# Patient Record
Sex: FEMALE | Race: WHITE | NOT HISPANIC OR LATINO | Employment: OTHER | URBAN - METROPOLITAN AREA
[De-identification: names, ages, dates, MRNs, and addresses within clinical notes are randomized per-mention and may not be internally consistent; named-entity substitution may affect disease eponyms.]

---

## 2017-05-16 ENCOUNTER — RADIANT APPOINTMENT (OUTPATIENT)
Dept: MAMMOGRAPHY | Facility: CLINIC | Age: 67
End: 2017-05-16
Attending: FAMILY MEDICINE
Payer: COMMERCIAL

## 2017-05-16 DIAGNOSIS — Z12.31 VISIT FOR SCREENING MAMMOGRAM: ICD-10-CM

## 2017-05-16 PROCEDURE — G0202 SCR MAMMO BI INCL CAD: HCPCS | Performed by: RADIOLOGY

## 2017-05-16 PROCEDURE — 77063 BREAST TOMOSYNTHESIS BI: CPT | Performed by: RADIOLOGY

## 2017-05-23 NOTE — PROGRESS NOTES
[facility] [place]  [address] [address], [address]  [place] [address]  [phone]  Dept: [phone]    PRE-OP EVALUATION:  Today's date: 2017    Patricia Haynes (: 1950) presents for pre-operative evaluation assessment as requested by Dr. Barlow.  She requires evaluation and anesthesia risk assessment prior to undergoing surgery/procedure for treatment of Cataracts .  Proposed procedure: Cataracts    Date of Surgery/ Procedure: 2017 and 2017  Time of Surgery/ Procedure: Unknown  Hospital/Surgical Facility: [facility]  Fax number for surgical facility:   Primary Physician: Kirsten Molina  Type of Anesthesia Anticipated: General    Patient has a Health Care Directive or Living Will:  YES Just had it done when she redid her Ludwig.     1. NO - Do you have a history of heart attack, stroke, stent, bypass or surgery on an artery in the head, neck, heart or legs?  2. NO - Do you ever have any pain or discomfort in your chest?  3. NO - Do you have a history of  Heart Failure?  4. NO - Are you troubled by shortness of breath when: walking on the level, up a slight hill or at night?  5. NO - Do you currently have a cold, bronchitis or other respiratory infection?  6. NO - Do you have a cough, shortness of breath or wheezing?  7. NO - Do you sometimes get pains in the calves of your legs when you walk?  8. NO - Do you or anyone in your family have previous history of blood clots?  9. NO - Do you or does anyone in your family have a serious bleeding problem such as prolonged bleeding following surgeries or cuts?  10. YES - HAVE YOU EVER HAD PROBLEMS WITH ANEMIA OR BEEN TOLD TO TAKE IRON PILLS? After Delivery 32 years ago  11. NO - Have you had any abnormal blood loss such as black, tarry or bloody stools, or abnormal vaginal bleeding?  12. YES - HAVE YOU EVER HAD A BLOOD TRANSFUSION? Blood Transfusion due to low iron after delivery 32 years ago.   13. NO - Have you or any of  your relatives ever had problems with anesthesia?  14. NO - Do you have sleep apnea, excessive snoring or daytime drowsiness?  15. NO - Do you have any prosthetic heart valves?  16. NO - Do you have prosthetic joints?  17. NO - Is there any chance that you may be pregnant?      HPI:                                                      Brief HPI related to upcoming procedure: patient has been noting issues with seeing signs when driving. Has been noted to have cataracts. She is scheduled for surgery.       See problem list for active medical problems.  Problems all longstanding and stable, except as noted/documented.  See ROS for pertinent symptoms related to these conditions.                                                                                                  .    MEDICAL HISTORY:                                                      Patient Active Problem List    Diagnosis Date Noted     Urinary urgency 09/13/2012     Priority: Medium     Atrophic vaginitis 09/13/2012     Priority: Medium     Urinary frequency 09/13/2012     Priority: Medium     Urge incontinence 09/13/2012     Priority: Medium     Advanced directives, counseling/discussion 08/07/2012     Priority: Medium     Patient states has Advance Directive and will bring in a copy to clinic.   Patient states that she has a health care directive. Recommended that she makes a copy for her medical record.        CKD (chronic kidney disease) stage 2, GFR 60-89 ml/min 08/07/2012     Priority: Medium     Postmenopausal- LMP 2001 06/23/2011     Priority: Medium     Fibroid, uterine      Priority: Medium     not symptomatic       Ganglion cyst      Priority: Medium     left palm       CARDIOVASCULAR SCREENING; LDL GOAL LESS THAN 160 10/31/2010     Priority: Medium      Past Medical History:   Diagnosis Date     Fibroid, uterine early 2000s    not symptomatic     Ganglion cyst     left palm     History of blood transfusion 6/4/85    Excessive bleeding  during childbirth     Postmenopausal 6/23/2011     Past Surgical History:   Procedure Laterality Date     C REPAIR CRUCIATE LIGAMENT,KNEE  1996    Left knee     COLONOSCOPY  03/11/10    moderate internal hemorrhoids- 10 yr f/u     HC CORRECT BUNION,DOUBLE OSTEOTOMY  12/08/1998    Left foot bunion removal      HYSTEROSCOPY W ENDOMETRIAL BX/POLYPECTOMY W/WO D&C  12/21/2004      LAPAROSCOPY, SURGICAL; CHOLECYSTECTOMY  02/14/2005     Current Outpatient Prescriptions   Medication Sig Dispense Refill     solifenacin (VESICARE) 5 MG tablet Take 0.5 tablets (2.5 mg) by mouth daily 45 tablet 2     Omega-3 Fatty Acids (FISH OIL PO) Take  by mouth.       ASPIRIN 81 MG OR TABS ONE DAILY FOR PREVENTION OF STROKE 1 3     OTC products: None, except as noted above    Allergies   Allergen Reactions     No Known Drug Allergies       Latex Allergy: NO    Social History   Substance Use Topics     Smoking status: Never Smoker     Smokeless tobacco: Never Used     Alcohol use Yes      Comment: 4-5 beers a day, never had withdrawals     History   Drug Use No       REVIEW OF SYSTEMS:                                                    C: NEGATIVE for fever, chills, change in weight  I: NEGATIVE for worrisome rashes, moles or lesions  EYES: as above  E/M: NEGATIVE for ear, mouth and throat problems  R: NEGATIVE for significant cough or SOB  B: NEGATIVE for masses, tenderness or discharge  CV: NEGATIVE for chest pain, palpitations or peripheral edema  GI: NEGATIVE for nausea, abdominal pain, heartburn, or change in bowel habits  : NEGATIVE for frequency, dysuria, or hematuria  M: NEGATIVE for significant arthralgias or myalgia  N: NEGATIVE for weakness, dizziness or paresthesias  E: NEGATIVE for temperature intolerance, skin/hair changes  H: NEGATIVE for bleeding problems  P: NEGATIVE for changes in mood or affect    EXAM:                                                    /78  Pulse 68  Temp 98.3  F (36.8  C) (Temporal)  Resp  "16  Ht 5' 2.6\" (1.59 m)  Wt 199 lb (90.3 kg)  LMP 10/31/2004  BMI 35.71 kg/m2    GENERAL APPEARANCE: healthy, alert and no distress     EYES: EOMI, PERRL     HENT: ear canals and TM's normal and nose and mouth without ulcers or lesions     NECK: no adenopathy, no asymmetry, masses, or scars and thyroid normal to palpation     RESP: lungs clear to auscultation - no rales, rhonchi or wheezes     CV: regular rates and rhythm, normal S1 S2, no S3 or S4 and no murmur, click or rub     ABDOMEN:  soft, nontender, no HSM or masses and bowel sounds normal     MS: extremities normal- no gross deformities noted, no evidence of inflammation in joints, FROM in all extremities.     SKIN: no suspicious lesions or rashes     NEURO: Normal strength and tone, sensory exam grossly normal, mentation intact and speech normal     PSYCH: mentation appears normal. and affect normal/bright    DIAGNOSTICS:                                                    No labs or EKG required for low risk surgery (cataract, skin procedure, breast biopsy, etc)    Recent Labs   Lab Test  10/05/16   1104  01/05/16   1101  09/01/15   1014   HGB   --    --   14.2   PLT   --    --   265   NA   --   141  141   POTASSIUM   --   4.1  4.4   CR   --   0.73  0.74   A1C  5.3   --    --         IMPRESSION:                                                    Reason for surgery/procedure: cataracts  Diagnosis/reason for consult: CKD    The proposed surgical procedure is considered LOW risk.    REVISED CARDIAC RISK INDEX  The patient has the following serious cardiovascular risks for perioperative complications such as (MI, PE, VFib and 3  AV Block):  No serious cardiac risks  INTERPRETATION: 0 risks: Class I (very low risk - 0.4% complication rate)    The patient has the following additional risks for perioperative complications:  No identified additional risks      ICD-10-CM    1. Preop general physical exam Z01.818    2. Cataract H26.9    3. CKD (chronic kidney " disease) stage 2, GFR 60-89 ml/min N18.2        RECOMMENDATIONS:                                                          --Patient is to take all scheduled medications on the day of surgery EXCEPT for modifications listed below.    Anticoagulant or Antiplatelet Medication Use  ASPIRIN: she will stop the aspirin 2 days prior to procedure.         APPROVAL GIVEN to proceed with proposed procedure, without further diagnostic evaluation       Signed Electronically by: AARON RUIZ MD, MD    Copy of this evaluation report is provided to requesting physician.    [facility] Preop Guidelines

## 2017-05-30 ENCOUNTER — OFFICE VISIT (OUTPATIENT)
Dept: FAMILY MEDICINE | Facility: CLINIC | Age: 67
End: 2017-05-30
Payer: COMMERCIAL

## 2017-05-30 VITALS
TEMPERATURE: 98.3 F | HEIGHT: 63 IN | SYSTOLIC BLOOD PRESSURE: 110 MMHG | WEIGHT: 199 LBS | BODY MASS INDEX: 35.26 KG/M2 | RESPIRATION RATE: 16 BRPM | DIASTOLIC BLOOD PRESSURE: 78 MMHG | HEART RATE: 68 BPM

## 2017-05-30 DIAGNOSIS — H26.9 CATARACT: ICD-10-CM

## 2017-05-30 DIAGNOSIS — N18.2 CKD (CHRONIC KIDNEY DISEASE) STAGE 2, GFR 60-89 ML/MIN: ICD-10-CM

## 2017-05-30 DIAGNOSIS — Z01.818 PREOP GENERAL PHYSICAL EXAM: Primary | ICD-10-CM

## 2017-05-30 PROCEDURE — 99214 OFFICE O/P EST MOD 30 MIN: CPT | Performed by: FAMILY MEDICINE

## 2017-05-30 ASSESSMENT — PAIN SCALES - GENERAL: PAINLEVEL: NO PAIN (0)

## 2017-05-30 NOTE — NURSING NOTE
"Chief Complaint   Patient presents with     Pre-Op Exam     Panel Management     BMP, Microalbumin, Lipid Panel       Initial /78  Pulse 68  Temp 98.3  F (36.8  C) (Temporal)  Resp 16  Ht 5' 2.6\" (1.59 m)  Wt 199 lb (90.3 kg)  LMP 10/31/2004  BMI 35.71 kg/m2 Estimated body mass index is 35.71 kg/(m^2) as calculated from the following:    Height as of this encounter: 5' 2.6\" (1.59 m).    Weight as of this encounter: 199 lb (90.3 kg).  Medication Reconciliation: complete     Julia Manning CMA  "

## 2017-05-30 NOTE — MR AVS SNAPSHOT
After Visit Summary   5/30/2017    Patricia Haynes    MRN: [id number]           Patient Information     Date Of Birth          1950        Visit Information        Provider Department      5/30/2017 10:00 AM Kirsten Molina MD [facility] [place]        Today's Diagnoses     Preop general physical exam    -  1      Care Instructions      Before Your Surgery      Call your surgeon if there is any change in your health. This includes signs of a cold or flu (such as a sore throat, runny nose, cough, rash or fever).    Do not smoke, drink alcohol or take over the counter medicine (unless your surgeon or primary care doctor tells you to) for the 24 hours before and after surgery.    If you take prescribed drugs: Follow your doctor s orders about which medicines to take and which to stop until after surgery.    Eating and drinking prior to surgery: follow the instructions from your surgeon    Take a shower or bath the night before surgery. Use the soap your surgeon gave you to gently clean your skin. If you do not have soap from your surgeon, use your regular soap. Do not shave or scrub the surgery site.  Wear clean pajamas and have clean sheets on your bed.           Follow-ups after your visit        Who to contact     If you have questions or need follow up information about today's clinic visit or your schedule please contact [facility] [place] directly at [phone].  Normal or non-critical lab and imaging results will be communicated to you by MyChart, letter or phone within 4 business days after the clinic has received the results. If you do not hear from us within 7 days, please contact the clinic through MyChart or phone. If you have a critical or abnormal lab result, we will notify you by phone as soon as possible.  Submit refill requests through [email or web address] or call your pharmacy and they will forward the refill request to us. Please allow 3 business days for your refill to be  "completed.          Additional Information About Your Visit        [employer] Information     [employer] gives you secure access to your electronic health record. If you see a primary care provider, you can also send messages to your care team and make appointments. If you have questions, please call your primary care clinic.  If you do not have a primary care provider, please call [phone] and they will assist you.        Care EveryWhere ID     This is your Care EveryWhere ID. This could be used by other organizations to access your [place] medical records  [id number]        Your Vitals Were     Pulse Temperature Respirations Height Last Period BMI (Body Mass Index)    68 98.3  F (36.8  C) (Temporal) 16 5' 2.6\" (1.59 m) 10/31/2004 35.71 kg/m2       Blood Pressure from Last 3 Encounters:   05/30/17 110/78   10/05/16 122/82   01/05/16 126/80    Weight from Last 3 Encounters:   05/30/17 199 lb (90.3 kg)   10/05/16 195 lb 12.8 oz (88.8 kg)   01/05/16 195 lb 12.8 oz (88.8 kg)              Today, you had the following     No orders found for display         Today's Medication Changes          These changes are accurate as of: 5/30/17 10:18 AM.  If you have any questions, ask your nurse or doctor.               Stop taking these medicines if you haven't already. Please contact your care team if you have questions.     estradiol 0.1 MG/GM cream   Commonly known as:  ESTRACE VAGINAL   Stopped by:  Kirsten Molina MD                    Primary Care Provider Office Phone # Fax #    Kirsten Molina [phone]       [facility] [address] [address] [facility] [address]        Thank you!     Thank you for choosing [facility]  for your care. Our goal is always to provide you with excellent care. Hearing back from our patients is one way we can continue to improve our services. Please take a few minutes to complete the written survey that you may receive in the mail after your visit with us. " Thank you!             Your Updated Medication List - Protect others around you: Learn how to safely use, store and throw away your medicines at www.disposemymeds.org.          This list is accurate as of: 5/30/17 10:18 AM.  Always use your most recent med list.                   Brand Name Dispense Instructions for use    aspirin 81 MG tablet     1    ONE DAILY FOR PREVENTION OF STROKE       FISH OIL PO      Take  by mouth.       solifenacin 5 MG tablet    VESICARE    45 tablet    Take 0.5 tablets (2.5 mg) by mouth daily

## 2017-07-21 ENCOUNTER — MYC REFILL (OUTPATIENT)
Dept: FAMILY MEDICINE | Facility: CLINIC | Age: 67
End: 2017-07-21

## 2017-07-21 DIAGNOSIS — R39.15 URINARY URGENCY: Primary | ICD-10-CM

## 2017-07-21 DIAGNOSIS — N39.41 URGE INCONTINENCE: ICD-10-CM

## 2017-07-21 DIAGNOSIS — R35.0 URINARY FREQUENCY: ICD-10-CM

## 2017-07-21 RX ORDER — SOLIFENACIN SUCCINATE 5 MG/1
2.5 TABLET, FILM COATED ORAL DAILY
Qty: 45 TABLET | Refills: 2 | Status: SHIPPED | OUTPATIENT
Start: 2017-07-21 | End: 2018-01-19 | Stop reason: ALTCHOICE

## 2017-07-21 NOTE — TELEPHONE ENCOUNTER
Message from Pianpianhart:  Original authorizing provider: MD Patricia Tucker would like a refill of the following medications:  solifenacin (VESICARE) 5 MG tablet [Cosme Beaulieu MD]    Preferred pharmacy: [id number] [address]    Comment:  I talked to Dr. Molina about going back to a whole pill a day, instead of 1/2.

## 2018-01-17 ENCOUNTER — TELEPHONE (OUTPATIENT)
Dept: FAMILY MEDICINE | Facility: CLINIC | Age: 68
End: 2018-01-17

## 2018-01-17 NOTE — TELEPHONE ENCOUNTER
"Patient is on Dr. Molina schedule this Friday at 3:20pm  for \"rapid irregular heart beat\". RN please review if needed to be triaged.   "

## 2018-01-17 NOTE — PROGRESS NOTES
SUBJECTIVE:                                                    Patricia Haynes is a 67 year old female who presents to clinic today for the following health issues:    HPI    Irregular heart beats   Onset:ongoing since August 2015.     Description: Patient states she has been having ongoing irregular heart beats. She did have a stress test done in 2015, Patient states no abnormal findings.        Intensity: mild    Progression of Symptoms:  intermittent    Accompanying Signs & Symptoms:      Previous history of similar problem:     None     Precipitating factors:   Worsened by: none  Therapies Tried and outcome: No therapies tried.     At random all of a sudden she feels her heart start to race, lasts 2-4 hours. When it starts she take 2-3 aspirins. Almost always in the evening. She can't identify any food, exercise or alcohol intake that causes it. It wakes her up occassionally. First time it occurred was in 2015, she saw Dr. Sung and had an echo stress test done. The last one occurred last week, and before that it was September 30th. She has no associated pain or light-headedness. She thinks they are starting to occur with increasing frequency. She only initially noted that the rhythm was abnormal when she started monitoring it with her phone's built in heart monitor.   She denies any known heart rhythm problems. She has no heart problems, her mother had high blood pressure and strokes.     Urinary Incontinence  She also needs to refill her Vesicare, the price recently changed from 30$ to 130$. It was working well for her, but the price is too high now. She reports that she had mild side effects of dry eye with this, but it wasn't bad.     Chronic Kidney Disease Follow-up      Current NSAID use?  Yes:   Aspirin 81 MG         Problem list and histories reviewed & adjusted, as indicated.  Additional history: as documented    BP Readings from Last 3 Encounters:   01/19/18 122/80   05/30/17 110/78   10/05/16  "122/82    Wt Readings from Last 3 Encounters:   01/19/18 90.4 kg (199 lb 6.4 oz)   05/30/17 90.3 kg (199 lb)   10/05/16 88.8 kg (195 lb 12.8 oz)           ROS:  C: NEGATIVE for fever, chills, change in weight  EYES: POSITIVE for dry eye  R: NEGATIVE for significant cough or SOB  CV: POSITIVE for irregular heart beat and palpitations  : incontinence  ROS otherwise negative  Denies weight changes, hot or cold flashes    This document serves as a record of the services and decisions personally performed and made by Kirsten Molina MD. It was created on her behalf by Beverly Wolf, a trained medical scribe. The creation of this document is based the provider's statements to the medical scribe.  Beverly Wolf, January 19, 2018 3:31 PM      OBJECTIVE:     /80  Pulse 66  Temp 98.2  F (36.8  C) (Temporal)  Resp 14  Ht 1.588 m (5' 2.5\")  Wt 90.4 kg (199 lb 6.4 oz)  LMP 10/31/2004  SpO2 96%  BMI 35.89 kg/m2  Body mass index is 35.89 kg/(m^2).  GENERAL: healthy, alert and no distress, obese  HENT: ear canals and TM's normal, nose and mouth without ulcers or lesions  NECK: no adenopathy, no asymmetry, masses, or scars and thyroid normal to palpation  RESP: lungs clear to auscultation - no rales, rhonchi or wheezes  CV: regular rates and rhythm, normal S1 S2, no S3 or S4, no murmur, click or rub and no peripheral edema  SKIN: no suspicious lesions or rashes  PSYCH: mentation appears normal, affect normal/bright    Diagnostic Test Results:  Results for orders placed or performed in visit on 01/19/18 (from the past 24 hour(s))   CBC with platelets and differential   Result Value Ref Range    WBC 6.5 4.0 - 11.0 10e9/L    RBC Count 4.63 3.8 - 5.2 10e12/L    Hemoglobin 14.4 11.7 - 15.7 g/dL    Hematocrit 41.8 35.0 - 47.0 %    MCV 90 78 - 100 fl    MCH 31.1 26.5 - 33.0 pg    MCHC 34.4 31.5 - 36.5 g/dL    RDW 13.2 10.0 - 15.0 %    Platelet Count 300 150 - 450 10e9/L    Diff Method PENDING      EKG - no significant change from "     ASSESSMENT/PLAN:         1. Palpitations  No abnormal rhythms or murmurs heard on auscultation. Repeat EKG showed no changes. Discussed ordering labs to check electrolyte and TSH levels and check for anemia.   Consider ziopatch for further evaluation. Her symptoms are irregular so may difficult to catch.   Patient agrees to plan.   - CBC with platelets and differential  - TSH with free T4 reflex  - Comprehensive metabolic panel  - EKG 12-lead complete w/read - Clinics    2. Urge incontinence  Discussed switching to a new medication for her incontinence that would be cheaper for her, but might increase her dry eye side effects. Recommended switching to ditropan which will be much lower cost for her. Advised her to follow up if it isn't working for her or the side effects are worsened significantly.   - oxybutynin (DITROPAN XL) 5 MG 24 hr tablet; Take 1 tablet (5 mg) by mouth daily  Dispense: 30 tablet; Refill: 1      MEDICATIONS:        - Discontinue Vesicare       - Start taking Oxybutynin       - Continue other medications without change    See Patient Instructions    The information in this document, created by the medical scribe for me, accurately reflects the services I personally performed and the decisions made by me. I have reviewed and approved this document for accuracy.     AARON RUIZ MD, MD  [facility]

## 2018-01-18 ENCOUNTER — MYC MEDICAL ADVICE (OUTPATIENT)
Dept: FAMILY MEDICINE | Facility: CLINIC | Age: 68
End: 2018-01-18

## 2018-01-18 NOTE — TELEPHONE ENCOUNTER
"Patricia Haynes is a 67 year old female who calls with irregular heart rate.    NURSING ASSESSMENT:  Description:  I spoke with pt who states she has been having these \"episodes\" for 3 years but they are happening more often and lasting longer. Used to last 45 min but now can last up to 4 hours Pt states her heart pounds. Usually happens in the late evening or while she is sleeping. Usually heart rate is 62-65 but 90s during this time. Last episode was Monday.  Onset/duration:  3 years  Precip. factors:  None, saw DA in the past for this and states she passed her stress test with \"flying colors\"  Associated symptoms:  No pain, no HA, irredgular  Improves/worsens symptoms:  States she takes 3 aspirin and relaxes  Pain scale (0-10)   0/10  Last exam/Treatment:  Pre-op for cateracts 5/30/2017  Allergies:   Allergies   Allergen Reactions     No Known Drug Allergies        RECOMMENDED DISPOSITION:  See in 24 hours  Will comply with recommendation: Yes  If further questions/concerns or if symptoms do not improve, worsen or new symptoms develop, call your PCP or [facility] as soon as possible.      Guideline used: heart rate problems  Telephone Triage Protocols for Nurses, Fifth Edition, Alayna Durán RN    "

## 2018-01-19 ENCOUNTER — OFFICE VISIT (OUTPATIENT)
Dept: FAMILY MEDICINE | Facility: CLINIC | Age: 68
End: 2018-01-19
Payer: COMMERCIAL

## 2018-01-19 VITALS
SYSTOLIC BLOOD PRESSURE: 122 MMHG | RESPIRATION RATE: 14 BRPM | WEIGHT: 199.4 LBS | BODY MASS INDEX: 35.33 KG/M2 | OXYGEN SATURATION: 96 % | TEMPERATURE: 98.2 F | DIASTOLIC BLOOD PRESSURE: 80 MMHG | HEIGHT: 63 IN | HEART RATE: 66 BPM

## 2018-01-19 DIAGNOSIS — R00.2 PALPITATIONS: Primary | ICD-10-CM

## 2018-01-19 DIAGNOSIS — N39.41 URGE INCONTINENCE: ICD-10-CM

## 2018-01-19 LAB
BASOPHILS # BLD AUTO: 0 10E9/L (ref 0–0.2)
BASOPHILS NFR BLD AUTO: 0.5 %
DIFFERENTIAL METHOD BLD: NORMAL
EOSINOPHIL # BLD AUTO: 0.1 10E9/L (ref 0–0.7)
EOSINOPHIL NFR BLD AUTO: 2.2 %
ERYTHROCYTE [DISTWIDTH] IN BLOOD BY AUTOMATED COUNT: 13.2 % (ref 10–15)
HCT VFR BLD AUTO: 41.8 % (ref 35–47)
HGB BLD-MCNC: 14.4 G/DL (ref 11.7–15.7)
LYMPHOCYTES # BLD AUTO: 2 10E9/L (ref 0.8–5.3)
LYMPHOCYTES NFR BLD AUTO: 30.5 %
MCH RBC QN AUTO: 31.1 PG (ref 26.5–33)
MCHC RBC AUTO-ENTMCNC: 34.4 G/DL (ref 31.5–36.5)
MCV RBC AUTO: 90 FL (ref 78–100)
MONOCYTES # BLD AUTO: 0.6 10E9/L (ref 0–1.3)
MONOCYTES NFR BLD AUTO: 8.8 %
NEUTROPHILS # BLD AUTO: 3.8 10E9/L (ref 1.6–8.3)
NEUTROPHILS NFR BLD AUTO: 58 %
PLATELET # BLD AUTO: 300 10E9/L (ref 150–450)
RBC # BLD AUTO: 4.63 10E12/L (ref 3.8–5.2)
WBC # BLD AUTO: 6.5 10E9/L (ref 4–11)

## 2018-01-19 PROCEDURE — 84439 ASSAY OF FREE THYROXINE: CPT | Performed by: FAMILY MEDICINE

## 2018-01-19 PROCEDURE — 80050 GENERAL HEALTH PANEL: CPT | Performed by: FAMILY MEDICINE

## 2018-01-19 PROCEDURE — 99214 OFFICE O/P EST MOD 30 MIN: CPT | Performed by: FAMILY MEDICINE

## 2018-01-19 PROCEDURE — 93000 ELECTROCARDIOGRAM COMPLETE: CPT | Performed by: FAMILY MEDICINE

## 2018-01-19 PROCEDURE — 36415 COLL VENOUS BLD VENIPUNCTURE: CPT | Performed by: FAMILY MEDICINE

## 2018-01-19 RX ORDER — OXYBUTYNIN CHLORIDE 5 MG/1
5 TABLET, EXTENDED RELEASE ORAL DAILY
Qty: 30 TABLET | Refills: 1 | Status: SHIPPED | OUTPATIENT
Start: 2018-01-19 | End: 2018-03-20

## 2018-01-19 ASSESSMENT — PAIN SCALES - GENERAL: PAINLEVEL: NO PAIN (0)

## 2018-01-19 NOTE — MR AVS SNAPSHOT
"              After Visit Summary   1/19/2018    Patricia Haynes    MRN: [id number]           Patient Information     Date Of Birth          1950        Visit Information        Provider Department      1/19/2018 3:20 PM Kirsten Molina MD [facility] [place]        Today's Diagnoses     Palpitations    -  1    Urge incontinence           Follow-ups after your visit        Who to contact     If you have questions or need follow up information about today's clinic visit or your schedule please contact [facility] [place] directly at [phone].  Normal or non-critical lab and imaging results will be communicated to you by [employer], letter or phone within 4 business days after the clinic has received the results. If you do not hear from us within 7 days, please contact the clinic through [employer] or phone. If you have a critical or abnormal lab result, we will notify you by phone as soon as possible.  Submit refill requests through [facility] or call your pharmacy and they will forward the refill request to us. Please allow 3 business days for your refill to be completed.          Additional Information About Your Visit        MyChart Information     [facility] gives you secure access to your electronic health record. If you see a primary care provider, you can also send messages to your care team and make appointments. If you have questions, please call your primary care clinic.  If you do not have a primary care provider, please call [phone] and they will assist you.        Care EveryWhere ID     This is your Care EveryWhere ID. This could be used by other organizations to access your [facility] medical records  [id number]        Your Vitals Were     Pulse Temperature Respirations Height Last Period Pulse Oximetry    66 98.2  F (36.8  C) (Temporal) 14 5' 2.5\" (1.588 m) 10/31/2004 96%    BMI (Body Mass Index)                   35.89 kg/m2            Blood Pressure from Last 3 Encounters:   01/19/18 " 122/80   05/30/17 110/78   10/05/16 122/82    Weight from Last 3 Encounters:   01/19/18 199 lb 6.4 oz (90.4 kg)   05/30/17 199 lb (90.3 kg)   10/05/16 195 lb 12.8 oz (88.8 kg)              We Performed the Following     CBC with platelets and differential     Comprehensive metabolic panel     EKG 12-lead complete w/read - Clinics     TSH with free T4 reflex          Today's Medication Changes          These changes are accurate as of: 1/19/18 11:59 PM.  If you have any questions, ask your nurse or doctor.               Start taking these medicines.        Dose/Directions    oxybutynin 5 MG 24 hr tablet   Commonly known as:  DITROPAN XL   Used for:  Urge incontinence   Started by:  Kirsten Molina MD        Dose:  5 mg   Take 1 tablet (5 mg) by mouth daily   Quantity:  30 tablet   Refills:  1         Stop taking these medicines if you haven't already. Please contact your care team if you have questions.     solifenacin 5 MG tablet   Commonly known as:  VESICARE   Stopped by:  Kirsten Molina MD                Where to get your medicines      These medications were sent to [id number] - [place], [place] - [address]  [address] [address]    Hours:  test Rx sent successfully 12/26/02  KR Phone:  [phone]     oxybutynin 5 MG 24 hr tablet                Primary Care Provider Office Phone # Fax #    Kirsten Molina [phone] [address] [address]        Equal Access to Services     [facility]: Hadii gabriella ku hadasho Soomaali, waaxda luqadaha, qaybta kaalmada adeegyada, jose idiin hayaan adeeg kharash la'aan . So [facility] [phone].    ATENCIÓN: Si habla español, tiene a alonso disposición servicios gratuitos de asistencia lingüística. Llame al [phone].    We comply with applicable federal civil rights laws and [place] laws. We do not discriminate on the basis of race, color, national origin, age, disability, sex, sexual orientation,  or gender identity.            Thank you!     Thank you for choosing [facility]  for your care. Our goal is always to provide you with excellent care. Hearing back from our patients is one way we can continue to improve our services. Please take a few minutes to complete the written survey that you may receive in the mail after your visit with us. Thank you!             Your Updated Medication List - Protect others around you: Learn how to safely use, store and throw away your medicines at www.disposemymeds.org.          This list is accurate as of: 1/19/18 11:59 PM.  Always use your most recent med list.                   Brand Name Dispense Instructions for use Diagnosis    aspirin 81 MG tablet     1    ONE DAILY FOR PREVENTION OF STROKE    Routine gynecological examination       FISH OIL PO      Take  by mouth.        oxybutynin 5 MG 24 hr tablet    DITROPAN XL    30 tablet    Take 1 tablet (5 mg) by mouth daily    Urge incontinence

## 2018-01-22 LAB
ALBUMIN SERPL-MCNC: 3.9 G/DL (ref 3.4–5)
ALP SERPL-CCNC: 54 U/L (ref 40–150)
ALT SERPL W P-5'-P-CCNC: 37 U/L (ref 0–50)
ANION GAP SERPL CALCULATED.3IONS-SCNC: 7 MMOL/L (ref 3–14)
AST SERPL W P-5'-P-CCNC: 16 U/L (ref 0–45)
BILIRUB SERPL-MCNC: 0.3 MG/DL (ref 0.2–1.3)
BUN SERPL-MCNC: 18 MG/DL (ref 7–30)
CALCIUM SERPL-MCNC: 8.9 MG/DL (ref 8.5–10.1)
CHLORIDE SERPL-SCNC: 103 MMOL/L (ref 94–109)
CO2 SERPL-SCNC: 30 MMOL/L (ref 20–32)
CREAT SERPL-MCNC: 0.84 MG/DL (ref 0.52–1.04)
GFR SERPL CREATININE-BSD FRML MDRD: 68 ML/MIN/1.7M2
GLUCOSE SERPL-MCNC: 123 MG/DL (ref 70–99)
POTASSIUM SERPL-SCNC: 3.7 MMOL/L (ref 3.4–5.3)
PROT SERPL-MCNC: 7.1 G/DL (ref 6.8–8.8)
SODIUM SERPL-SCNC: 140 MMOL/L (ref 133–144)
T4 FREE SERPL-MCNC: 0.93 NG/DL (ref 0.76–1.46)
TSH SERPL DL<=0.005 MIU/L-ACNC: 4.98 MU/L (ref 0.4–4)

## 2018-01-23 ENCOUNTER — MYC MEDICAL ADVICE (OUTPATIENT)
Dept: FAMILY MEDICINE | Facility: CLINIC | Age: 68
End: 2018-01-23

## 2018-01-23 ENCOUNTER — TELEPHONE (OUTPATIENT)
Dept: FAMILY MEDICINE | Facility: CLINIC | Age: 68
End: 2018-01-23

## 2018-01-23 DIAGNOSIS — R00.2 PALPITATIONS: Primary | ICD-10-CM

## 2018-01-23 DIAGNOSIS — E03.4 HYPOTHYROIDISM DUE TO ACQUIRED ATROPHY OF THYROID: ICD-10-CM

## 2018-01-23 RX ORDER — LEVOTHYROXINE SODIUM 25 UG/1
25 TABLET ORAL DAILY
Qty: 90 TABLET | Refills: 1 | Status: SHIPPED | OUTPATIENT
Start: 2018-01-23 | End: 2018-07-23

## 2018-01-23 NOTE — TELEPHONE ENCOUNTER
Orders placed for echo. Patient to schedule. See [employer] message sent.     Prescription sent for levothyroxine. Future lab orders also placed.     Kirsten Molina MD

## 2018-01-24 ENCOUNTER — TELEPHONE (OUTPATIENT)
Dept: FAMILY MEDICINE | Facility: CLINIC | Age: 68
End: 2018-01-24

## 2018-01-24 ENCOUNTER — RADIANT APPOINTMENT (OUTPATIENT)
Dept: CARDIOLOGY | Facility: CLINIC | Age: 68
End: 2018-01-24
Attending: FAMILY MEDICINE
Payer: COMMERCIAL

## 2018-01-24 DIAGNOSIS — R00.2 PALPITATIONS: Primary | ICD-10-CM

## 2018-01-24 DIAGNOSIS — I51.7 MILD CONCENTRIC LEFT VENTRICULAR HYPERTROPHY (LVH): ICD-10-CM

## 2018-01-24 DIAGNOSIS — R00.2 PALPITATIONS: ICD-10-CM

## 2018-01-24 PROCEDURE — 93306 TTE W/DOPPLER COMPLETE: CPT

## 2018-01-30 ENCOUNTER — ALLIED HEALTH/NURSE VISIT (OUTPATIENT)
Dept: CARDIOLOGY | Facility: CLINIC | Age: 68
End: 2018-01-30
Attending: FAMILY MEDICINE
Payer: COMMERCIAL

## 2018-01-30 DIAGNOSIS — R00.2 PALPITATIONS: ICD-10-CM

## 2018-01-30 PROCEDURE — 0298T ZZC EXT ECG > 48HR TO 21 DAY REVIEW AND INTERPRETATN: CPT

## 2018-01-30 PROCEDURE — 0296T ZIO PATCH HOLTER: CPT

## 2018-02-20 ENCOUNTER — TRANSFERRED RECORDS (OUTPATIENT)
Dept: HEALTH INFORMATION MANAGEMENT | Facility: CLINIC | Age: 68
End: 2018-02-20

## 2018-02-23 ENCOUNTER — TELEPHONE (OUTPATIENT)
Dept: FAMILY MEDICINE | Facility: CLINIC | Age: 68
End: 2018-02-23

## 2018-02-23 NOTE — TELEPHONE ENCOUNTER
Called and left patient message regarding results. The results look good.  There are sometimes that the heart rate is elevated but not any concerning rhythms.     Recommend she come in and discuss results if she has questions.  May be able to utilize medication if she is having bothersome symptoms.

## 2018-03-20 ENCOUNTER — MYC REFILL (OUTPATIENT)
Dept: FAMILY MEDICINE | Facility: CLINIC | Age: 68
End: 2018-03-20

## 2018-03-20 DIAGNOSIS — N39.41 URGE INCONTINENCE: ICD-10-CM

## 2018-03-20 RX ORDER — OXYBUTYNIN CHLORIDE 5 MG/1
5 TABLET, EXTENDED RELEASE ORAL DAILY
Qty: 90 TABLET | Refills: 2 | Status: SHIPPED | OUTPATIENT
Start: 2018-03-20 | End: 2018-07-23 | Stop reason: ALTCHOICE

## 2018-03-20 NOTE — TELEPHONE ENCOUNTER
Ditropan:  Prescription approved per [facility] [place] [place] Refill Protocol.  Nancy German, RN, BSN

## 2018-03-20 NOTE — TELEPHONE ENCOUNTER
Message from [employer]:  Original authorizing provider: AARON RUIZ MD, MD Patricia Haynes would like a refill of the following medications:  oxybutynin (DITROPAN XL) 5 MG 24 hr tablet [AARON RUIZ MD, MD]    Preferred pharmacy: [id number] [place], [place] - [address]    Comment:  Please renew for 90 days, if possible. It says no refills.

## 2018-03-30 ENCOUNTER — TELEPHONE (OUTPATIENT)
Dept: FAMILY MEDICINE | Facility: CLINIC | Age: 68
End: 2018-03-30

## 2018-03-30 NOTE — LETTER
[facility]  [address], [address]  [place] [address]  Phone: [phone]  Fax: [phone]  March 30, 2018      Patricia Haynes  [address] [place] [address]      Dear Patricia,    We care about your health and have reviewed your health plan including your medical conditions, medications, and lab results.  Based on this review, it is recommended that you follow up regarding the following health topic(s):  -Wellness (Physical) Visit     We recommend you take the following action(s):  -schedule a WELLNESS (Physical) APPOINTMENT.  We will perform the following labs: A1c, Lipids (fasting cholesterol - nothing to eat except water and/or meds for 8-10 hours), CMP(complete metabolic panel), ALT/AST and TSH (thyroid test).     Please call us at the [facility] - [phone] (or use [employer]) to address the above recommendations.     Thank you for trusting [facility] and we appreciate the opportunity to serve you.  We look forward to supporting your healthcare needs in the future.    Healthy Regards,    Your Health Care Team  [facility]

## 2018-03-30 NOTE — TELEPHONE ENCOUNTER
Panel Management Review      Patient has the following on her problem list: None      Composite cancer screening  Chart review shows that this patient is due/due soon for the following None  Summary:    Patient is due/failing the following:   LDL and PHYSICAL    Action needed:   Patient needs office visit for Physical.    Type of outreach:    Sent letter.    Questions for provider review:    None                                                                                                                                    Angelina Oviedo CMA       Chart routed to Care Team .

## 2018-05-14 NOTE — TELEPHONE ENCOUNTER
Panel Management Review      Patient has the following on her problem list: None      Composite cancer screening  Chart review shows that this patient is due/due soon for the following None  Summary:    Patient is due/failing the following:   LDL and PHYSICAL    Action needed:   Patient needs office visit for physical.    Type of outreach:    Phone, left message for patient to call back.     Questions for provider review:    None                                                                                                                                    Angelina Oviedo CMA       Chart routed to Care Team .

## 2018-06-27 ENCOUNTER — TELEPHONE (OUTPATIENT)
Dept: FAMILY MEDICINE | Facility: CLINIC | Age: 68
End: 2018-06-27

## 2018-06-27 NOTE — TELEPHONE ENCOUNTER
Panel Management Review      Patient has the following on her problem list: None      Composite cancer screening  Chart review shows that this patient is due/due soon for the following None  Summary:    Patient is due/failing the following:   Albumin, lipid, TSH, T 4     Action needed:   Patient needs fasting lab only appointment    Type of outreach:    Phone, left message for patient to call back.  and Sent [employer] message.    Questions for provider review:    None                                                                                                                                    Jeramie Lux MA       Chart routed to Care Team .

## 2018-06-28 DIAGNOSIS — E03.4 HYPOTHYROIDISM DUE TO ACQUIRED ATROPHY OF THYROID: ICD-10-CM

## 2018-06-28 LAB
T4 FREE SERPL-MCNC: 1.03 NG/DL (ref 0.76–1.46)
TSH SERPL DL<=0.005 MIU/L-ACNC: 3.18 MU/L (ref 0.4–4)

## 2018-06-28 PROCEDURE — 84439 ASSAY OF FREE THYROXINE: CPT | Performed by: FAMILY MEDICINE

## 2018-06-28 PROCEDURE — 36415 COLL VENOUS BLD VENIPUNCTURE: CPT | Performed by: FAMILY MEDICINE

## 2018-06-28 PROCEDURE — 84443 ASSAY THYROID STIM HORMONE: CPT | Performed by: FAMILY MEDICINE

## 2018-07-22 ENCOUNTER — MYC MEDICAL ADVICE (OUTPATIENT)
Dept: FAMILY MEDICINE | Facility: CLINIC | Age: 68
End: 2018-07-22

## 2018-07-22 DIAGNOSIS — R39.15 URINARY URGENCY: Primary | ICD-10-CM

## 2018-07-22 DIAGNOSIS — N39.41 URGE INCONTINENCE: ICD-10-CM

## 2018-07-22 DIAGNOSIS — R35.0 URINARY FREQUENCY: ICD-10-CM

## 2018-07-23 DIAGNOSIS — E03.4 HYPOTHYROIDISM DUE TO ACQUIRED ATROPHY OF THYROID: ICD-10-CM

## 2018-07-23 RX ORDER — LEVOTHYROXINE SODIUM 25 UG/1
TABLET ORAL
Qty: 90 TABLET | Refills: 1 | Status: SHIPPED | OUTPATIENT
Start: 2018-07-23 | End: 2018-09-26

## 2018-07-23 RX ORDER — OXYBUTYNIN CHLORIDE 5 MG/1
5 TABLET ORAL DAILY
Qty: 90 TABLET | Refills: 0 | Status: SHIPPED | OUTPATIENT
Start: 2018-07-23 | End: 2018-09-26

## 2018-07-23 NOTE — TELEPHONE ENCOUNTER
Pt stopping in to see if someone else can refill her prescription as Dr. Molina is out. She would like it sent to  [facility] in [place]. She has 4 pills left. Please advise.

## 2018-07-24 ENCOUNTER — MYC REFILL (OUTPATIENT)
Dept: FAMILY MEDICINE | Facility: CLINIC | Age: 68
End: 2018-07-24

## 2018-07-24 DIAGNOSIS — E03.4 HYPOTHYROIDISM DUE TO ACQUIRED ATROPHY OF THYROID: ICD-10-CM

## 2018-07-24 RX ORDER — LEVOTHYROXINE SODIUM 25 UG/1
25 TABLET ORAL DAILY
Qty: 90 TABLET | Refills: 1 | Status: CANCELLED | OUTPATIENT
Start: 2018-07-24

## 2018-07-24 NOTE — TELEPHONE ENCOUNTER
Message from MyChart:  Original authorizing provider: MD Patricia Tucker would like a refill of the following medications:  levothyroxine (SYNTHROID/LEVOTHROID) 25 MCG tablet [Cosme Beaulieu MD]    Preferred pharmacy: [id number] [address]    Comment:  I have 3 left, no refills & can't see Dr. Molina until August.

## 2018-09-20 NOTE — PROGRESS NOTES
SUBJECTIVE:   Patricia Haynes is a 68 year old female who presents to clinic today for the following health issues:  Client would like a refill of her Levothyroxine and Oxybutynin.     HPI  Concern - Bug Bite- 08/27/2018  Onset: 8/27/2018    Description:   Left upper part of gluteal. Client states that it is a big swollen square. When the bite first appeared it got hot and red, but nothing never appeared right on the site. Painful to the the touch.     Intensity: moderate 4-5/10 at worse. Only painful with touch.     Progression of Symptoms:  Redness/hot has improved. Pain is still present and constant pain to the touch.     Previous history of similar problem:   NA    Precipitating factors:   Worsened by: touch    Alleviating factors:  Improved by: Ice, Extra strength cortaid    Therapies Tried and outcome: Listed above- the ice worked the best.     The patient complains of having a bug bite that occurred on August 27 th. She states that she was on a bicycle ride and got bit suddenly. She complains that the site of the bite was really hot and red when she first got bitten. To help with the healing of the bite, the patient states that she has put ice and ointment on it.   Today, the patient states that the bite is painful to touch and the redness has gone away. The patient denies having a fever, aches, pains, nausea or vomiting.     Oxybutynin  The patient states that her medication is working well and she has no complaints  Needs refills.     Problem list and histories reviewed & adjusted, as indicated.  Additional history: as documented  Patient Active Problem List   Diagnosis     CARDIOVASCULAR SCREENING; LDL GOAL LESS THAN 160     Postmenopausal- LMP 2001     Fibroid, uterine     Ganglion cyst     Advanced directives, counseling/discussion     CKD (chronic kidney disease) stage 2, GFR 60-89 ml/min     Urinary urgency     Atrophic vaginitis     Urinary frequency     Urge incontinence     Hypothyroidism due to  acquired atrophy of thyroid     Mild concentric left ventricular hypertrophy (LVH)     Past Surgical History:   Procedure Laterality Date     C REPAIR CRUCIATE LIGAMENT,KNEE  1996    Left knee     COLONOSCOPY  03/11/10    moderate internal hemorrhoids- 10 yr f/u      CORRECT BUNION,DOUBLE OSTEOTOMY  12/08/1998    Left foot bunion removal      HYSTEROSCOPY W ENDOMETRIAL BX/POLYPECTOMY W/WO D&C  12/21/2004      LAPAROSCOPY, SURGICAL; CHOLECYSTECTOMY  02/14/2005       Social History   Substance Use Topics     Smoking status: Never Smoker     Smokeless tobacco: Never Used     Alcohol use Yes      Comment: 3 beers a day      Family History   Problem Relation Age of Onset     Cancer Mother      cervical     Genitourinary Problems Mother      kidney disease stage 3     Arthritis Mother      Circulatory Mother      blood thinner; water pill     Lipids Mother      Respiratory Mother      tuberculosis; COPD     Hypertension Mother      Osteoporosis Mother      Other Cancer Mother      cervical     Unknown/Adopted Father      Cardiovascular Maternal Grandfather      heart attack     HEART DISEASE Maternal Grandfather      heart attack     Unknown/Adopted Paternal Grandmother      Unknown/Adopted Paternal Grandfather      Thyroid Disease Daughter      Diabetes Daughter      type 1     Diabetes Maternal Grandmother      Cerebrovascular Disease Maternal Grandmother      Thyroid Disease Daughter      C.A.D. No family hx of      Alzheimer Disease No family hx of      Blood Disease No family hx of      Eye Disorder No family hx of      GASTROINTESTINAL DISEASE No family hx of      Musculoskeletal Disorder No family hx of      Neurologic Disorder No family hx of      Breast Cancer No family hx of      Cancer - colorectal No family hx of      Prostate Cancer No family hx of      Asthma No family hx of      Ovarian Cancer No family hx of      Depression/Anxiety No family hx of      Anesthesia Reaction No family hx of      Thyroid  Disease No family hx of      Chemical Addiction No family hx of      Known Genetic Syndrome No family hx of          Current Outpatient Prescriptions   Medication Sig Dispense Refill     ASPIRIN 81 MG OR TABS ONE DAILY FOR PREVENTION OF STROKE 1 3     levothyroxine (SYNTHROID/LEVOTHROID) 25 MCG tablet Take 1 tablet (25 mcg) by mouth daily 90 tablet 3     Omega-3 Fatty Acids (FISH OIL PO) Take  by mouth.       oxybutynin (DITROPAN) 5 MG tablet Take 1 tablet (5 mg) by mouth daily 90 tablet 3     Allergies   Allergen Reactions     No Known Drug Allergies      Recent Labs   Lab Test  06/28/18   0928  01/19/18   1600  10/05/16   1104  01/05/16   1101  09/01/15   1014   12/11/14   0928  08/07/12   1000   A1C   --    --   5.3   --    --    --    --    --    LDL   --    --    --   94   --    --   103  111   HDL   --    --    --   69   --    --   74  61   TRIG   --    --    --   155*   --    --   140  100   ALT   --   37   --    --   32   --    --    --    CR   --   0.84   --   0.73  0.74   --   0.75  0.68   GFRESTIMATED   --   68   --   81  80   --   78  88   GFRESTBLACK   --   82   --   >90   GFR Calc    >90   GFR Calc     --   >90   GFR Calc    >90   POTASSIUM   --   3.7   --   4.1  4.4   --   4.2  4.6   TSH  3.18  4.98*   --   3.63  2.46   < >   --    --     < > = values in this interval not displayed.      BP Readings from Last 3 Encounters:   09/26/18 124/74   01/19/18 122/80   05/30/17 110/78    Wt Readings from Last 3 Encounters:   09/26/18 202 lb 4.8 oz (91.8 kg)   01/19/18 199 lb 6.4 oz (90.4 kg)   05/30/17 199 lb (90.3 kg)        Answers for HPI/ROS submitted by the patient on 9/23/2018   PHQ-2 Score: 0    ROS:  CONSTITUTIONAL: NEGATIVE for fever, chills, change in weight  INTEGUMENTARY/SKIN: NEGATIVE for worrisome rashes, moles or lesions and POSITIVE for redness and pain on bug bite   ENT/MOUTH: NEGATIVE for ear, mouth and throat problems  RESP: NEGATIVE for  "significant cough or SOB  CV: NEGATIVE for chest pain, palpitations or peripheral edema    OBJECTIVE:     /74 (BP Location: Left arm, Patient Position: Sitting, Cuff Size: Adult Large)  Pulse 67  Temp 97  F (36.1  C) (Temporal)  Resp 14  Ht 5' 2.5\" (1.588 m)  Wt 202 lb 4.8 oz (91.8 kg)  LMP 10/31/2004  SpO2 96%  BMI 36.41 kg/m2  Body mass index is 36.41 kg/(m^2).  GENERAL: healthy, alert and no distress  NECK: no adenopathy, no asymmetry, masses, or scars and thyroid normal to palpation  RESP: lungs clear to auscultation - no rales, rhonchi or wheezes  CV: regular rate and rhythm, normal S1 S2, no S3 or S4, no murmur, click or rub, no peripheral edema and peripheral pulses strong  MS: no gross musculoskeletal defects noted, no edema  SKIN: no suspicious rashes, small erythematous papule is noted on upper left buttock with some mild tenderness, no surrounding erythema, induration, redness, swelling or tenderness over the greater trochanter that reproduced her pain    Diagnostic Test Results:  No results found for this or any previous visit (from the past 24 hour(s)).  ASSESSMENT/PLAN:   1. Bug bite, initial encounter  Discussed plan with patient.  Small erythematous papule noted on upper left buttock with mild tenderness.  Should continue to improve.   Call for worsening.   Believe that the discomfort patient is noting now likely trochanteric bursitis. See below      2. Trochanteric bursitis of left hip  Discussed plan with patient.  Ice tid.   Discussed trial of prednisone but she declines.   Discussed referral to sports medicine. Consider injection.   All questions invited, asked and answered to the patient's apparent satisfaction.  Patient agrees to plan.      3. Hypothyroidism due to acquired atrophy of thyroid  Doing well. Well controlled. Tolerating medication.  No change in plan.   Refills have been ordered.   - levothyroxine (SYNTHROID/LEVOTHROID) 25 MCG tablet; Take 1 tablet (25 mcg) by mouth " daily  Dispense: 90 tablet; Refill: 3    4. Urge incontinence  Doing well. Well controlled. Tolerating medication.  No change in plan.    - oxybutynin (DITROPAN) 5 MG tablet; Take 1 tablet (5 mg) by mouth daily  Dispense: 90 tablet; Refill: 3    Follow up- Come back in 4 months for a follow up.     The information in this document, created by the medical scribe for me, accurately reflects the services I personally performed and the decisions made by me. I have reviewed and approved this document for accuracy prior to leaving the patient care area.    AARON RUIZ MD, MD  [facility]

## 2018-09-26 ENCOUNTER — OFFICE VISIT (OUTPATIENT)
Dept: FAMILY MEDICINE | Facility: CLINIC | Age: 68
End: 2018-09-26
Payer: COMMERCIAL

## 2018-09-26 VITALS
WEIGHT: 202.3 LBS | HEART RATE: 67 BPM | DIASTOLIC BLOOD PRESSURE: 74 MMHG | BODY MASS INDEX: 35.84 KG/M2 | SYSTOLIC BLOOD PRESSURE: 124 MMHG | TEMPERATURE: 97 F | RESPIRATION RATE: 14 BRPM | HEIGHT: 63 IN | OXYGEN SATURATION: 96 %

## 2018-09-26 DIAGNOSIS — N39.41 URGE INCONTINENCE: ICD-10-CM

## 2018-09-26 DIAGNOSIS — Z23 NEED FOR PROPHYLACTIC VACCINATION AND INOCULATION AGAINST INFLUENZA: ICD-10-CM

## 2018-09-26 DIAGNOSIS — E03.4 HYPOTHYROIDISM DUE TO ACQUIRED ATROPHY OF THYROID: ICD-10-CM
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
## 2018-09-26 DIAGNOSIS — M70.62 TROCHANTERIC BURSITIS OF LEFT HIP: ICD-10-CM

## 2018-09-26 PROCEDURE — 99214 OFFICE O/P EST MOD 30 MIN: CPT | Mod: 25 | Performed by: FAMILY MEDICINE

## 2018-09-26 PROCEDURE — 90662 IIV NO PRSV INCREASED AG IM: CPT | Performed by: FAMILY MEDICINE

## 2018-09-26 PROCEDURE — G0008 ADMIN INFLUENZA VIRUS VAC: HCPCS | Performed by: FAMILY MEDICINE

## 2018-09-26 RX ORDER — LEVOTHYROXINE SODIUM 25 UG/1
25 TABLET ORAL DAILY
Qty: 90 TABLET | Refills: 3 | Status: SHIPPED | OUTPATIENT
Start: 2018-09-26 | End: 2019-10-05

## 2018-09-26 RX ORDER — OXYBUTYNIN CHLORIDE 5 MG/1
5 TABLET ORAL DAILY
Qty: 90 TABLET | Refills: 3 | Status: SHIPPED | OUTPATIENT
Start: 2018-09-26 | End: 2019-10-05

## 2018-09-26 ASSESSMENT — PAIN SCALES - GENERAL: PAINLEVEL: NO PAIN (0)

## 2018-09-26 NOTE — PROGRESS NOTES
Injectable Influenza Immunization Documentation    1.  Is the person to be vaccinated sick today?   No    2. Does the person to be vaccinated have an allergy to a component   of the vaccine?   No  Egg Allergy Algorithm Link    3. Has the person to be vaccinated ever had a serious reaction   to influenza vaccine in the past?   No    4. Has the person to be vaccinated ever had Guillain-Barré syndrome?   No  Due to injection administration, patient instructed to remain in clinic for 15 minutes  afterwards, and to report any adverse reaction to me immediately.      Form completed by Patient/Jordan Thao MA

## 2018-09-26 NOTE — PATIENT INSTRUCTIONS
Your refills have been ordered.    You can apply ice three times a day to infected area to help the healing. If symptoms worsen, contact me.

## 2018-09-26 NOTE — MR AVS SNAPSHOT
After Visit Summary   9/26/2018    Patricia Haynes    MRN: [id number]           Patient Information     Date Of Birth          1950        Visit Information        Provider Department      9/26/2018 2:40 PM Kirsten Molina MD [facility]        Today's Diagnoses     Bug bite, initial encounter    -  1    Trochanteric bursitis of left hip        Hypothyroidism due to acquired atrophy of thyroid        Urge incontinence          Care Instructions    Your refills have been ordered.    You can apply ice three times a day to infected area to help the healing. If symptoms worsen, contact me.           Follow-ups after your visit        Follow-up notes from your care team     Return in about 4 months (around 1/26/2019) for Physical Exam.      Who to contact     If you have questions or need follow up information about today's clinic visit or your schedule please contact [facility] directly at [phone].  Normal or non-critical lab and imaging results will be communicated to you by MyChart, letter or phone within 4 business days after the clinic has received the results. If you do not hear from us within 7 days, please contact the clinic through [employer] or phone. If you have a critical or abnormal lab result, we will notify you by phone as soon as possible.  Submit refill requests through [employer] or call your pharmacy and they will forward the refill request to us. Please allow 3 business days for your refill to be completed.          Additional Information About Your Visit        MyChart Information     [employer] gives you secure access to your electronic health record. If you see a primary care provider, you can also send messages to your care team and make appointments. If you have questions, please call your primary care clinic.  If you do not have a primary care provider, please call [phone] and they will assist you.        Care EveryWhere ID     This is your Care  "EveryWhere ID. This could be used by other organizations to access your [facility] medical records  [id number]        Your Vitals Were     Pulse Temperature Respirations Height Last Period Pulse Oximetry    67 97  F (36.1  C) (Temporal) 14 5' 2.5\" (1.588 m) 10/31/2004 96%    BMI (Body Mass Index)                   36.41 kg/m2            Blood Pressure from Last 3 Encounters:   09/26/18 124/74   01/19/18 122/80   05/30/17 110/78    Weight from Last 3 Encounters:   09/26/18 202 lb 4.8 oz (91.8 kg)   01/19/18 199 lb 6.4 oz (90.4 kg)   05/30/17 199 lb (90.3 kg)              Today, you had the following     No orders found for display         Today's Medication Changes          These changes are accurate as of 9/26/18  3:13 PM.  If you have any questions, ask your nurse or doctor.               These medicines have changed or have updated prescriptions.        Dose/Directions    levothyroxine 25 MCG tablet   Commonly known as:  SYNTHROID/LEVOTHROID   This may have changed:  See the new instructions.   Used for:  Hypothyroidism due to acquired atrophy of thyroid   Changed by:  Kirsten Molina MD        Dose:  25 mcg   Take 1 tablet (25 mcg) by mouth daily   Quantity:  90 tablet   Refills:  3            Where to get your medicines      These medications were sent to [id number] - [place], [place] - [address]  [address], [facility] [address]    Hours:  test Rx sent successfully 12/26/02  KR Phone:  [phone]     levothyroxine 25 MCG tablet    oxybutynin 5 MG tablet                Primary Care Provider Office Phone # Fax #    Kirsten Molina [phone] [address] [address]        Equal Access to Services     HAL JOHNSON AH: Marli wright Soeyad, waaxda luqadaha, qaybta kaalmada eugene, jose ruvalcaba. So [facility] [phone].    ATENCIÓN: Si habla español, tiene a alonso disposición servicios gratuitos de asistencia " lingüística. Del al [phone].    We comply with applicable federal civil rights laws and [place] laws. We do not discriminate on the basis of race, color, national origin, age, disability, sex, sexual orientation, or gender identity.            Thank you!     Thank you for choosing [facility]  for your care. Our goal is always to provide you with excellent care. Hearing back from our patients is one way we can continue to improve our services. Please take a few minutes to complete the written survey that you may receive in the mail after your visit with us. Thank you!             Your Updated Medication List - Protect others around you: Learn how to safely use, store and throw away your medicines at www.disposemymeds.org.          This list is accurate as of 9/26/18  3:13 PM.  Always use your most recent med list.                   Brand Name Dispense Instructions for use Diagnosis    aspirin 81 MG tablet     1    ONE DAILY FOR PREVENTION OF STROKE    Routine gynecological examination       FISH OIL PO      Take  by mouth.        levothyroxine 25 MCG tablet    SYNTHROID/LEVOTHROID    90 tablet    Take 1 tablet (25 mcg) by mouth daily    Hypothyroidism due to acquired atrophy of thyroid       oxybutynin 5 MG tablet    DITROPAN    90 tablet    Take 1 tablet (5 mg) by mouth daily    Urge incontinence

## 2019-03-15 NOTE — PROGRESS NOTES
"SUBJECTIVE:   Patricia Haynes is a 68 year old female who presents for Preventive Visit.    Are you in the first 12 months of your Medicare coverage?  No    Annual Wellness Visit     In general, how would you rate your overall health?  Excellent    Frequency of exercise:  4-5 days/week    Duration of exercise:  Greater than 60 minutes    Do you usually eat at least 4 servings of fruit and vegetables a day, include whole grains    & fiber and avoid regularly eating high fat or \"junk\" foods?  Yes    Taking medications regularly:  Yes    Medication side effects:  Other    Ability to successfully perform activities of daily living:  No assistance needed    Home Safety:  No safety concerns identified    Hearing Impairment:  No hearing concerns    In the past 6 months, have you been bothered by leaking of urine?  No    In general, how would you rate your overall mental or emotional health?  Excellent    PHQ-2 Total Score: 0    Additional concerns today:  No    Do you feel safe in your environment? Yes    Do you have a Health Care Directive? Yes: Patient states has Advance Directive and will bring in a copy to clinic.      Fall risk  Fallen 2 or more times in the past year?: No  Any fall with injury in the past year?: No    Cognitive Screening   1) Repeat 3 items (Leader, Season, Table)    2) Clock draw: NORMAL  3) 3 item recall: Recalls 3 objects  Results: 3 items recalled: COGNITIVE IMPAIRMENT LESS LIKELY    Mini-CogTM Copyright VIK Patel. Licensed by the author for use in [facility]; reprinted with permission (everette@.[email or web address]). All rights reserved.      Do you have sleep apnea, excessive snoring or daytime drowsiness?: no    Reviewed and updated as needed this visit by clinical staff  Tobacco  Allergies  Meds  Med Hx  Surg Hx  Fam Hx  Soc Hx        Reviewed and updated as needed this visit by Provider        Social History     Tobacco Use     Smoking status: Never Smoker     Smokeless tobacco: Never Used "   Substance Use Topics     Alcohol use: Yes     Comment: 3 beers a day        No flowsheet data found.No flowsheet data found.    Weight   The patient states that she has a hard time losing weight. She states that she cut out alcohol for 6 weeks. She notes that she lost some weight, but gained 2 pounds back.   She states that her  lost 12 pounds from the diet, so she decided to try it.     Shingles   The patient states that she had the old Shingles Vaccine in 2012. She notes that she is interested in having the new Shingles vaccine. She is wondering if she can have it completed here or if she should go to her local Pharmacy.     Current providers sharing in care for this patient include:   Patient Care Team:  Kirsten Molina MD as PCP - General (Family Practice)  Kirsten Molina MD as Assigned PCP    The following health maintenance items are reviewed in Epic and correct as of today:  Health Maintenance   Topic Date Due     HEPATITIS C SCREENING  08/27/1969     ZOSTER IMMUNIZATION (2 of 3) 08/14/2012     MEDICARE ANNUAL WELLNESS VISIT  01/05/2017     LIPID MONITORING Q1 YEAR  01/05/2017     MICROALBUMIN Q1 YEAR  01/05/2017     ADVANCE DIRECTIVE PLANNING Q5 YRS  08/07/2017     BMP Q1 YR  01/19/2019     FALL RISK ASSESSMENT  01/19/2019     MAMMO SCREEN Q2 YR (SYSTEM ASSIGNED)  05/16/2019     PHQ-2 Q1 YR  09/26/2019     DTAP/TDAP/TD IMMUNIZATION (3 - Td) 02/01/2020     COLON CANCER SCREEN (SYSTEM ASSIGNED)  03/11/2020     DEXA SCAN SCREENING (SYSTEM ASSIGNED)  Completed     INFLUENZA VACCINE  Completed     IPV IMMUNIZATION  Aged Out     MENINGITIS IMMUNIZATION  Aged Out     BP Readings from Last 3 Encounters:   04/02/19 122/82   09/26/18 124/74   01/19/18 122/80    Wt Readings from Last 3 Encounters:   04/02/19 88.9 kg (196 lb)   09/26/18 91.8 kg (202 lb 4.8 oz)   01/19/18 90.4 kg (199 lb 6.4 oz)                  Patient Active Problem List   Diagnosis     CARDIOVASCULAR SCREENING; LDL GOAL LESS THAN 160      Postmenopausal- LMP 2001     Fibroid, uterine     Ganglion cyst     Advanced directives, counseling/discussion     CKD (chronic kidney disease) stage 2, GFR 60-89 ml/min     Urinary urgency     Atrophic vaginitis     Urinary frequency     Urge incontinence     Hypothyroidism due to acquired atrophy of thyroid     Mild concentric left ventricular hypertrophy (LVH)     Past Surgical History:   Procedure Laterality Date     C REPAIR CRUCIATE LIGAMENT,KNEE  1996    Left knee     COLONOSCOPY  03/11/10    moderate internal hemorrhoids- 10 yr f/u     HC CORRECT BUNION,DOUBLE OSTEOTOMY  12/08/1998    Left foot bunion removal      HYSTEROSCOPY W ENDOMETRIAL BX/POLYPECTOMY W/WO D&C  12/21/2004      LAPAROSCOPY, SURGICAL; CHOLECYSTECTOMY  02/14/2005       Social History     Tobacco Use     Smoking status: Never Smoker     Smokeless tobacco: Never Used   Substance Use Topics     Alcohol use: Yes     Comment: 3 beers a day      Family History   Problem Relation Age of Onset     Cancer Mother         cervical     Genitourinary Problems Mother         kidney disease stage 3     Arthritis Mother      Circulatory Mother         blood thinner; water pill     Lipids Mother      Respiratory Mother         tuberculosis; COPD     Hypertension Mother      Osteoporosis Mother      Other Cancer Mother         cervical     Unknown/Adopted Father      Cardiovascular Maternal Grandfather         heart attack     Heart Disease Maternal Grandfather         heart attack     Unknown/Adopted Paternal Grandmother      Unknown/Adopted Paternal Grandfather      Thyroid Disease Daughter      Diabetes Daughter         type 1     Diabetes Maternal Grandmother      Cerebrovascular Disease Maternal Grandmother      Thyroid Disease Daughter      C.A.D. No family hx of      Alzheimer Disease No family hx of      Blood Disease No family hx of      Eye Disorder No family hx of      Gastrointestinal Disease No family hx of      Musculoskeletal Disorder  No family hx of      Neurologic Disorder No family hx of      Breast Cancer No family hx of      Cancer - colorectal No family hx of      Prostate Cancer No family hx of      Asthma No family hx of      Ovarian Cancer No family hx of      Depression/Anxiety No family hx of      Anesthesia Reaction No family hx of      Thyroid Disease No family hx of      Chemical Addiction No family hx of      Known Genetic Syndrome No family hx of          Current Outpatient Medications   Medication Sig Dispense Refill     ASPIRIN 81 MG OR TABS ONE DAILY FOR PREVENTION OF STROKE 1 3     levothyroxine (SYNTHROID/LEVOTHROID) 25 MCG tablet Take 1 tablet (25 mcg) by mouth daily 90 tablet 3     Omega-3 Fatty Acids (FISH OIL PO) Take  by mouth.       oxybutynin (DITROPAN) 5 MG tablet Take 1 tablet (5 mg) by mouth daily 90 tablet 3     Allergies   Allergen Reactions     No Known Drug Allergies      Recent Labs   Lab Test 06/28/18  0928 01/19/18  1600 10/05/16  1104 01/05/16  1101 09/01/15  1014  12/11/14  0928 08/07/12  1000   A1C  --   --  5.3  --   --   --   --   --    LDL  --   --   --  94  --   --  103 111   HDL  --   --   --  69  --   --  74 61   TRIG  --   --   --  155*  --   --  140 100   ALT  --  37  --   --  32  --   --   --    CR  --  0.84  --  0.73 0.74  --  0.75 0.68   GFRESTIMATED  --  68  --  81 80  --  78 88   GFRESTBLACK  --  82  --  >90   GFR Calc   >90   GFR Calc    --  >90   GFR Calc   >90   POTASSIUM  --  3.7  --  4.1 4.4  --  4.2 4.6   TSH 3.18 4.98*  --  3.63 2.46   < >  --   --     < > = values in this interval not displayed.        Review of Systems  CONSTITUTIONAL: NEGATIVE for fever, chills; POSITIVE for weight change   INTEGUMENTARY/SKIN: NEGATIVE for worrisome rashes, moles or lesions  EYES: NEGATIVE for vision changes or irritation  ENT/MOUTH: NEGATIVE for ear, mouth and throat problems  RESP: NEGATIVE for significant cough or SOB  BREAST: NEGATIVE for masses,  "tenderness or discharge  CV: NEGATIVE for chest pain, palpitations or peripheral edema  GI: NEGATIVE for nausea, abdominal pain, heartburn, or change in bowel habits  : NEGATIVE for frequency, dysuria, or hematuria  MUSCULOSKELETAL: NEGATIVE for significant arthralgias or myalgia  NEURO: NEGATIVE for weakness, dizziness or paresthesias  ENDOCRINE: NEGATIVE for temperature intolerance, skin/hair changes  HEME: NEGATIVE for bleeding problems  PSYCHIATRIC: NEGATIVE for changes in mood or affect    This document serves as a record of the services and decisions personally performed and made by Kirsten Molina MD. It was created on her behalf by Jaclyn Aguirre, a trained medical scribe. The creation of this document is based the provider's statements to the medical scribe.    Jaclyn Aguirre April 2, 2019 10:38 AM  OBJECTIVE:   /82   Pulse 68   Temp 96.6  F (35.9  C) (Temporal)   Resp 16   Ht 1.58 m (5' 2.21\")   Wt 88.9 kg (196 lb)   LMP 10/31/2004   SpO2 97%   BMI 35.61 kg/m   Estimated body mass index is 35.61 kg/m  as calculated from the following:    Height as of this encounter: 1.58 m (5' 2.21\").    Weight as of this encounter: 88.9 kg (196 lb).  Physical Exam  GENERAL APPEARANCE: healthy, alert and no distress  EYES: Eyes grossly normal to inspection, PERRL and conjunctivae and sclerae normal  HENT: ear canals and TM's normal, nose and mouth without ulcers or lesions, oropharynx clear and oral mucous membranes moist  NECK: no adenopathy, no asymmetry, masses, or scars and thyroid normal to palpation  RESP: lungs clear to auscultation - no rales, rhonchi or wheezes  BREAST: normal without masses, tenderness or nipple discharge and no palpable axillary masses or adenopathy  CV: regular rate and rhythm, normal S1 S2, no S3 or S4, no murmur, click or rub, no peripheral edema and peripheral pulses strong  ABDOMEN: soft, nontender, no hepatosplenomegaly, no masses and bowel sounds normal  MS: no " "musculoskeletal defects are noted and gait is age appropriate without ataxia  SKIN: no suspicious lesions or rashes  NEURO: Normal strength and tone, sensory exam grossly normal, mentation intact and speech normal  PSYCH: mentation appears normal and affect normal/bright    Diagnostic Test Results:  No results found for this or any previous visit (from the past 24 hour(s)).    ASSESSMENT / PLAN:   1. Routine history and physical examination of adult  Physical exam was administered today.  See counseling messages below.     2. Hypothyroidism due to acquired atrophy of thyroid  Doing well. Well controlled. Tolerating medication.  No change in plan.     3. CKD (chronic kidney disease) stage 2, GFR 60-89 ml/min  Labs have been ordered.  Awaiting results.   - Albumin Random Urine Quantitative with Creat Ratio  - Comprehensive metabolic panel (BMP + Alb, Alk Phos, ALT, AST, Total. Bili, TP)    4. Need for hepatitis C screening test  Advised patient of CDC recommendation for Hepatitis C screening.  Patient agreed.  Labs have been ordered.  Will notify with results.   - Hepatitis C Screen Reflex to HCV RNA Quant and Genotype    5. Lipid screening  Labs have been ordered.  Awaiting results.  - Lipid panel reflex to direct LDL Fasting    6. Impaired fasting glucose  Labs have been ordered.   Will notify with results.   - Comprehensive metabolic panel (BMP + Alb, Alk Phos, ALT, AST, Total. Bili, TP)  - Hemoglobin A1c    End of Life Planning:  Patient currently has an advanced directive: Yes.  Practitioner is supportive of decision.    COUNSELING:  Special attention given to:       Regular exercise       Healthy diet/nutrition       Vision screening       Fall risk prevention       Hepatitis C screening    BP Readings from Last 1 Encounters:   04/02/19 122/82     Estimated body mass index is 35.61 kg/m  as calculated from the following:    Height as of this encounter: 1.58 m (5' 2.21\").    Weight as of this encounter: 88.9 kg " (196 lb).      Weight management plan: Discussed healthy diet and exercise guidelines     reports that  has never smoked. she has never used smokeless tobacco.      Appropriate preventive services were discussed with this patient, including applicable screening as appropriate for cardiovascular disease, diabetes, osteopenia/osteoporosis, and glaucoma.  As appropriate for age/gender, discussed screening for colorectal cancer, prostate cancer, breast cancer, and cervical cancer. Checklist reviewing preventive services available has been given to the patient.    Reviewed patients plan of care and provided an AVS. The Basic Care Plan (routine screening as documented in Health Maintenance) for Patricia meets the Care Plan requirement. This Care Plan has been established and reviewed with the Patient.    Counseling Resources:  ATP IV Guidelines  Pooled Cohorts Equation Calculator  Breast Cancer Risk Calculator  FRAX Risk Assessment  ICSI Preventive Guidelines  Dietary Guidelines for Americans, 2010  USDA's MyPlate  ASA Prophylaxis  Lung CA Screening    The information in this document, created by the medical scribe for me, accurately reflects the services I personally performed and the decisions made by me. I have reviewed and approved this document for accuracy prior to leaving the patient care area.    AARON RUIZ MD, MD  [facility]

## 2019-03-15 NOTE — PATIENT INSTRUCTIONS
Preventive Health Recommendations    See your health care provider every year to    Review health changes.     Discuss preventive care.      Review your medicines if your doctor has prescribed any.      You no longer need a yearly Pap test unless you've had an abnormal Pap test in the past 10 years. If you have vaginal symptoms, such as bleeding or discharge, be sure to talk with your provider about a Pap test.      Every 1 to 2 years, have a mammogram.  If you are over 69, talk with your health care provider about whether or not you want to continue having screening mammograms.      Every 10 years, have a colonoscopy. Or, have a yearly FIT test (stool test). These exams will check for colon cancer.       Have a cholesterol test every 5 years, or more often if your doctor advises it.       Have a diabetes test (fasting glucose) every three years. If you are at risk for diabetes, you should have this test more often.       At age 65, have a bone density scan (DEXA) to check for osteoporosis (brittle bone disease).    Shots:    Get a flu shot each year.    Get a tetanus shot every 10 years.    Talk to your doctor about your pneumonia vaccines. There are now two you should receive - Pneumovax (PPSV 23) and Prevnar (PCV 13).    Talk to your pharmacist about the shingles vaccine.    Talk to your doctor about the hepatitis B vaccine.    Nutrition:     Eat at least 5 servings of fruits and vegetables each day.      Eat whole-grain bread, whole-wheat pasta and brown rice instead of white grains and rice.      Get adequate Calcium and Vitamin D.     Lifestyle    Exercise at least 150 minutes a week (30 minutes a day, 5 days a week). This will help you control your weight and prevent disease.      Limit alcohol to one drink per day.      No smoking.       Wear sunscreen to prevent skin cancer.       See your dentist twice a year for an exam and cleaning.      See your eye doctor every 1 to 2 years to screen for conditions  such as glaucoma, macular degeneration and cataracts.    Personalized Prevention Plan  You are due for the preventive services outlined below.  Your care team is available to assist you in scheduling these services.  If you have already completed any of these items, please share that information with your care team to update in your medical record.  Health Maintenance Due   Topic Date Due     Hepatitis C Screening  08/27/1968     Zoster (Shingles) Vaccine (2 of 3) 08/14/2012     Annual Wellness Visit  01/05/2017     Cholesterol Lab - yearly  01/05/2017     Microalbumin Lab - yearly  01/05/2017     Discuss Advance Directive Planning  08/07/2017     Pap Smear - every 3 years  01/05/2019     Basic Metabolic Lab - yearly  01/19/2019     FALL RISK ASSESSMENT  01/19/2019

## 2019-03-22 ENCOUNTER — HEALTH MAINTENANCE LETTER (OUTPATIENT)
Age: 69
End: 2019-03-22

## 2019-04-02 ENCOUNTER — OFFICE VISIT (OUTPATIENT)
Dept: FAMILY MEDICINE | Facility: CLINIC | Age: 69
End: 2019-04-02
Payer: MEDICARE

## 2019-04-02 ENCOUNTER — ANCILLARY PROCEDURE (OUTPATIENT)
Dept: MAMMOGRAPHY | Facility: CLINIC | Age: 69
End: 2019-04-02
Attending: FAMILY MEDICINE
Payer: MEDICARE

## 2019-04-02 VITALS
HEART RATE: 68 BPM | OXYGEN SATURATION: 97 % | BODY MASS INDEX: 36.07 KG/M2 | HEIGHT: 62 IN | WEIGHT: 196 LBS | SYSTOLIC BLOOD PRESSURE: 122 MMHG | TEMPERATURE: 96.6 F | DIASTOLIC BLOOD PRESSURE: 82 MMHG | RESPIRATION RATE: 16 BRPM

## 2019-04-02 DIAGNOSIS — Z13.220 LIPID SCREENING: ICD-10-CM

## 2019-04-02 DIAGNOSIS — E03.4 HYPOTHYROIDISM DUE TO ACQUIRED ATROPHY OF THYROID: ICD-10-CM

## 2019-04-02 DIAGNOSIS — N18.2 CKD (CHRONIC KIDNEY DISEASE) STAGE 2, GFR 60-89 ML/MIN: ICD-10-CM

## 2019-04-02 DIAGNOSIS — R73.01 IMPAIRED FASTING GLUCOSE: ICD-10-CM

## 2019-04-02 DIAGNOSIS — Z11.59 NEED FOR HEPATITIS C SCREENING TEST: ICD-10-CM

## 2019-04-02 DIAGNOSIS — Z00.00 ROUTINE HISTORY AND PHYSICAL EXAMINATION OF ADULT: Primary | ICD-10-CM

## 2019-04-02 LAB
ALBUMIN SERPL-MCNC: 3.9 G/DL (ref 3.4–5)
ALP SERPL-CCNC: 54 U/L (ref 40–150)
ALT SERPL W P-5'-P-CCNC: 36 U/L (ref 0–50)
ANION GAP SERPL CALCULATED.3IONS-SCNC: 6 MMOL/L (ref 3–14)
AST SERPL W P-5'-P-CCNC: 18 U/L (ref 0–45)
BILIRUB SERPL-MCNC: 0.5 MG/DL (ref 0.2–1.3)
BUN SERPL-MCNC: 12 MG/DL (ref 7–30)
CALCIUM SERPL-MCNC: 9.5 MG/DL (ref 8.5–10.1)
CHLORIDE SERPL-SCNC: 109 MMOL/L (ref 94–109)
CHOLEST SERPL-MCNC: 216 MG/DL
CO2 SERPL-SCNC: 26 MMOL/L (ref 20–32)
CREAT SERPL-MCNC: 0.55 MG/DL (ref 0.52–1.04)
CREAT UR-MCNC: 50 MG/DL
GFR SERPL CREATININE-BSD FRML MDRD: >90 ML/MIN/{1.73_M2}
GLUCOSE SERPL-MCNC: 108 MG/DL (ref 70–99)
HBA1C MFR BLD: 5.5 % (ref 0–5.6)
HCV AB SERPL QL IA: NONREACTIVE
HDLC SERPL-MCNC: 59 MG/DL
LDLC SERPL CALC-MCNC: 129 MG/DL
MICROALBUMIN UR-MCNC: <5 MG/L
MICROALBUMIN/CREAT UR: NORMAL MG/G CR (ref 0–25)
NONHDLC SERPL-MCNC: 157 MG/DL
POTASSIUM SERPL-SCNC: 4.1 MMOL/L (ref 3.4–5.3)
PROT SERPL-MCNC: 7.1 G/DL (ref 6.8–8.8)
SODIUM SERPL-SCNC: 141 MMOL/L (ref 133–144)
TRIGL SERPL-MCNC: 139 MG/DL

## 2019-04-02 PROCEDURE — G0439 PPPS, SUBSEQ VISIT: HCPCS | Performed by: FAMILY MEDICINE

## 2019-04-02 PROCEDURE — 82043 UR ALBUMIN QUANTITATIVE: CPT | Performed by: FAMILY MEDICINE

## 2019-04-02 PROCEDURE — 80061 LIPID PANEL: CPT | Performed by: FAMILY MEDICINE

## 2019-04-02 PROCEDURE — 77067 SCR MAMMO BI INCL CAD: CPT | Mod: TC

## 2019-04-02 PROCEDURE — 36415 COLL VENOUS BLD VENIPUNCTURE: CPT | Performed by: FAMILY MEDICINE

## 2019-04-02 PROCEDURE — 80053 COMPREHEN METABOLIC PANEL: CPT | Performed by: FAMILY MEDICINE

## 2019-04-02 PROCEDURE — 83036 HEMOGLOBIN GLYCOSYLATED A1C: CPT | Performed by: FAMILY MEDICINE

## 2019-04-02 PROCEDURE — 86803 HEPATITIS C AB TEST: CPT | Performed by: FAMILY MEDICINE

## 2019-04-02 ASSESSMENT — MIFFLIN-ST. JEOR: SCORE: 1380.55

## 2019-04-02 ASSESSMENT — PAIN SCALES - GENERAL: PAINLEVEL: NO PAIN (0)

## 2019-04-02 ASSESSMENT — ACTIVITIES OF DAILY LIVING (ADL): CURRENT_FUNCTION: NO ASSISTANCE NEEDED

## 2019-09-28 ENCOUNTER — HEALTH MAINTENANCE LETTER (OUTPATIENT)
Age: 69
End: 2019-09-28

## 2020-01-18 DIAGNOSIS — E03.4 HYPOTHYROIDISM DUE TO ACQUIRED ATROPHY OF THYROID: ICD-10-CM

## 2020-01-20 RX ORDER — LEVOTHYROXINE SODIUM 25 UG/1
TABLET ORAL
Qty: 90 TABLET | Refills: 0 | Status: SHIPPED | OUTPATIENT
Start: 2020-01-20 | End: 2020-04-30

## 2020-01-20 NOTE — TELEPHONE ENCOUNTER
Due for recheck of thyroid labs. Please assist in scheduling lab visit. Due for office visit in April.

## 2020-01-20 NOTE — TELEPHONE ENCOUNTER
Pending Prescriptions:                       Disp   Refills    levothyroxine (SYNTHROID/LEVOTHROID) 25 MC*90 tab*0        Sig: TAKE ONE TABLET BY MOUTH ONCE DAILY      Routing refill request to provider for review/approval because:  Labs not current:  TSH    Luzmaria Durán, MSN, RN

## 2020-01-22 DIAGNOSIS — E03.4 HYPOTHYROIDISM DUE TO ACQUIRED ATROPHY OF THYROID: ICD-10-CM

## 2020-01-22 LAB — TSH SERPL DL<=0.005 MIU/L-ACNC: 3 MU/L (ref 0.4–4)

## 2020-01-22 PROCEDURE — 36415 COLL VENOUS BLD VENIPUNCTURE: CPT | Performed by: FAMILY MEDICINE

## 2020-01-22 PROCEDURE — 84443 ASSAY THYROID STIM HORMONE: CPT | Performed by: FAMILY MEDICINE

## 2020-01-26 DIAGNOSIS — E03.4 HYPOTHYROIDISM DUE TO ACQUIRED ATROPHY OF THYROID: ICD-10-CM

## 2020-01-28 RX ORDER — LEVOTHYROXINE SODIUM 25 UG/1
TABLET ORAL
Qty: 90 TABLET | Refills: 0 | OUTPATIENT
Start: 2020-01-28

## 2020-01-28 NOTE — TELEPHONE ENCOUNTER
Pending Prescriptions:                       Disp   Refills    levothyroxine (SYNTHROID/LEVOTHROID) 25 M*90 tab*0            Sig: TAKE ONE TABLET BY MOUTH ONCE DAILY    Shaista Durán, MSN, RN

## 2020-02-03 ENCOUNTER — MYC REFILL (OUTPATIENT)
Dept: FAMILY MEDICINE | Facility: CLINIC | Age: 70
End: 2020-02-03

## 2020-02-03 DIAGNOSIS — E03.4 HYPOTHYROIDISM DUE TO ACQUIRED ATROPHY OF THYROID: ICD-10-CM

## 2020-02-05 RX ORDER — LEVOTHYROXINE SODIUM 25 UG/1
25 TABLET ORAL DAILY
Qty: 90 TABLET | Refills: 0 | OUTPATIENT
Start: 2020-02-05

## 2020-02-05 NOTE — TELEPHONE ENCOUNTER
90 day supply was sent on 1/20/2020, should not be out at this time.    Oneil Pereyra, RN, BSN

## 2020-04-04 DIAGNOSIS — N39.41 URGE INCONTINENCE: ICD-10-CM

## 2020-04-06 RX ORDER — OXYBUTYNIN CHLORIDE 5 MG/1
TABLET ORAL
Qty: 90 TABLET | Refills: 0 | Status: SHIPPED | OUTPATIENT
Start: 2020-04-06 | End: 2020-05-04

## 2020-04-06 NOTE — TELEPHONE ENCOUNTER
Pending Prescriptions:                       Disp   Refills    oxybutynin (DITROPAN) 5 MG tablet [Pharma*90 tab*0            Sig: TAKE ONE TABLET BY MOUTH ONCE DAILY    Medication is being filled for 1 time refill only due to:  Patient needs to be seen because it has been more than one year since last visit.     Luzmaria Durán, MSN, RN

## 2020-04-29 DIAGNOSIS — E03.4 HYPOTHYROIDISM DUE TO ACQUIRED ATROPHY OF THYROID: ICD-10-CM

## 2020-04-29 NOTE — TELEPHONE ENCOUNTER
Pending Prescriptions:                       Disp   Refills    levothyroxine (SYNTHROID/LEVOTHROID) 25 M*90 tab*0            Sig: TAKE ONE TABLET BY MOUTH ONCE DAILY    LOV 4/2019, please help schedule a visit    Luzmaria Durán, MSN, RN

## 2020-04-30 RX ORDER — LEVOTHYROXINE SODIUM 25 UG/1
TABLET ORAL
Qty: 90 TABLET | Refills: 0 | Status: SHIPPED | OUTPATIENT
Start: 2020-04-30 | End: 2020-05-04

## 2020-04-30 NOTE — PROGRESS NOTES
"Patricia Haynes is a 69 year old female who is being evaluated via a billable telephone visit.      The patient has been notified of following:     \"This telephone visit will be conducted via a call between you and your physician/provider. We have found that certain health care needs can be provided without the need for a physical exam.  This service lets us provide the care you need with a short phone conversation.  If a prescription is necessary we can send it directly to your pharmacy.  If lab work is needed we can place an order for that and you can then stop by our lab to have the test done at a later time.    Telephone visits are billed at different rates depending on your insurance coverage. During this emergency period, for some insurers they may be billed the same as an in-person visit.  Please reach out to your insurance provider with any questions.    If during the course of the call the physician/provider feels a telephone visit is not appropriate, you will not be charged for this service.\"    Patient has given verbal consent for Telephone visit?  Yes    How would you like to obtain your AVS? MyChart    Subjective     Patricia Haynes is a 69 year old female who presents to clinic today for the following health issues:    HPI       Hypothyroidism Follow-up      Since last visit, patient describes the following symptoms: Weight stable, no hair loss, no skin changes, no constipation, no loose stools    Patient stated she has been experiencing  a \" Racey\"  heart. Noted with [facility].   Had arrhythmia eval and stress test- normal 3-4 years ago. Noting at 5 pm and evenings.   Uses cell vishnu. For heart Rate increases to 92- normal 57-62 at rest.   Denies anxiety.   Feels it is irregular.   SVT- A-tach. Seen on Ziopatch    Lasts only a few minutes, not sustained. Possible alcohol consumption at these times.         Doing Zoom exercise- 1 hour twice/week. Walking other days.   Nutrition- excellent.     Her pulse " "is normally 56. She stated it has been running 90 -92. She believes it is a side effect from her Oxybutynin.      Overactive Bladder:    Has dry mouth on Oxybutynin- dentist gave her Sensodyne and water pic.  Bladder is \"excellent\"      Patient Active Problem List   Diagnosis     CARDIOVASCULAR SCREENING; LDL GOAL LESS THAN 160     Postmenopausal- LMP 2001     Fibroid, uterine     Ganglion cyst     Advanced directives, counseling/discussion     CKD (chronic kidney disease) stage 2, GFR 60-89 ml/min     Urinary urgency     Atrophic vaginitis     Urinary frequency     Urge incontinence     Hypothyroidism due to acquired atrophy of thyroid     Mild concentric left ventricular hypertrophy (LVH)     Past Surgical History:   Procedure Laterality Date     C REPAIR CRUCIATE LIGAMENT,KNEE  1996    Left knee     COLONOSCOPY  03/11/10    moderate internal hemorrhoids- 10 yr f/u     HC CORRECT BUNION,DOUBLE OSTEOTOMY  12/08/1998    Left foot bunion removal      HYSTEROSCOPY W ENDOMETRIAL BX/POLYPECTOMY W/WO D&C  12/21/2004     HC LAPAROSCOPY, SURGICAL; CHOLECYSTECTOMY  02/14/2005       Social History     Tobacco Use     Smoking status: Never Smoker     Smokeless tobacco: Never Used   Substance Use Topics     Alcohol use: Yes     Comment: 3 beers a day      Family History   Problem Relation Age of Onset     Cancer Mother         cervical     Genitourinary Problems Mother         kidney disease stage 3     Arthritis Mother      Circulatory Mother         blood thinner; water pill     Lipids Mother      Respiratory Mother         tuberculosis; COPD     Hypertension Mother      Osteoporosis Mother      Other Cancer Mother         cervical     Unknown/Adopted Father      Cardiovascular Maternal Grandfather         heart attack     Heart Disease Maternal Grandfather         heart attack     Unknown/Adopted Paternal Grandmother      Unknown/Adopted Paternal Grandfather      Thyroid Disease Daughter      Diabetes Daughter         " type 1     Diabetes Maternal Grandmother      Cerebrovascular Disease Maternal Grandmother      Thyroid Disease Daughter      C.A.D. No family hx of      Alzheimer Disease No family hx of      Blood Disease No family hx of      Eye Disorder No family hx of      Gastrointestinal Disease No family hx of      Musculoskeletal Disorder No family hx of      Neurologic Disorder No family hx of      Breast Cancer No family hx of      Cancer - colorectal No family hx of      Prostate Cancer No family hx of      Asthma No family hx of      Ovarian Cancer No family hx of      Depression/Anxiety No family hx of      Anesthesia Reaction No family hx of      Thyroid Disease No family hx of      Chemical Addiction No family hx of      Known Genetic Syndrome No family hx of            Reviewed and updated as needed this visit by Provider  Tobacco  Allergies  Meds  Problems  Med Hx  Surg Hx  Fam Hx         Review of Systems   ROS COMP: Constitutional, HEENT, cardiovascular, pulmonary, gi and gu systems are negative, except as otherwise noted.       Objective   Reported vitals:  LMP 10/31/2004    healthy, alert and no distress  PSYCH: Alert and oriented times 3; coherent speech, normal   rate and volume, able to articulate logical thoughts, able   to abstract reason, no tangential thoughts, no hallucinations   or delusions  Her affect is normal and pleasant  RESP: No cough, no audible wheezing, able to talk in full sentences  Remainder of exam unable to be completed due to telephone visits    Diagnostic Test Results:  Labs reviewed in Epic        Assessment/Plan:  1. Hypothyroidism due to acquired atrophy of thyroid  Stable, meds refilled  - levothyroxine (SYNTHROID/LEVOTHROID) 25 MCG tablet; Take 1 tablet (25 mcg) by mouth daily  Dispense: 90 tablet; Refill: 0    2. Urge incontinence  Stable meds refilled.   - oxybutynin (DITROPAN) 5 MG tablet; Take 1 tablet (5 mg) by mouth daily  Dispense: 90 tablet; Refill:  "1    Palpitations: benign at this time, had previosu work-up. Warning signs and ED eval. If lasting over 5-10 minutes. WIll work-up if lasting over 5 mintues.     Return in about 6 months (around 11/4/2020).    Patient is stable for home plan.  Continue plan.    \"Stay at Home\" cares.   Refills done.     Plan changes: none  Labs/Imaging:     Post-Covid (yes/no): yes   Phone call duration:  8.5 minutes    RAFAEL Ortiz CNP        "

## 2020-05-04 ENCOUNTER — VIRTUAL VISIT (OUTPATIENT)
Dept: FAMILY MEDICINE | Facility: OTHER | Age: 70
End: 2020-05-04
Payer: MEDICARE

## 2020-05-04 DIAGNOSIS — E03.4 HYPOTHYROIDISM DUE TO ACQUIRED ATROPHY OF THYROID: ICD-10-CM

## 2020-05-04 DIAGNOSIS — N39.41 URGE INCONTINENCE: ICD-10-CM

## 2020-05-04 DIAGNOSIS — R00.2 PALPITATIONS: Primary | ICD-10-CM

## 2020-05-04 PROCEDURE — 99441 ZZC PHYSICIAN TELEPHONE EVALUATION 5-10 MIN: CPT | Performed by: NURSE PRACTITIONER

## 2020-05-04 RX ORDER — LEVOTHYROXINE SODIUM 25 UG/1
25 TABLET ORAL DAILY
Qty: 90 TABLET | Refills: 0 | Status: SHIPPED | OUTPATIENT
Start: 2020-05-04 | End: 2020-10-29

## 2020-05-04 RX ORDER — OXYBUTYNIN CHLORIDE 5 MG/1
5 TABLET ORAL DAILY
Qty: 90 TABLET | Refills: 1 | Status: SHIPPED | OUTPATIENT
Start: 2020-05-04 | End: 2021-05-11

## 2020-10-29 DIAGNOSIS — E03.4 HYPOTHYROIDISM DUE TO ACQUIRED ATROPHY OF THYROID: ICD-10-CM

## 2020-10-29 RX ORDER — LEVOTHYROXINE SODIUM 25 UG/1
TABLET ORAL
Qty: 90 TABLET | Refills: 0 | Status: SHIPPED | OUTPATIENT
Start: 2020-10-29 | End: 2021-01-27

## 2020-10-29 NOTE — TELEPHONE ENCOUNTER
Prescription approved per [facility] Refill Protocol.  Jordan Thao RN  October 29, 2020

## 2020-11-01 DIAGNOSIS — E03.4 HYPOTHYROIDISM DUE TO ACQUIRED ATROPHY OF THYROID: ICD-10-CM

## 2020-11-02 ENCOUNTER — NURSE TRIAGE (OUTPATIENT)
Dept: FAMILY MEDICINE | Facility: CLINIC | Age: 70
End: 2020-11-02

## 2020-11-02 RX ORDER — LEVOTHYROXINE SODIUM 25 UG/1
TABLET ORAL
Qty: 90 TABLET | Refills: 0 | OUTPATIENT
Start: 2020-11-02

## 2020-11-02 NOTE — PROGRESS NOTES
Subjective     Patricia Haynes is a 70 year old female who presents to clinic today for the following health issues:    HPI         {SUPERLIST (Optional):471321}  {additonal problems for provider to add (Optional):164917}    Review of Systems   {ROS COMP (Optional):383749}      Objective    LMP 10/31/2004   There is no height or weight on file to calculate BMI.  Physical Exam   {Exam List (Optional):246618}    {Diagnostic Test Results (Optional):255574}        {PROVIDER CHARTING PREFERENCE:361310}

## 2020-11-02 NOTE — TELEPHONE ENCOUNTER
"LM for the patient to return call to the clinic.   Please transfer to any triage RN.     BACKGROUND:   Patient is scheduled with  on 11/3/20 for a physical. One of the topics to discuss is \"irregular heartbeat.\" Please find out more information.  Jordan Thao RN  October 21, 2020    "

## 2020-11-03 ENCOUNTER — OFFICE VISIT (OUTPATIENT)
Dept: FAMILY MEDICINE | Facility: CLINIC | Age: 70
End: 2020-11-03
Payer: MEDICARE

## 2020-11-03 VITALS
TEMPERATURE: 97.8 F | BODY MASS INDEX: 37.93 KG/M2 | HEART RATE: 75 BPM | WEIGHT: 206.1 LBS | OXYGEN SATURATION: 96 % | SYSTOLIC BLOOD PRESSURE: 104 MMHG | RESPIRATION RATE: 18 BRPM | HEIGHT: 62 IN | DIASTOLIC BLOOD PRESSURE: 66 MMHG

## 2020-11-03 DIAGNOSIS — E03.4 HYPOTHYROIDISM DUE TO ACQUIRED ATROPHY OF THYROID: ICD-10-CM

## 2020-11-03 DIAGNOSIS — Z12.11 SCREEN FOR COLON CANCER: ICD-10-CM

## 2020-11-03 DIAGNOSIS — Z13.220 LIPID SCREENING: ICD-10-CM

## 2020-11-03 DIAGNOSIS — Z00.00 ENCOUNTER FOR MEDICARE ANNUAL WELLNESS EXAM: Primary | ICD-10-CM

## 2020-11-03 DIAGNOSIS — Z23 ENCOUNTER FOR IMMUNIZATION: ICD-10-CM

## 2020-11-03 DIAGNOSIS — R00.2 PALPITATIONS: ICD-10-CM

## 2020-11-03 DIAGNOSIS — R73.01 IMPAIRED FASTING GLUCOSE: ICD-10-CM

## 2020-11-03 DIAGNOSIS — N18.2 CKD (CHRONIC KIDNEY DISEASE) STAGE 2, GFR 60-89 ML/MIN: ICD-10-CM

## 2020-11-03 LAB
ALBUMIN SERPL-MCNC: 3.7 G/DL (ref 3.4–5)
ALP SERPL-CCNC: 44 U/L (ref 40–150)
ALT SERPL W P-5'-P-CCNC: 28 U/L (ref 0–50)
ANION GAP SERPL CALCULATED.3IONS-SCNC: 7 MMOL/L (ref 3–14)
AST SERPL W P-5'-P-CCNC: 16 U/L (ref 0–45)
BASOPHILS # BLD AUTO: 0 10E9/L (ref 0–0.2)
BASOPHILS NFR BLD AUTO: 0.4 %
BILIRUB SERPL-MCNC: 0.5 MG/DL (ref 0.2–1.3)
BUN SERPL-MCNC: 17 MG/DL (ref 7–30)
CALCIUM SERPL-MCNC: 8.6 MG/DL (ref 8.5–10.1)
CHLORIDE SERPL-SCNC: 109 MMOL/L (ref 94–109)
CHOLEST SERPL-MCNC: 185 MG/DL
CO2 SERPL-SCNC: 26 MMOL/L (ref 20–32)
CREAT SERPL-MCNC: 0.66 MG/DL (ref 0.52–1.04)
CREAT UR-MCNC: 157 MG/DL
DIFFERENTIAL METHOD BLD: NORMAL
EOSINOPHIL # BLD AUTO: 0.1 10E9/L (ref 0–0.7)
EOSINOPHIL NFR BLD AUTO: 1.8 %
ERYTHROCYTE [DISTWIDTH] IN BLOOD BY AUTOMATED COUNT: 13.6 % (ref 10–15)
GFR SERPL CREATININE-BSD FRML MDRD: 90 ML/MIN/{1.73_M2}
GLUCOSE SERPL-MCNC: 101 MG/DL (ref 70–99)
HCT VFR BLD AUTO: 43.1 % (ref 35–47)
HDLC SERPL-MCNC: 70 MG/DL
HGB BLD-MCNC: 14.4 G/DL (ref 11.7–15.7)
LDLC SERPL CALC-MCNC: 91 MG/DL
LYMPHOCYTES # BLD AUTO: 1.5 10E9/L (ref 0.8–5.3)
LYMPHOCYTES NFR BLD AUTO: 26.1 %
MCH RBC QN AUTO: 30.4 PG (ref 26.5–33)
MCHC RBC AUTO-ENTMCNC: 33.4 G/DL (ref 31.5–36.5)
MCV RBC AUTO: 91 FL (ref 78–100)
MICROALBUMIN UR-MCNC: 11 MG/L
MICROALBUMIN/CREAT UR: 7.26 MG/G CR (ref 0–25)
MONOCYTES # BLD AUTO: 0.6 10E9/L (ref 0–1.3)
MONOCYTES NFR BLD AUTO: 11.3 %
NEUTROPHILS # BLD AUTO: 3.4 10E9/L (ref 1.6–8.3)
NEUTROPHILS NFR BLD AUTO: 60.4 %
NONHDLC SERPL-MCNC: 115 MG/DL
PLATELET # BLD AUTO: 272 10E9/L (ref 150–450)
POTASSIUM SERPL-SCNC: 4.2 MMOL/L (ref 3.4–5.3)
PROT SERPL-MCNC: 7 G/DL (ref 6.8–8.8)
RBC # BLD AUTO: 4.73 10E12/L (ref 3.8–5.2)
SODIUM SERPL-SCNC: 142 MMOL/L (ref 133–144)
TRIGL SERPL-MCNC: 119 MG/DL
TSH SERPL DL<=0.005 MIU/L-ACNC: 2.66 MU/L (ref 0.4–4)
WBC # BLD AUTO: 5.6 10E9/L (ref 4–11)

## 2020-11-03 PROCEDURE — 90714 TD VACC NO PRESV 7 YRS+ IM: CPT | Performed by: FAMILY MEDICINE

## 2020-11-03 PROCEDURE — 84443 ASSAY THYROID STIM HORMONE: CPT | Performed by: FAMILY MEDICINE

## 2020-11-03 PROCEDURE — G0009 ADMIN PNEUMOCOCCAL VACCINE: HCPCS | Performed by: FAMILY MEDICINE

## 2020-11-03 PROCEDURE — G0439 PPPS, SUBSEQ VISIT: HCPCS | Performed by: FAMILY MEDICINE

## 2020-11-03 PROCEDURE — 36415 COLL VENOUS BLD VENIPUNCTURE: CPT | Performed by: FAMILY MEDICINE

## 2020-11-03 PROCEDURE — 85025 COMPLETE CBC W/AUTO DIFF WBC: CPT | Performed by: FAMILY MEDICINE

## 2020-11-03 PROCEDURE — 82043 UR ALBUMIN QUANTITATIVE: CPT | Performed by: FAMILY MEDICINE

## 2020-11-03 PROCEDURE — 80061 LIPID PANEL: CPT | Performed by: FAMILY MEDICINE

## 2020-11-03 PROCEDURE — 99214 OFFICE O/P EST MOD 30 MIN: CPT | Mod: 25 | Performed by: FAMILY MEDICINE

## 2020-11-03 PROCEDURE — 93000 ELECTROCARDIOGRAM COMPLETE: CPT | Performed by: FAMILY MEDICINE

## 2020-11-03 PROCEDURE — 90732 PPSV23 VACC 2 YRS+ SUBQ/IM: CPT | Performed by: FAMILY MEDICINE

## 2020-11-03 PROCEDURE — 80053 COMPREHEN METABOLIC PANEL: CPT | Performed by: FAMILY MEDICINE

## 2020-11-03 PROCEDURE — 90472 IMMUNIZATION ADMIN EACH ADD: CPT | Performed by: FAMILY MEDICINE

## 2020-11-03 ASSESSMENT — ENCOUNTER SYMPTOMS
JOINT SWELLING: 0
HEARTBURN: 0
EYE PAIN: 0
FREQUENCY: 0
BREAST MASS: 0
CHILLS: 0
FEVER: 0
HEADACHES: 0
PARESTHESIAS: 0
DYSURIA: 0
HEMATOCHEZIA: 0
DIZZINESS: 0
HEMATURIA: 0
PALPITATIONS: 1
ARTHRALGIAS: 0
COUGH: 0
ABDOMINAL PAIN: 0
SHORTNESS OF BREATH: 0
WEAKNESS: 0
NAUSEA: 0
NERVOUS/ANXIOUS: 0
DIARRHEA: 0
SORE THROAT: 0
CONSTIPATION: 0
MYALGIAS: 0

## 2020-11-03 ASSESSMENT — MIFFLIN-ST. JEOR: SCORE: 1413.11

## 2020-11-03 ASSESSMENT — ACTIVITIES OF DAILY LIVING (ADL): CURRENT_FUNCTION: NO ASSISTANCE NEEDED

## 2020-11-03 NOTE — TELEPHONE ENCOUNTER
Called to discuss with patient.     Patient stated her symptoms have been ongoing for awhile. Recently irregular heartbeat episodes have been more frequent and long lasting. Patient's baseline heartbeat is 50-65 BPM. The patient's heartbeat can get up to 150 BPM. At times the patient wakes up in the middle of the night with a pounding racing heart.   Patient denies chest pain, dizziness, sob, feeling faint, or light headed.     PLAN:   Patient needs further evaluation of her symptoms. Patient has an appointment with  on 11/3/20 at 11am today. Keep appointment.     Jordan Thao RN  November 3, 2020

## 2020-11-03 NOTE — PROGRESS NOTES
"SUBJECTIVE:   Patricia Haynes is a 70 year old female who presents for Preventive Visit.      Patient has been advised of split billing requirements and indicates understanding: Yes   Are you in the first 12 months of your Medicare coverage?  No    Healthy Habits:     In general, how would you rate your overall health?  Good    Frequency of exercise:  2-3 days/week    Duration of exercise:  30-45 minutes    Do you usually eat at least 4 servings of fruit and vegetables a day, include whole grains    & fiber and avoid regularly eating high fat or \"junk\" foods?  Yes    Taking medications regularly:  Yes    Medication side effects:  None    Ability to successfully perform activities of daily living:  No assistance needed    Home Safety:  Lack of grab bars in the bathroom    Hearing Impairment:  No hearing concerns    In the past 6 months, have you been bothered by leaking of urine? Yes    In general, how would you rate your overall mental or emotional health?  Excellent      PHQ-2 Total Score: 0    Additional concerns today:  Yes    Do you feel safe in your environment? Yes    Have you ever done Advance Care Planning? (For example, a Health Directive, POLST, or a discussion with a medical provider or your loved ones about your wishes): Yes, patient states has an Advance Care Planning document and will bring a copy to the clinic.      Fall risk  Fallen 2 or more times in the past year?: (P) No  Any fall with injury in the past year?: (P) No    Cognitive Screening   1) Repeat 3 items (Leader, Season, Table)    2) Clock draw: NORMAL  3) 3 item recall: Recalls 3 objects  Results: 3 items recalled: COGNITIVE IMPAIRMENT LESS LIKELY    Mini-CogTM Copyright VIK Patel. Licensed by the author for use in [facility]; reprinted with permission (everette@.[facility]). All rights reserved.      Do you have sleep apnea, excessive snoring or daytime drowsiness?: no    Reviewed and updated as needed this visit by clinical " staff  Tobacco  Allergies  Meds  Problems  Med Hx  Surg Hx  Fam Hx  Soc Hx          Reviewed and updated as needed this visit by Provider  Tobacco  Allergies  Meds  Problems  Med Hx  Surg Hx  Fam Hx         Social History     Tobacco Use     Smoking status: Never Smoker     Smokeless tobacco: Never Used   Substance Use Topics     Alcohol use: Yes     Comment: 3 beers a day          Alcohol Use 11/3/2020   Prescreen: >3 drinks/day or >7 drinks/week? No   Prescreen: >3 drinks/day or >7 drinks/week? -       PALPITATIONS  2-3 times per week. Some episodes of woke her up with her heart racing and sweating. Watch can't read the rhythm. She had this 5 years ago. 2 years ago had a Ziopatch. No concerning findings.   No chest pain. No shortness of breath.   The 2 episodes that have woken her in the middle the night are the most worrisome to her.  She has tried to Valsalva to take herself out of it and unable to do so.  She is worried that she has gotten alerts from her watch inviting her to atrial fibrillation groups.  She has no history of atrial fibrillation in the past.  Has noted the highest pulse at 155. Generally in the 60s.   She can go several days without having any episodes.  She has no episodes of dizziness or lightheadedness.  In the past she has had a stress test which did not reveal any abnormalities.  This was 5 years ago.      URINARY INCONTINENCE  Medications make her mouth dry.  She wonders if this could be causing some palpitations.      Chronic Kidney Disease Follow-up      Do you take any over the counter pain medicine?: No    Hypothyroidism Follow-up      Since last visit, patient describes the following symptoms: Weight stable, no hair loss, no skin changes, no constipation, no loose stools      Current providers sharing in care for this patient include:   Patient Care Team:  Kirsten Molina MD as PCP - General (Family Practice)  Mary Jean APRN CNP as Assigned PCP    The following  health maintenance items are reviewed in Epic and correct as of today:  Health Maintenance   Topic Date Due     Pneumococcal Vaccine: 65+ Years (2 of 2 - PPSV23) 10/05/2017     DTAP/TDAP/TD IMMUNIZATION (3 - Td) 02/01/2020     COLORECTAL CANCER SCREENING  03/11/2020     BMP  04/02/2020     LIPID  04/02/2020     MICROALBUMIN  04/02/2020     FALL RISK ASSESSMENT  04/02/2020     MAMMO SCREENING  04/02/2021     MEDICARE ANNUAL WELLNESS VISIT  11/03/2021     ADVANCE CARE PLANNING  11/03/2025     DEXA  Completed     HEPATITIS C SCREENING  Completed     PHQ-2  Completed     INFLUENZA VACCINE  Completed     ZOSTER IMMUNIZATION  Completed     Pneumococcal Vaccine: Pediatrics (0 to 5 Years) and At-Risk Patients (6 to 64 Years)  Aged Out     IPV IMMUNIZATION  Aged Out     MENINGITIS IMMUNIZATION  Aged Out     HEPATITIS B IMMUNIZATION  Aged Out     BP Readings from Last 3 Encounters:   11/03/20 104/66   04/02/19 122/82   09/26/18 124/74    Wt Readings from Last 3 Encounters:   11/03/20 93.5 kg (206 lb 1.6 oz)   04/02/19 88.9 kg (196 lb)   09/26/18 91.8 kg (202 lb 4.8 oz)                      Review of Systems   Constitutional: Negative for chills and fever.   HENT: Negative for congestion, ear pain, hearing loss and sore throat.    Eyes: Negative for pain and visual disturbance.   Respiratory: Negative for cough and shortness of breath.    Cardiovascular: Positive for palpitations. Negative for chest pain and peripheral edema.   Gastrointestinal: Negative for abdominal pain, constipation, diarrhea, heartburn, hematochezia and nausea.   Breasts:  Negative for tenderness, breast mass and discharge.   Genitourinary: Positive for urgency. Negative for dysuria, frequency, genital sores, hematuria, pelvic pain, vaginal bleeding and vaginal discharge.   Musculoskeletal: Negative for arthralgias, joint swelling and myalgias.   Skin: Negative for rash.   Neurological: Negative for dizziness, weakness, headaches and paresthesias.  "  Psychiatric/Behavioral: Negative for mood changes. The patient is not nervous/anxious.          OBJECTIVE:   /66   Pulse 75   Temp 97.8  F (36.6  C) (Temporal)   Resp 18   Ht 1.575 m (5' 2\")   Wt 93.5 kg (206 lb 1.6 oz)   LMP 10/31/2004 (Exact Date)   SpO2 96%   Breastfeeding No   BMI 37.70 kg/m   Estimated body mass index is 37.7 kg/m  as calculated from the following:    Height as of this encounter: 1.575 m (5' 2\").    Weight as of this encounter: 93.5 kg (206 lb 1.6 oz).  Physical Exam  GENERAL: healthy, alert and no distress  EYES: Eyes grossly normal to inspection, PERRL and conjunctivae and sclerae normal  HENT: ear canals and TM's normal, nose and mouth without ulcers or lesions  NECK: no adenopathy, no asymmetry, masses, or scars and thyroid normal to palpation  RESP: lungs clear to auscultation - no rales, rhonchi or wheezes  CV: regular rate and rhythm, normal S1 S2, no S3 or S4, no murmur, click or rub, no peripheral edema and peripheral pulses strong  ABDOMEN: soft, nontender, no hepatosplenomegaly, no masses and bowel sounds normal  MS: no gross musculoskeletal defects noted, no edema  SKIN: no suspicious lesions or rashes  NEURO: Normal strength and tone, mentation intact and speech normal  PSYCH: mentation appears normal, affect normal/bright    Diagnostic Test Results:  Labs reviewed in Epic    EKG is normal sinus rhythm and unchanged from prior EKG    ASSESSMENT / PLAN:   1. Encounter for Medicare annual wellness exam  See counseling messages    2. Palpitations  Kesha has had palpitations in the past but these feel different to her.  She is concerned about the palpitations especially the ones that are waking her in the middle of the night.  These palpitations are not associated with chest pain or shortness of breath.  No lightheadedness or dizziness.  Recommend checking thyroid control.  We will also check CBC and CMP today.  EKG today is in normal limits.  Awaiting lab results.  " "Will consider Zio patch.  Agrees to plan  - TSH with free T4 reflex  - EKG 12-lead complete w/read - Clinics  - Comprehensive metabolic panel  - Zio Patch Holter Adult Pediatric Greater than 48 hrs; Future  - CBC with platelets and differential    3. Hypothyroidism due to acquired atrophy of thyroid  Check TSH today and will notify results.  Adjust therapy as needed.    4. CKD (chronic kidney disease) stage 2, GFR 60-89 ml/min  Due for check of urine test.  Will notify results.  We will check CMP as well today  - Albumin Random Urine Quantitative with Creat Ratio    5. Lipid screening  Notify results  - Lipid panel reflex to direct LDL Fasting    6. Impaired fasting glucose  We will notify results  - Comprehensive metabolic panel    7. Screen for colon cancer  Due for colonoscopy order placed.    8. Encounter for immunization  TD and pneumococcal 23 to be done today.  -      ADMIN VACCINE, FIRST    Patient has been advised of split billing requirements and indicates understanding: Yes  COUNSELING:  Reviewed preventive health counseling, as reflected in patient instructions       Regular exercise       Healthy diet/nutrition    Estimated body mass index is 37.7 kg/m  as calculated from the following:    Height as of this encounter: 1.575 m (5' 2\").    Weight as of this encounter: 93.5 kg (206 lb 1.6 oz).    Weight management plan: Discussed healthy diet and exercise guidelines    She reports that she has never smoked. She has never used smokeless tobacco.      Appropriate preventive services were discussed with this patient, including applicable screening as appropriate for cardiovascular disease, diabetes, osteopenia/osteoporosis, and glaucoma.  As appropriate for age/gender, discussed screening for colorectal cancer, prostate cancer, breast cancer, and cervical cancer. Checklist reviewing preventive services available has been given to the patient.    Reviewed patients plan of care and provided an AVS. The Basic " Care Plan (routine screening as documented in Health Maintenance) for Patricia meets the Care Plan requirement. This Care Plan has been established and reviewed with the Patient.    Counseling Resources:  ATP IV Guidelines  Pooled Cohorts Equation Calculator  Breast Cancer Risk Calculator  Breast Cancer: Medication to Reduce Risk  FRAX Risk Assessment  ICSI Preventive Guidelines  Dietary Guidelines for Americans, 2010  [employer]'s MyPlate  ASA Prophylaxis  Lung CA Screening    Kirsten Molina MD  [facility]    Identified Health Risks:    Information on urinary incontinence and treatment options given to patient.

## 2020-11-03 NOTE — PATIENT INSTRUCTIONS
Recommend vitamin D 400-1000 international unit(s) per day during the winter months.     Patient Education   Personalized Prevention Plan  You are due for the preventive services outlined below.  Your care team is available to assist you in scheduling these services.  If you have already completed any of these items, please share that information with your care team to update in your medical record.  Health Maintenance Due   Topic Date Due     Discuss Advance Care Planning  08/07/2017     Pneumococcal Vaccine (2 of 2 - PPSV23) 10/05/2017     Diptheria Tetanus Pertussis (DTAP/TDAP/TD) Vaccine (3 - Td) 02/01/2020     Colorectal Cancer Screening  03/11/2020     Basic Metabolic Panel  04/02/2020     Cholesterol Lab  04/02/2020     Kidney Microalbumin Urine Test  04/02/2020     FALL RISK ASSESSMENT  04/02/2020        Patient Education   Personalized Prevention Plan  You are due for the preventive services outlined below.  Your care team is available to assist you in scheduling these services.  If you have already completed any of these items, please share that information with your care team to update in your medical record.  Health Maintenance Due   Topic Date Due     Pneumococcal Vaccine (2 of 2 - PPSV23) 10/05/2017     Diptheria Tetanus Pertussis (DTAP/TDAP/TD) Vaccine (3 - Td) 02/01/2020     Colorectal Cancer Screening  03/11/2020     Basic Metabolic Panel  04/02/2020     Cholesterol Lab  04/02/2020     Kidney Microalbumin Urine Test  04/02/2020     FALL RISK ASSESSMENT  04/02/2020       Urinary Incontinence, Female (Adult)   Urinary incontinence means loss of bladder control. This problem affects many women, especially as they get older. If you have incontinence, you may be embarrassed to ask for help. But know that this problem can be treated.   Types of Incontinence  There are different types of incontinence. Two of the main types are described here. You can have more than one type.     Stress incontinence. With  this type, urine leaks when pressure (stress) is put on the bladder. This may happen when you cough, sneeze, or laugh. Stress incontinence most often occurs because the pelvic floor muscles that support the bladder and urethra are weak. This can happen after pregnancy and vaginal childbirth or a hysterectomy. It can also be due to excess body weight or hormone changes.    Urge incontinence (also called overactive bladder). With this type, a sudden urge to urinate is felt often. This may happen even though there may not be much urine in the bladder. The need to urinate often during the night is common. Urge incontinence most often occurs because of bladder spasms. This may be due to bladder irritation or infection. Damage to bladder nerves or pelvic muscles, constipation, and certain medicines can also lead to urge incontinence.  Treatment depends on the cause. Further evaluation is needed to find the type you have. This will likely include an exam and certain tests. Based on the results, you and your healthcare provider can then plan treatment. Until a diagnosis is made, the home care tips below can help ease symptoms.   Home care    Do pelvic floor muscle exercises, if they are prescribed. The pelvic floor muscles help support the bladder and urethra. Many women find that their symptoms improve when doing special exercises that strengthen these muscles. To do the exercises, contract the muscles you would use to stop your stream of urine. But do this when you re not urinating. Hold for 10 seconds, then relax. Repeat 10 to 20 times in a row, at least 3 times a day. Your healthcare provider may give you other instructions for how to do the exercises and how often.    Keep a bladder diary. This helps track how often and how much you urinate over a set period of time. Bring this diary with you to your next visit with the provider. The information can help your provider learn more about your bladder problem.    Lose  weight, if advised to by your provider. Extra weight puts pressure on the bladder. Your provider can help you create a weight-loss plan that s right for you. This may include exercising more and making certain diet changes.    Don't have foods and drinks that may irritate the bladder. These can include alcohol and caffeinated drinks.    Quit smoking. Smoking and other tobacco use can lead to a long-term (chronic) cough that strains the pelvic floor muscles. Smoking may also damage the bladder and urethra. Talk with your provider about treatments or methods you can use to quit smoking.    If drinking large amounts of fluid makes you have symptoms, you may be advised to limit your fluid intake. You may also be advised to drink most of your fluids during the day and to limit fluids at night.    If you re worried about urine leakage or accidents, you may wear absorbent pads to catch urine. Change the pads often. This helps reduce discomfort. It may also reduce the risk of skin or bladder infections.    Follow-up care  Follow up with your healthcare provider, or as directed. It may take some to find the right treatment for your problem. But healthy lifestyle changes can be made right away. These include such things as exercising on a regular basis, eating a healthy diet, losing weight (if needed), and quitting smoking. Your treatment plan may include special therapies or medicines. Certain procedures or surgery may also be options. Talk about any questions you have with your provider.   When to seek medical advice  Call the healthcare provider right away if any of these occur:    Fever of 100.4 F (38 C) or higher, or as directed by your provider    Bladder pain or fullness    Belly swelling    Nausea or vomiting    Back pain    Weakness, dizziness, or fainting  [employer] last reviewed this educational content on 1/1/2020 2000-2020 [employer]. [address] [place] [address], [place] [address]. All rights  reserved. This information is not intended as a substitute for professional medical care. Always follow your healthcare professional's instructions.

## 2020-11-04 ENCOUNTER — ANCILLARY PROCEDURE (OUTPATIENT)
Dept: CARDIOLOGY | Facility: CLINIC | Age: 70
End: 2020-11-04
Attending: FAMILY MEDICINE
Payer: MEDICARE

## 2020-11-04 DIAGNOSIS — R00.2 PALPITATIONS: ICD-10-CM

## 2020-11-04 PROCEDURE — 0298T LEADLESS EKG MONITOR 3 TO 14 DAYS: CPT | Performed by: INTERNAL MEDICINE

## 2020-11-04 PROCEDURE — 0296T LEADLESS EKG MONITOR 3 TO 14 DAYS: CPT | Performed by: INTERNAL MEDICINE

## 2020-11-04 NOTE — PATIENT INSTRUCTIONS
Patient has been prescribed a ZioPatch holter for 14 days.  Patient was instructed regarding the indication, function, care and prompt return of the ZioPatch holter monitor. The monitor, with S/N [id number],  was placed on the patient with instructions regarding care of the skin, electrodes, and monitor, as well as documentation in the patient diary. Patient demonstrated understanding of this information and agreed to call [employer] with further questions or concerns.

## 2020-12-03 ENCOUNTER — TELEPHONE (OUTPATIENT)
Dept: FAMILY MEDICINE | Facility: CLINIC | Age: 70
End: 2020-12-03

## 2020-12-03 DIAGNOSIS — I48.0 PAROXYSMAL ATRIAL FIBRILLATION (H): Primary | ICD-10-CM

## 2020-12-03 RX ORDER — METOPROLOL SUCCINATE 25 MG/1
12.5 TABLET, EXTENDED RELEASE ORAL DAILY
Qty: 45 TABLET | Refills: 1 | Status: SHIPPED | OUTPATIENT
Start: 2020-12-03 | End: 2021-02-01

## 2020-12-03 NOTE — TELEPHONE ENCOUNTER
TC - Please assist in scheduling Echo at [facility]. Patient states any time or day is fine.     Spoke with Kesha about her Zio patch results. Discussed that Atrial fibrillation was present with rapid rate 23% of the time. She had no prior issues with this. [id number] score of 2 (female, age).     Recommend Eliquis 5 mg twice daily. Also recommend metoprolol XL 12.5 mg daily for rate control.     Referred to cardiology for evaluation/consultation.     She also need Echo scheduled.     All questions invited, asked and answered to the patient's apparent satisfaction.  Patient agrees to plan.

## 2020-12-09 ENCOUNTER — ANCILLARY PROCEDURE (OUTPATIENT)
Dept: CARDIOLOGY | Facility: CLINIC | Age: 70
End: 2020-12-09
Attending: FAMILY MEDICINE
Payer: MEDICARE

## 2020-12-09 DIAGNOSIS — I48.0 PAROXYSMAL ATRIAL FIBRILLATION (H): ICD-10-CM

## 2020-12-09 PROCEDURE — 93306 TTE W/DOPPLER COMPLETE: CPT | Performed by: INTERNAL MEDICINE

## 2020-12-23 ENCOUNTER — ANCILLARY PROCEDURE (OUTPATIENT)
Dept: MAMMOGRAPHY | Facility: CLINIC | Age: 70
End: 2020-12-23
Attending: FAMILY MEDICINE
Payer: MEDICARE

## 2020-12-23 DIAGNOSIS — Z12.31 VISIT FOR SCREENING MAMMOGRAM: ICD-10-CM

## 2020-12-23 PROCEDURE — 77067 SCR MAMMO BI INCL CAD: CPT

## 2020-12-23 PROCEDURE — 77063 BREAST TOMOSYNTHESIS BI: CPT

## 2021-01-26 ENCOUNTER — TELEPHONE (OUTPATIENT)
Dept: CARDIOLOGY | Facility: CLINIC | Age: 71
End: 2021-01-26

## 2021-01-26 NOTE — TELEPHONE ENCOUNTER
Ariel for pt asking her to return my call. Please ask the following      *Have you ever seen a cardiologist?   *If so, who did you see?   *Where did you see them?   *When did you see them?    *Do you have any cardiac records outside of the [facility] system?

## 2021-01-26 NOTE — TELEPHONE ENCOUNTER
[facility] Call Center    Phone Message    May a detailed message be left on voicemail: yes     Reason for Call: Patient called back to discuss the questions below.   *Have you ever seen a cardiologist? yes              *If so, who did you see? A provider with [facility], she doesn't know the name              *Where did you see them? [facility]              *When did you see them? 5 years ago     *Do you have any cardiac records outside of the [facility] system?   no    Please advise. Thank you.    Action Taken: Message routed to:  Adult Clinics: Cardiology p [phone]    Travel Screening: Not Applicable

## 2021-01-27 DIAGNOSIS — E03.4 HYPOTHYROIDISM DUE TO ACQUIRED ATROPHY OF THYROID: ICD-10-CM

## 2021-01-27 RX ORDER — LEVOTHYROXINE SODIUM 25 UG/1
TABLET ORAL
Qty: 90 TABLET | Refills: 1 | Status: SHIPPED | OUTPATIENT
Start: 2021-01-27 | End: 2021-07-23

## 2021-01-27 NOTE — TELEPHONE ENCOUNTER
Prescription approved per [facility] Refill Protocol.    Alka Vega RN

## 2021-01-30 NOTE — PROGRESS NOTES
"[place] [facility]    Referring Provider:  No ref. provider found  Primary Care Provider:   Kirsten Molina  Indication for Consultation:  afib    HPI: Patricia Haynes is a 70 year old female being seen today for evaluation of afib.   The patient's risk factor profile is: (+) HTN, (-) DM, (+) hypercholesterolemia, (-) tobacco use, (-) fam Hx premature CAD.    Patient comes in following recent diagnosis of atrial fibrillation. Patient states that several years ago she visited the ED because of palpitations, but had no evidence of atrial fibrillation. However, over the last few months she has been noticing \"a racing heart\" which can occur with activity, at rest or can even wake her up from sleep. It is not associated with chest pain, shortness of breath, presyncope or syncope. Due to these symptoms she had a zio patch placed on 12/2020 that showed afib burden of 23% and was then started on metoprolol and apixaban.     Since her diagnosis of afib, she continues to have frequent episodes of palpitations. She takes her HR during those times with her smart phone and rates have been in the 170s. Continues to have no associated symptoms with these. Denies fever, chills, nights sweats, sore throat, rhinorrhea, cough, vision changes, chest pain, shortness of breath, abdominal pain, nausea, vomiting, diarrhea, constipation, melena, hematochezia, dysuria, hematuria, PND, orthopnea, presyncope, syncope, focal tingling or weakness.     Patient does not smoke, but drinks 2-4 beers per day. No illicit substance use. Does have family history of HTN (mother), but no CAD, MI, arrhythmias or sudden cardiac death.        PAST MEDICAL HISTORY:  Past Medical History:   Diagnosis Date     Fibroid, uterine early 2000s    not symptomatic     Ganglion cyst     left palm     History of blood transfusion 6/4/85    Excessive bleeding during childbirth     Hypothyroidism due to acquired atrophy of thyroid 1/23/2018     Mild " concentric left ventricular hypertrophy (LVH) 1/24/2018     Postmenopausal 6/23/2011       CURRENT MEDICATIONS:  Current Outpatient Medications   Medication Sig     apixaban ANTICOAGULANT (ELIQUIS) 5 MG tablet Take 1 tablet (5 mg) by mouth 2 times daily     ASPIRIN 81 MG OR TABS ONE DAILY FOR PREVENTION OF STROKE     levothyroxine (SYNTHROID/LEVOTHROID) 25 MCG tablet TAKE ONE TABLET BY MOUTH ONCE DAILY     metoprolol succinate ER (TOPROL-XL) 25 MG 24 hr tablet Take 0.5 tablets (12.5 mg) by mouth daily     Omega-3 Fatty Acids (FISH OIL PO) Take  by mouth.     oxybutynin (DITROPAN) 5 MG tablet Take 1 tablet (5 mg) by mouth daily     No current facility-administered medications for this visit.        PAST SURGICAL HISTORY:  Past Surgical History:   Procedure Laterality Date     C REPAIR CRUCIATE LIGAMENT,KNEE  1996    Left knee     COLONOSCOPY  03/11/10    moderate internal hemorrhoids- 10 yr f/u     HC CORRECT BUNION,DOUBLE OSTEOTOMY  12/08/1998    Left foot bunion removal     HC HYSTEROSCOPY W ENDOMETRIAL BX/POLYPECTOMY W/WO D&C  12/21/2004     HC LAPAROSCOPY, SURGICAL; CHOLECYSTECTOMY  02/14/2005       ALLERGIES  No known drug allergies    FAMILY HX:  Family History   Problem Relation Age of Onset     Cancer Mother         cervical     Genitourinary Problems Mother         kidney disease stage 3     Arthritis Mother      Circulatory Mother         blood thinner; water pill     Lipids Mother      Respiratory Mother         tuberculosis; COPD     Hypertension Mother      Osteoporosis Mother      Other Cancer Mother         cervical     Unknown/Adopted Father      Cardiovascular Maternal Grandfather         heart attack     Heart Disease Maternal Grandfather         heart attack     Unknown/Adopted Paternal Grandmother      Unknown/Adopted Paternal Grandfather      Thyroid Disease Daughter      Diabetes Daughter         type 1     Diabetes Maternal Grandmother      Cerebrovascular Disease Maternal Grandmother       Thyroid Disease Daughter      C.A.D. No family hx of      Alzheimer Disease No family hx of      Blood Disease No family hx of      Eye Disorder No family hx of      Gastrointestinal Disease No family hx of      Musculoskeletal Disorder No family hx of      Neurologic Disorder No family hx of      Breast Cancer No family hx of      Cancer - colorectal No family hx of      Prostate Cancer No family hx of      Asthma No family hx of      Ovarian Cancer No family hx of      Depression/Anxiety No family hx of      Anesthesia Reaction No family hx of      Thyroid Disease No family hx of      Chemical Addiction No family hx of      Known Genetic Syndrome No family hx of        SOCIAL HX:  Social History     Socioeconomic History     Marital status:      Spouse name: Not on file     Number of children: Not on file     Years of education: Not on file     Highest education level: Not on file   Occupational History     Not on file   Social Needs     Financial resource strain: Not on file     Food insecurity     Worry: Not on file     Inability: Not on file     Transportation needs     Medical: Not on file     Non-medical: Not on file   Tobacco Use     Smoking status: Never Smoker     Smokeless tobacco: Never Used   Substance and Sexual Activity     Alcohol use: Yes     Comment: 3 beers a day      Drug use: No     Sexual activity: Yes     Partners: Male     Birth control/protection: Post-menopausal, None, Male Surgical     Comment: spouse vasectomy approx 1987   Lifestyle     Physical activity     Days per week: Not on file     Minutes per session: Not on file     Stress: Not on file   Relationships     Social connections     Talks on phone: Not on file     Gets together: Not on file     Attends Congregational service: Not on file     Active member of club or organization: Not on file     Attends meetings of clubs or organizations: Not on file     Relationship status: Not on file     Intimate partner violence     Fear of  current or ex partner: Not on file     Emotionally abused: Not on file     Physically abused: Not on file     Forced sexual activity: Not on file   Other Topics Concern     Parent/sibling w/ CABG, MI or angioplasty before 65F 55M? No   Social History Narrative     Not on file       ROS:  Constitutional: No fever, chills, or sweats. No weight gain/loss.   HEENT: No visual disturbance, ear ache, epistaxis, sore throat.   Allergies/Immunologic: Negative.   Respiratory: No cough, hemoptysis.   Cardiovascular: As per HPI.   GI: No nausea, vomiting, hematemesis, melena, or hematochezia.   : No urinary frequency, dysuria, or hematuria.   Integument: No rash.   Psychiatric: No anxiety / depression.   Neuro: No speech disturbance, focal sensory or motor deficit.   Endocrinology: No polyuria / polyphagia.   Musculoskeletal: No myalgia.    VITAL SIGNS:  LMP 10/31/2004 (Exact Date)   There is no height or weight on file to calculate BMI.  Wt Readings from Last 2 Encounters:   11/03/20 93.5 kg (206 lb 1.6 oz)   04/02/19 88.9 kg (196 lb)       PHYSICAL EXAM  Patricia Haynes is a 70 year old female.in no acute distress.  HEENT: Eyes Nonicteric.  Neck: JVP 2cm H20.  Carotids +2 bilaterally without bruits.  Lungs: CTA.  Cor: RRR. Normal S1 and S2.  No murmur, rub, or gallop.  PMI in Lf 5th ICS.  Abd: Soft, nontender, nondistended.  NABS.  No pulsatile mass.  Extremities: No C/C/E.  Pulses +3/3 symmetric in upper and lower extremities.  Neuro: Grossly intact.  Psych: A&O x 3.  Skin: No rash.    LABS  Recent Labs   Lab Test 11/03/20  1202 01/19/18  1600   WBC 5.6 6.5   HGB 14.4 14.4   HCT 43.1 41.8    300     Recent Labs   Lab Test 11/03/20  1202 04/02/19  1054    141   POTASSIUM 4.2 4.1   CHLORIDE 109 109   CO2 26 26   * 108*   BUN 17 12   CR 0.66 0.55   FRANCISCO J 8.6 9.5     Recent Labs   Lab Test 11/03/20  1202 04/02/19  1054 12/11/14  0928 12/11/14  0928   CHOL 185 216*   < > 205*   HDL 70 59   < > 74   LDL 91  129*   < > 103   TRIG 119 139   < > 140   CHOLHDLRATIO  --   --   --  2.8   NHDL 115 157*   < >  --     < > = values in this interval not displayed.        EKG:  None    ZIOPATCH: (11/4/20)      ECHO: 12/3/20  Global and regional left ventricular function is normal with an EF of 60-65%.  Right ventricular function, chamber size, wall motion, and thickness are  normal.  Ascending aorta 3.8 cm.  Mild dilatation of the aorta is present.  The inferior vena cava is normal.  No pericardial effusion is present.    CARDIAC MRI: None    CORONARY CTA: None    EXERCISE ECHO  9/4/2015  Normal exercise echo     No angina was elicited. Exercise capacity was normal as were heart rate and  blood pressure response to exercise.     There were no wall motion abnormalities at rest or stress. LVEF darlene from 60  to 70% and LV size decreased    CARDIAC CATH: None    ASSESSMENT AND PLAN:   71 y/o female with PMHx significant for paroxysmal afib (CHADVASC 2) and hypothyroidism comes in to undergo evaluation of her afib.     #Afib (CHADVASC 2)  Elevated CHADVASC due to age and gender. Continues to have multiple episodes of afib which spontaneously terminate, but can last for a few hours. Besides palpitations she has not symptoms associated with these and tolerates them well. Although, these can wake her up when in rapid rates. Indeed, these can go up to 170s. Her echocardiogram showed normal LVEF, but does have diastolic dysfunction grade I with mild LA enlargement. TSH normal.  - Increase metoprolol to 25mg PO every day  - Continue with apixaban   - No need to pursue rhythm control at this time as patient tolerating afib well    #HFpEF (NYHA I, Stage B)   - No indication for diuretics  - Lifestyle modification    #HLD  The 10-year ASCVD risk score ([place] MANNING JrBernadette, et al., 2013) is: 8.3%    Values used to calculate the score:      Age: 70 years      Sex: Female      Is Non- : No      Diabetic: No      Tobacco smoker:  No      Systolic Blood Pressure: 132 mmHg      Is BP treated: No      HDL Cholesterol: 70 mg/dL      Total Cholesterol: 185 mg/dL  - Lifestyle modification for now  - Will add statin if no improvement    Patient evaluated and discussed with Dr. Schroeder.     Sea Briones, [facility]  Cardiology Fellow    FOLLOW UP:  6 months      ATTENDING NOTE:  Patient has been seen and evaluated by me on 2/1/2021. I have reviewed the cardiology consultation.  Pleave refer to it for additonal details.  I have reviewed today's vital signs, medications, labs, and imaging results.  I have reviewed and edited, as necessary, the history, review of systems, physical examination, and assessment and plan.  I have discussed my assessment and plan with the cardiology fellow.  Patricia Haynes is a 70 year old female with risk factor profile (-) HTN, (-) DM, (-) hypercholesterolemia, (-) tobacco use, (-) fam Hx premature CAD, referred to cardiology for evaluation of AF.  She presented to her PCP (11/3/2020) with palpitations awakening her in the middle of the night. The episodes can last from minutes to hours, occurring on a daily basis.  Other than a sense of the arrhytmia occurring, she has no cardiopulmonary symptoms  She had a normal ECG (11/2/20) with RsR' in V1.  She had a Ziopatch (Nov 2020) showing PAF, rate , 23% burden.  There were SVT episodes, max rate 245, max length 13 beats.  ECHO (12/9/20) showed normal LV function and no significant valvular heart disease.  She was COVID-19 positive (Nov 13, 2020).  She has Hx hypothryoidism with TSH 2.66 (Nov 2020).   She is on Toprol 12.5 mg every day and Apixaban 5 mg bid.  She monitors her pulse with docBeat phone and notes she still has HR in 170s.  Increase Toprol XL to 25 mg every day. Exam documented above.  STOP-BANG score = 5.  Recommend sleep study as this may be reversible cause of PAF.  If symptoms start to disrupt patient's quality of life, I would get a Lexiscan to rule out CAD  and assuming it is negative, consider an antiarrhythmic such as Flecainide or Sotalol.  Defer on lipid lowering medication at this time.    Homer Schroeder MD     Cardiovascular Division    CC  Patient Care Team:  Kirsten Molina MD as PCP - General (Family Practice)  Kirsten Molina MD as Assigned PCP

## 2021-02-01 ENCOUNTER — OFFICE VISIT (OUTPATIENT)
Dept: CARDIOLOGY | Facility: CLINIC | Age: 71
End: 2021-02-01
Payer: MEDICARE

## 2021-02-01 ENCOUNTER — MEDICAL CORRESPONDENCE (OUTPATIENT)
Dept: HEALTH INFORMATION MANAGEMENT | Facility: CLINIC | Age: 71
End: 2021-02-01

## 2021-02-01 VITALS
HEART RATE: 64 BPM | OXYGEN SATURATION: 98 % | DIASTOLIC BLOOD PRESSURE: 81 MMHG | WEIGHT: 209.5 LBS | BODY MASS INDEX: 38.32 KG/M2 | SYSTOLIC BLOOD PRESSURE: 132 MMHG

## 2021-02-01 DIAGNOSIS — G47.33 OSA (OBSTRUCTIVE SLEEP APNEA): Primary | ICD-10-CM

## 2021-02-01 DIAGNOSIS — I48.0 PAROXYSMAL ATRIAL FIBRILLATION (H): ICD-10-CM

## 2021-02-01 PROCEDURE — 99204 OFFICE O/P NEW MOD 45 MIN: CPT | Mod: GC | Performed by: INTERNAL MEDICINE

## 2021-02-01 RX ORDER — METOPROLOL SUCCINATE 25 MG/1
25 TABLET, EXTENDED RELEASE ORAL DAILY
Qty: 45 TABLET | Refills: 1 | Status: SHIPPED | OUTPATIENT
Start: 2021-02-01 | End: 2021-04-27

## 2021-02-01 ASSESSMENT — PAIN SCALES - GENERAL: PAINLEVEL: NO PAIN (0)

## 2021-02-01 NOTE — PATIENT INSTRUCTIONS
It was a pleasure to see you in the cardiology clinic today.    If you have any questions, you can reach my nurse, Hetal Horn, at [phone].     Note the new medications: None    Stop the following medications: None    Change the Toprol XL 25 mg from one half tab a day TO one full tab a day.     The results from today include: None    Tests ordered today:   -- Sleep Referral    I would like you to follow up with Dr. Schroeder in one year.    Sincerely,      Homer Schroeder MD     [facility]

## 2021-02-01 NOTE — NURSING NOTE
Patricia Haynes's goals for this visit include:   Chief Complaint   Patient presents with     Consult     afib       She requests these members of her care team be copied on today's visit information: no    PCP: Kirsten Molina    Referring Provider:  No referring provider defined for this encounter.    /81 (BP Location: Left arm, Patient Position: Sitting, Cuff Size: Adult Large)   Pulse 64   Wt 95 kg (209 lb 8 oz)   LMP 10/31/2004 (Exact Date)   SpO2 98%   BMI 38.32 kg/m      Do you need any medication refills at today's visit? No    Nancy Arboleda CMA......February 1, 2021     10:00 AM

## 2021-02-15 DIAGNOSIS — I48.0 PAROXYSMAL ATRIAL FIBRILLATION (H): ICD-10-CM

## 2021-02-16 RX ORDER — APIXABAN 5 MG/1
TABLET, FILM COATED ORAL
Qty: 60 TABLET | Refills: 2 | Status: SHIPPED | OUTPATIENT
Start: 2021-02-16 | End: 2021-05-11

## 2021-02-16 NOTE — TELEPHONE ENCOUNTER
Prescription approved per [facility] Refill Protocol.  Jordan Thao RN, BSN  [place]  February 16, 2021

## 2021-04-01 ENCOUNTER — TELEPHONE (OUTPATIENT)
Dept: FAMILY MEDICINE | Facility: CLINIC | Age: 71
End: 2021-04-01

## 2021-04-01 DIAGNOSIS — Z12.11 SPECIAL SCREENING FOR MALIGNANT NEOPLASMS, COLON: Primary | ICD-10-CM

## 2021-04-01 NOTE — TELEPHONE ENCOUNTER
Patient Quality Outreach Summary      Summary:    Patient is due/failing the following:   Colonoscopy    Type of outreach:    Phone, spoke to patient/parent. call was sent to [place] [facility] to schedule     Questions for provider review:    None                                                                                                                    Sybil Gaines CMA       Chart routed to Care Team.

## 2021-04-26 DIAGNOSIS — I48.0 PAROXYSMAL ATRIAL FIBRILLATION (H): ICD-10-CM

## 2021-04-27 RX ORDER — METOPROLOL SUCCINATE 25 MG/1
25 TABLET, EXTENDED RELEASE ORAL DAILY
Qty: 90 TABLET | Refills: 2 | Status: SHIPPED | OUTPATIENT
Start: 2021-04-27 | End: 2021-06-29 | Stop reason: DRUGHIGH

## 2021-06-08 DIAGNOSIS — Z11.59 ENCOUNTER FOR SCREENING FOR OTHER VIRAL DISEASES: ICD-10-CM

## 2021-06-14 NOTE — PROGRESS NOTES
"     Assessment & Plan     Palpitations  Paroxysmal atrial fibrillation (H)  Patient with episodes of palpitations and increased heart rate that started a couple weeks ago now.  She will have heart rates that go up into the 140s.  She can feel this.  She has no chest pain or shortness of breath or dizziness with these episodes.  She is not any changes with diet or any other changes with activity other than some increased bike riding.  She is not having these episodes with exercise.  EKG showed sinus rhythm at this time.  She remains on her Eliquis 5 mg twice daily.  She remains on her metoprolol XL 25 mg once per day.  Recommend evaluation of CBC, TSH and CMP.  If any abnormalities found will address those to see if this improves the heart rate.  If normal consider increasing metoprolol XL to 50 mg once per day.  Discussed with patient that if she has any chest pain, shortness of breath or other concerns she should be evaluated in the emergency department immediately.  Patient agrees to plan.  - EKG 12-lead complete w/read - Clinics  - TSH with free T4 reflex  - CBC with platelets and differential  - Comprehensive metabolic panel    Hypothyroidism due to acquired atrophy of thyroid  See above.  Recommend recheck of TSH to evaluate control in light of her current palpitation.  - TSH with free T4 reflex             BMI:   Estimated body mass index is 38.15 kg/m  as calculated from the following:    Height as of this encounter: 1.575 m (5' 2\").    Weight as of this encounter: 94.6 kg (208 lb 9.6 oz).   Weight management plan: Discussed healthy diet and exercise guidelines        Return in about 6 months (around 12/15/2021) for annual wellness.    Kirsten Molina MD  [facility] YONI Rebolledo is a 70 year old who presents for the following health issues     History of Present Illness       Vascular Disease:  She presents for follow up of vascular disease.  She never takes nitroglycerin. " "She is not taking daily aspirin.    She eats 4 or more servings of fruits and vegetables daily.She consumes 0 sweetened beverage(s) daily.She exercises with enough effort to increase her heart rate 30 to 60 minutes per day.  She exercises with enough effort to increase her heart rate 4 days per week.   She is taking medications regularly.      On June 4th, 147 after eating and sitting around the fire. States that it didn't last a long time but it was a enough to scare her. These spikes have been happening more often that they usually have been in the past,    Over the last month- states that she has had more of an irregular heartbeat than a regular one. According to her fitbit she is still having normal average resting HB but it shows some big spikes.     She does a lot of biking (is in a bike club) and she is also does a lot of water aerobics. She wants to know that she is okay to continue this.      Did see the cardiologist in 2/2021 before these spikes started happening more often. She states that she has not been having any SOB and chest pain with these spikes. But states that they are scary and make her panic.     Has had this wake her up during the night as well.     She has a fit bit. She sees that her pulse is occasionally up over 100.     Patient has a diagnosis of hypothyroidism.  She has noticed no change in energy level.  She is not noticed any change in weight.  She reports she is taking her levothyroxine daily as prescribed.      Review of Systems   CONSTITUTIONAL: NEGATIVE for fever, chills, change in weight  ENT/MOUTH: NEGATIVE for ear, mouth and throat problems  RESP: NEGATIVE for significant cough or SOB  CV: as above.      Objective    /72   Pulse 64   Temp 98.1  F (36.7  C) (Temporal)   Resp 14   Ht 1.575 m (5' 2\")   Wt 94.6 kg (208 lb 9.6 oz)   LMP 10/31/2004 (Exact Date)   SpO2 97%   BMI 38.15 kg/m    Body mass index is 38.15 kg/m .  Physical Exam   GENERAL: healthy, alert and no " distress  NECK: no adenopathy, no asymmetry, masses, or scars and thyroid normal to palpation  RESP: lungs clear to auscultation - no rales, rhonchi or wheezes  CV: regular rate and rhythm, normal S1 S2, no S3 or S4, no murmur, click or rub, no peripheral edema and peripheral pulses strong  MS: no gross musculoskeletal defects noted, no edema    EKG - Reviewed and interpreted by me appears normal, NSR, normal axis, normal intervals, no acute ST/T changes c/w ischemia, no LVH by voltage criteria, unchanged from previous tracings    Lab work is pending

## 2021-06-15 ENCOUNTER — OFFICE VISIT (OUTPATIENT)
Dept: FAMILY MEDICINE | Facility: CLINIC | Age: 71
End: 2021-06-15
Payer: MEDICARE

## 2021-06-15 VITALS
WEIGHT: 208.6 LBS | OXYGEN SATURATION: 97 % | DIASTOLIC BLOOD PRESSURE: 72 MMHG | TEMPERATURE: 98.1 F | SYSTOLIC BLOOD PRESSURE: 120 MMHG | BODY MASS INDEX: 38.39 KG/M2 | RESPIRATION RATE: 14 BRPM | HEART RATE: 64 BPM | HEIGHT: 62 IN

## 2021-06-15 DIAGNOSIS — R00.2 PALPITATIONS: Primary | ICD-10-CM

## 2021-06-15 DIAGNOSIS — I48.0 PAROXYSMAL ATRIAL FIBRILLATION (H): ICD-10-CM

## 2021-06-15 DIAGNOSIS — E03.4 HYPOTHYROIDISM DUE TO ACQUIRED ATROPHY OF THYROID: ICD-10-CM

## 2021-06-15 LAB
ALBUMIN SERPL-MCNC: 3.8 G/DL (ref 3.4–5)
ALP SERPL-CCNC: 43 U/L (ref 40–150)
ALT SERPL W P-5'-P-CCNC: 29 U/L (ref 0–50)
ANION GAP SERPL CALCULATED.3IONS-SCNC: 3 MMOL/L (ref 3–14)
AST SERPL W P-5'-P-CCNC: 16 U/L (ref 0–45)
BASOPHILS # BLD AUTO: 0 10E9/L (ref 0–0.2)
BASOPHILS NFR BLD AUTO: 0.5 %
BILIRUB SERPL-MCNC: 0.6 MG/DL (ref 0.2–1.3)
BUN SERPL-MCNC: 14 MG/DL (ref 7–30)
CALCIUM SERPL-MCNC: 9 MG/DL (ref 8.5–10.1)
CHLORIDE SERPL-SCNC: 104 MMOL/L (ref 94–109)
CO2 SERPL-SCNC: 29 MMOL/L (ref 20–32)
CREAT SERPL-MCNC: 0.7 MG/DL (ref 0.52–1.04)
DIFFERENTIAL METHOD BLD: NORMAL
EOSINOPHIL # BLD AUTO: 0.1 10E9/L (ref 0–0.7)
EOSINOPHIL NFR BLD AUTO: 1.6 %
ERYTHROCYTE [DISTWIDTH] IN BLOOD BY AUTOMATED COUNT: 13.5 % (ref 10–15)
GFR SERPL CREATININE-BSD FRML MDRD: 88 ML/MIN/{1.73_M2}
GLUCOSE SERPL-MCNC: 116 MG/DL (ref 70–99)
HCT VFR BLD AUTO: 42.9 % (ref 35–47)
HGB BLD-MCNC: 14 G/DL (ref 11.7–15.7)
LYMPHOCYTES # BLD AUTO: 1.6 10E9/L (ref 0.8–5.3)
LYMPHOCYTES NFR BLD AUTO: 27.6 %
MCH RBC QN AUTO: 30.2 PG (ref 26.5–33)
MCHC RBC AUTO-ENTMCNC: 32.6 G/DL (ref 31.5–36.5)
MCV RBC AUTO: 93 FL (ref 78–100)
MONOCYTES # BLD AUTO: 0.5 10E9/L (ref 0–1.3)
MONOCYTES NFR BLD AUTO: 8.9 %
NEUTROPHILS # BLD AUTO: 3.4 10E9/L (ref 1.6–8.3)
NEUTROPHILS NFR BLD AUTO: 61.4 %
PLATELET # BLD AUTO: 271 10E9/L (ref 150–450)
POTASSIUM SERPL-SCNC: 4.2 MMOL/L (ref 3.4–5.3)
PROT SERPL-MCNC: 7.3 G/DL (ref 6.8–8.8)
RBC # BLD AUTO: 4.64 10E12/L (ref 3.8–5.2)
SODIUM SERPL-SCNC: 136 MMOL/L (ref 133–144)
TSH SERPL DL<=0.005 MIU/L-ACNC: 2.83 MU/L (ref 0.4–4)
WBC # BLD AUTO: 5.6 10E9/L (ref 4–11)

## 2021-06-15 PROCEDURE — 99214 OFFICE O/P EST MOD 30 MIN: CPT | Performed by: FAMILY MEDICINE

## 2021-06-15 PROCEDURE — 85025 COMPLETE CBC W/AUTO DIFF WBC: CPT | Performed by: FAMILY MEDICINE

## 2021-06-15 PROCEDURE — 93000 ELECTROCARDIOGRAM COMPLETE: CPT | Performed by: FAMILY MEDICINE

## 2021-06-15 PROCEDURE — 80053 COMPREHEN METABOLIC PANEL: CPT | Performed by: FAMILY MEDICINE

## 2021-06-15 PROCEDURE — 36415 COLL VENOUS BLD VENIPUNCTURE: CPT | Performed by: FAMILY MEDICINE

## 2021-06-15 PROCEDURE — 84443 ASSAY THYROID STIM HORMONE: CPT | Performed by: FAMILY MEDICINE

## 2021-06-15 ASSESSMENT — MIFFLIN-ST. JEOR: SCORE: 1424.45

## 2021-06-15 ASSESSMENT — PAIN SCALES - GENERAL: PAINLEVEL: NO PAIN (0)

## 2021-07-03 DIAGNOSIS — I48.0 PAROXYSMAL ATRIAL FIBRILLATION (H): ICD-10-CM

## 2021-07-04 ENCOUNTER — MYC REFILL (OUTPATIENT)
Dept: FAMILY MEDICINE | Facility: CLINIC | Age: 71
End: 2021-07-04

## 2021-07-04 DIAGNOSIS — I48.0 PAROXYSMAL ATRIAL FIBRILLATION (H): ICD-10-CM

## 2021-07-05 RX ORDER — APIXABAN 5 MG/1
TABLET, FILM COATED ORAL
Qty: 60 TABLET | Refills: 1 | Status: SHIPPED | OUTPATIENT
Start: 2021-07-05 | End: 2021-09-03

## 2021-07-09 ENCOUNTER — MYC MEDICAL ADVICE (OUTPATIENT)
Dept: FAMILY MEDICINE | Facility: CLINIC | Age: 71
End: 2021-07-09

## 2021-07-09 RX ORDER — BISACODYL 5 MG/1
15 TABLET, DELAYED RELEASE ORAL SEE ADMIN INSTRUCTIONS
Qty: 3 TABLET | Refills: 0 | Status: SHIPPED | OUTPATIENT
Start: 2021-07-09 | End: 2021-10-08

## 2021-07-09 ASSESSMENT — MIFFLIN-ST. JEOR: SCORE: 1403.59

## 2021-07-09 NOTE — TELEPHONE ENCOUNTER
Pt. To hold Eliquis 2 days prior to procedure- I.e. Thursday and Friday. -( Per medical resources)  No bridging needed.   Call pt. And Leave for PCP review as FYI.   RAFAEL Ortiz CNP

## 2021-07-13 ENCOUNTER — LAB (OUTPATIENT)
Dept: LAB | Facility: CLINIC | Age: 71
End: 2021-07-13
Payer: MEDICARE

## 2021-07-13 DIAGNOSIS — Z11.59 ENCOUNTER FOR SCREENING FOR OTHER VIRAL DISEASES: ICD-10-CM

## 2021-07-13 PROCEDURE — U0003 INFECTIOUS AGENT DETECTION BY NUCLEIC ACID (DNA OR RNA); SEVERE ACUTE RESPIRATORY SYNDROME CORONAVIRUS 2 (SARS-COV-2) (CORONAVIRUS DISEASE [COVID-19]), AMPLIFIED PROBE TECHNIQUE, MAKING USE OF HIGH THROUGHPUT TECHNOLOGIES AS DESCRIBED BY CMS-2020-01-R: HCPCS

## 2021-07-13 PROCEDURE — U0005 INFEC AGEN DETEC AMPLI PROBE: HCPCS

## 2021-07-14 LAB — SARS-COV-2 RNA RESP QL NAA+PROBE: NEGATIVE

## 2021-07-16 ENCOUNTER — HOSPITAL ENCOUNTER (OUTPATIENT)
Facility: AMBULATORY SURGERY CENTER | Age: 71
Discharge: HOME | End: 2021-07-16
Attending: FAMILY MEDICINE | Admitting: FAMILY MEDICINE
Payer: MEDICARE

## 2021-07-16 VITALS
WEIGHT: 204 LBS | OXYGEN SATURATION: 96 % | TEMPERATURE: 97 F | BODY MASS INDEX: 37.54 KG/M2 | HEART RATE: 57 BPM | SYSTOLIC BLOOD PRESSURE: 95 MMHG | RESPIRATION RATE: 18 BRPM | HEIGHT: 62 IN | DIASTOLIC BLOOD PRESSURE: 75 MMHG

## 2021-07-16 DIAGNOSIS — Z12.11 SCREEN FOR COLON CANCER: Primary | ICD-10-CM

## 2021-07-16 DIAGNOSIS — I48.0 PAROXYSMAL ATRIAL FIBRILLATION (H): ICD-10-CM

## 2021-07-16 LAB — COLONOSCOPY: NORMAL

## 2021-07-16 PROCEDURE — G8918 PT W/O PREOP ORDER IV AB PRO: HCPCS

## 2021-07-16 PROCEDURE — G8907 PT DOC NO EVENTS ON DISCHARG: HCPCS

## 2021-07-16 PROCEDURE — 99152 MOD SED SAME PHYS/QHP 5/>YRS: CPT | Mod: 59 | Performed by: FAMILY MEDICINE

## 2021-07-16 PROCEDURE — 45380 COLONOSCOPY AND BIOPSY: CPT | Mod: PT | Performed by: FAMILY MEDICINE

## 2021-07-16 PROCEDURE — 45380 COLONOSCOPY AND BIOPSY: CPT | Mod: PT

## 2021-07-16 RX ORDER — FENTANYL CITRATE 50 UG/ML
INJECTION, SOLUTION INTRAMUSCULAR; INTRAVENOUS PRN
Status: DISCONTINUED | OUTPATIENT
Start: 2021-07-16 | End: 2021-07-16 | Stop reason: HOSPADM

## 2021-07-16 RX ORDER — ONDANSETRON 2 MG/ML
4 INJECTION INTRAMUSCULAR; INTRAVENOUS
Status: DISCONTINUED | OUTPATIENT
Start: 2021-07-16 | End: 2021-07-17 | Stop reason: HOSPADM

## 2021-07-16 RX ORDER — LIDOCAINE 40 MG/G
CREAM TOPICAL
Status: DISCONTINUED | OUTPATIENT
Start: 2021-07-16 | End: 2021-07-17 | Stop reason: HOSPADM

## 2021-07-20 LAB
PATH REPORT.COMMENTS IMP SPEC: NORMAL
PATH REPORT.COMMENTS IMP SPEC: NORMAL
PATH REPORT.FINAL DX SPEC: NORMAL
PATH REPORT.GROSS SPEC: NORMAL
PATH REPORT.MICROSCOPIC SPEC OTHER STN: NORMAL
PATH REPORT.RELEVANT HX SPEC: NORMAL
PHOTO IMAGE: NORMAL

## 2021-07-20 PROCEDURE — 88305 TISSUE EXAM BY PATHOLOGIST: CPT | Performed by: PATHOLOGY

## 2021-07-22 DIAGNOSIS — E03.4 HYPOTHYROIDISM DUE TO ACQUIRED ATROPHY OF THYROID: ICD-10-CM

## 2021-07-23 RX ORDER — LEVOTHYROXINE SODIUM 25 UG/1
TABLET ORAL
Qty: 90 TABLET | Refills: 1 | Status: SHIPPED | OUTPATIENT
Start: 2021-07-23 | End: 2022-01-20

## 2021-09-03 ENCOUNTER — MYC MEDICAL ADVICE (OUTPATIENT)
Dept: FAMILY MEDICINE | Facility: CLINIC | Age: 71
End: 2021-09-03

## 2021-09-03 DIAGNOSIS — I48.0 PAROXYSMAL ATRIAL FIBRILLATION (H): ICD-10-CM

## 2021-09-03 RX ORDER — APIXABAN 5 MG/1
TABLET, FILM COATED ORAL
Qty: 60 TABLET | Refills: 1 | OUTPATIENT
Start: 2021-09-03

## 2021-09-03 NOTE — TELEPHONE ENCOUNTER
Pending Prescriptions:                       Disp   Refills    ELIQUIS ANTICOAGULANT 5 MG tablet [Pharmac*60 tab*1        Sig: TAKE ONE TABLET BY MOUTH TWICE A DAY    Routing refill request to provider for review/approval because:  Patient requesting to have 90 day supply

## 2021-10-07 ENCOUNTER — MYC MEDICAL ADVICE (OUTPATIENT)
Dept: FAMILY MEDICINE | Facility: CLINIC | Age: 71
End: 2021-10-07

## 2021-10-07 NOTE — TELEPHONE ENCOUNTER
RN Triage    Patient Contact    Attempt # 1    Was call answered?  No.  Left message on voicemail with information to call me back.    [employer] message sent also.  Destiny Bowens RN on 10/7/2021 at 4:27 PM

## 2021-10-08 NOTE — TELEPHONE ENCOUNTER
please review and advise.   Jordan Thao, BSN, RN, PHN  [place]  October 8, 2021

## 2021-10-23 ENCOUNTER — HEALTH MAINTENANCE LETTER (OUTPATIENT)
Age: 71
End: 2021-10-23

## 2021-11-01 DIAGNOSIS — N39.41 URGE INCONTINENCE: ICD-10-CM

## 2021-11-03 RX ORDER — OXYBUTYNIN CHLORIDE 5 MG/1
TABLET ORAL
Qty: 90 TABLET | Refills: 0 | Status: SHIPPED | OUTPATIENT
Start: 2021-11-03 | End: 2022-01-28

## 2021-12-18 ENCOUNTER — HEALTH MAINTENANCE LETTER (OUTPATIENT)
Age: 71
End: 2021-12-18

## 2021-12-18 ASSESSMENT — ENCOUNTER SYMPTOMS
WEAKNESS: 0
PALPITATIONS: 1
EYE PAIN: 0
HEARTBURN: 0
BREAST MASS: 0
HEMATOCHEZIA: 0
ARTHRALGIAS: 0
FEVER: 0
DYSURIA: 0
MYALGIAS: 0
CONSTIPATION: 0
DIARRHEA: 0
DIZZINESS: 1
NERVOUS/ANXIOUS: 0
FREQUENCY: 0
JOINT SWELLING: 0
NAUSEA: 0
PARESTHESIAS: 0
SHORTNESS OF BREATH: 1
HEADACHES: 0
CHILLS: 0
COUGH: 1
HEMATURIA: 0
SORE THROAT: 0
ABDOMINAL PAIN: 0

## 2021-12-18 ASSESSMENT — ACTIVITIES OF DAILY LIVING (ADL): CURRENT_FUNCTION: NO ASSISTANCE NEEDED

## 2021-12-20 NOTE — PROGRESS NOTES
"SUBJECTIVE:   Patricia Haynes is a 71 year old female who presents for Preventive Visit.      Patient has been advised of split billing requirements and indicates understanding: Yes   Are you in the first 12 months of your Medicare coverage?  No    Healthy Habits:     In general, how would you rate your overall health?  Good    Frequency of exercise:  2-3 days/week    Duration of exercise:  30-45 minutes    Do you usually eat at least 4 servings of fruit and vegetables a day, include whole grains    & fiber and avoid regularly eating high fat or \"junk\" foods?  Yes    Taking medications regularly:  Yes    Medication side effects:  Other    Ability to successfully perform activities of daily living:  No assistance needed    Home Safety:  Lack of grab bars in the bathroom    Hearing Impairment:  No hearing concerns    In the past 6 months, have you been bothered by leaking of urine? Yes    In general, how would you rate your overall mental or emotional health?  Excellent      PHQ-2 Total Score: 0    Additional concerns today:  Yes    Do you feel safe in your environment? Yes    Have you ever done Advance Care Planning? (For example, a Health Directive, POLST, or a discussion with a medical provider or your loved ones about your wishes): No, advance care planning information given to patient to review.  Patient plans to discuss their wishes with loved ones or provider.         Fall risk  Fallen 2 or more times in the past year?: No  Any fall with injury in the past year?: No    Cognitive Screening   1) Repeat 3 items (Leader, Season, Table)    2) Clock draw: NORMAL  3) 3 item recall: Recalls 3 objects  Results: 3 items recalled: COGNITIVE IMPAIRMENT LESS LIKELY    Mini-CogTM Copyright VIK Patel. Licensed by the author for use in [facility]; reprinted with permission (everette@.[facility]). All rights reserved.      Do you have sleep apnea, excessive snoring or daytime drowsiness?: no    Reviewed and updated as " needed this visit by clinical staff  Tobacco  Allergies  Meds  Problems  Med Hx  Surg Hx  Fam Hx  Soc Hx         Reviewed and updated as needed this visit by Provider  Tobacco  Allergies  Meds  Problems  Med Hx  Surg Hx  Fam Hx        Social History     Tobacco Use     Smoking status: Never Smoker     Smokeless tobacco: Never Used   Substance Use Topics     Alcohol use: Yes     Comment: 3 beers a day          Alcohol Use 12/18/2021   Prescreen: >3 drinks/day or >7 drinks/week? Yes   Prescreen: >3 drinks/day or >7 drinks/week? -   AUDIT SCORE  6       ATRIAL FIBRILLATION since September has noted afib more when checking blood pressure. No chest pain, shortness of breath.  She has noticed her pulse will go up into the 130s.  Again she does not feel short of breath with that or have any pain.  She does not even feel it is just happens that she will notice that on her blood pressure cuff as it reads her pulse.  She reports is not picking up on her watch.    Hypothyroidism Follow-up      Since last visit, patient describes the following symptoms: Weight stable, no hair loss, no skin changes, no constipation, no loose stools      Current providers sharing in care for this patient include:   Patient Care Team:  Kirsten Molina MD as PCP - General (Family Practice)  Kirsten Molina MD as Assigned PCP  Homer Schroeder MD as Assigned Heart and Vascular Provider    The following health maintenance items are reviewed in Epic and correct as of today:  Health Maintenance Due   Topic Date Due     LIPID  11/03/2021     MICROALBUMIN  11/03/2021     FALL RISK ASSESSMENT  11/03/2021     BP Readings from Last 3 Encounters:   12/21/21 108/66   07/16/21 95/75   06/15/21 120/72    Wt Readings from Last 3 Encounters:   12/21/21 92.6 kg (204 lb 1.6 oz)   07/09/21 92.5 kg (204 lb)   06/15/21 94.6 kg (208 lb 9.6 oz)                  Review of Systems   Constitutional: Negative for chills and fever.   HENT: Negative for  "congestion, ear pain, hearing loss and sore throat.    Eyes: Negative for pain and visual disturbance.   Respiratory: Positive for cough and shortness of breath.    Cardiovascular: Positive for palpitations. Negative for chest pain and peripheral edema.   Gastrointestinal: Negative for abdominal pain, constipation, diarrhea, heartburn, hematochezia and nausea.   Breasts:  Negative for tenderness, breast mass and discharge.   Genitourinary: Positive for urgency. Negative for dysuria, frequency, genital sores, hematuria, pelvic pain, vaginal bleeding and vaginal discharge.   Musculoskeletal: Negative for arthralgias, joint swelling and myalgias.   Skin: Negative for rash.   Neurological: Positive for dizziness. Negative for weakness, headaches and paresthesias.   Psychiatric/Behavioral: Negative for mood changes. The patient is not nervous/anxious.          OBJECTIVE:   /66   Pulse (!) 136   Temp 97.8  F (36.6  C) (Temporal)   Resp 14   Ht 1.575 m (5' 2\")   Wt 92.6 kg (204 lb 1.6 oz)   LMP 10/31/2004 (Exact Date)   SpO2 95%   Breastfeeding No   BMI 37.33 kg/m   Estimated body mass index is 37.33 kg/m  as calculated from the following:    Height as of this encounter: 1.575 m (5' 2\").    Weight as of this encounter: 92.6 kg (204 lb 1.6 oz).  Physical Exam  GENERAL APPEARANCE: healthy, alert and no distress  EYES: Eyes grossly normal to inspection, PERRL and conjunctivae and sclerae normal  HENT: ear canals and TM's normal, nose and mouth without ulcers or lesions, oropharynx clear and oral mucous membranes moist  NECK: no adenopathy, no asymmetry, masses, or scars and thyroid normal to palpation  RESP: lungs clear to auscultation - no rales, rhonchi or wheezes  BREAST: normal without masses, tenderness or nipple discharge and no palpable axillary masses or adenopathy  CV: Irregularly irregular heart rate.  Tachycardic.  No peripheral edema and peripheral pulses strong  ABDOMEN: soft, nontender, no " hepatosplenomegaly, no masses and bowel sounds normal  MS: no musculoskeletal defects are noted and gait is age appropriate without ataxia  SKIN: no suspicious lesions or rashes  NEURO: Normal strength and tone, sensory exam grossly normal, mentation intact and speech normal  PSYCH: mentation appears normal and affect normal/bright        ASSESSMENT / PLAN:   (Z00.00) Encounter for Medicare annual wellness exam  (primary encounter diagnosis)  Comment: See counseling messages  Plan: Recheck in 1 year    (I48.0) Paroxysmal atrial fibrillation (H)  Comment: Patient with atrial fibrillation that known history.  On Eliquis and metoprolol 50 mg daily.  She has noticed some increase in her heart rate more recently.  She remains asymptomatic with it.  She is tachycardic today and irregular.  Continue with Eliquis.  Increase metoprolol XL to 75 mg daily.  Patient was concerned about increasing to the 100 mg.  She will update me if this is not improving the heart rate.  We will check a comprehensive metabolic panel as well as thyroid and CBC to evaluate for any causes for this change.  We will notify results and make any changes necessary based on these results.  Plan: Comprehensive metabolic panel (BMP + Alb, Alk         Phos, ALT, AST, Total. Bili, TP), Albumin         Random Urine Quantitative with Creat Ratio, TSH        with free T4 reflex, CBC with platelets and         differential, metoprolol succinate ER         (TOPROL-XL) 50 MG 24 hr tablet            (E03.4) Hypothyroidism due to acquired atrophy of thyroid  Comment: Awaiting results. Dose adjustment as needed.    Plan: as above    (Z13.220) Lipid screening  Comment: fasting  Plan: Lipid panel reflex to direct LDL Fasting        Will notify of results.     (Z13.1) Screening for diabetes mellitus  Comment: Will notify of results.   Plan: Comprehensive metabolic panel (BMP + Alb, Alk         Phos, ALT, AST, Total. Bili, TP)              Patient has been advised of  "split billing requirements and indicates understanding: Yes  COUNSELING:  Reviewed preventive health counseling, as reflected in patient instructions       Regular exercise       Healthy diet/nutrition    Estimated body mass index is 37.33 kg/m  as calculated from the following:    Height as of this encounter: 1.575 m (5' 2\").    Weight as of this encounter: 92.6 kg (204 lb 1.6 oz).    Weight management plan: Discussed healthy diet and exercise guidelines    She reports that she has never smoked. She has never used smokeless tobacco.      Appropriate preventive services were discussed with this patient, including applicable screening as appropriate for cardiovascular disease, diabetes, osteopenia/osteoporosis, and glaucoma.  As appropriate for age/gender, discussed screening for colorectal cancer, prostate cancer, breast cancer, and cervical cancer. Checklist reviewing preventive services available has been given to the patient.    Reviewed patients plan of care and provided an AVS. The Basic Care Plan (routine screening as documented in Health Maintenance) for Patricia meets the Care Plan requirement. This Care Plan has been established and reviewed with the Patient.    Counseling Resources:  ATP IV Guidelines  Pooled Cohorts Equation Calculator  Breast Cancer Risk Calculator  Breast Cancer: Medication to Reduce Risk  FRAX Risk Assessment  ICSI Preventive Guidelines  Dietary Guidelines for Americans, 2010  [employer] MyPlate  ASA Prophylaxis  Lung CA Screening    Kirsten Molina MD  [facility]    Identified Health Risks:  "

## 2021-12-20 NOTE — PATIENT INSTRUCTIONS
Patient Education   Personalized Prevention Plan  You are due for the preventive services outlined below.  Your care team is available to assist you in scheduling these services.  If you have already completed any of these items, please share that information with your care team to update in your medical record.  Health Maintenance Due   Topic Date Due     COVID-19 Vaccine (3 - Booster for Moderna series) 10/06/2021     Annual Wellness Visit  11/03/2021     Cholesterol Lab  11/03/2021     Kidney Microalbumin Urine Test  11/03/2021     FALL RISK ASSESSMENT  11/03/2021        Patient Education   Personalized Prevention Plan  You are due for the preventive services outlined below.  Your care team is available to assist you in scheduling these services.  If you have already completed any of these items, please share that information with your care team to update in your medical record.  Health Maintenance Due   Topic Date Due     Cholesterol Lab  11/03/2021     Kidney Microalbumin Urine Test  11/03/2021     FALL RISK ASSESSMENT  11/03/2021       Urinary Incontinence, Female (Adult)   Urinary incontinence means loss of bladder control. This problem affects many women, especially as they get older. If you have incontinence, you may be embarrassed to ask for help. But know that this problem can be treated.   Types of Incontinence  There are different types of incontinence. Two of the main types are described here. You can have more than one type.     Stress incontinence. With this type, urine leaks when pressure (stress) is put on the bladder. This may happen when you cough, sneeze, or laugh. Stress incontinence most often occurs because the pelvic floor muscles that support the bladder and urethra are weak. This can happen after pregnancy and vaginal childbirth or a hysterectomy. It can also be due to excess body weight or hormone changes.    Urge incontinence (also called overactive bladder). With this type, a sudden  urge to urinate is felt often. This may happen even though there may not be much urine in the bladder. The need to urinate often during the night is common. Urge incontinence most often occurs because of bladder spasms. This may be due to bladder irritation or infection. Damage to bladder nerves or pelvic muscles, constipation, and certain medicines can also lead to urge incontinence.  Treatment depends on the cause. Further evaluation is needed to find the type you have. This will likely include an exam and certain tests. Based on the results, you and your healthcare provider can then plan treatment. Until a diagnosis is made, the home care tips below can help ease symptoms.   Home care    Do pelvic floor muscle exercises, if they are prescribed. The pelvic floor muscles help support the bladder and urethra. Many women find that their symptoms improve when doing special exercises that strengthen these muscles. To do the exercises, contract the muscles you would use to stop your stream of urine. But do this when you re not urinating. Hold for 10 seconds, then relax. Repeat 10 to 20 times in a row, at least 3 times a day. Your healthcare provider may give you other instructions for how to do the exercises and how often.    Keep a bladder diary. This helps track how often and how much you urinate over a set period of time. Bring this diary with you to your next visit with the provider. The information can help your provider learn more about your bladder problem.    Lose weight, if advised to by your provider. Extra weight puts pressure on the bladder. Your provider can help you create a weight-loss plan that s right for you. This may include exercising more and making certain diet changes.    Don't have foods and drinks that may irritate the bladder. These can include alcohol and caffeinated drinks.    Quit smoking. Smoking and other tobacco use can lead to a long-term (chronic) cough that strains the pelvic floor  muscles. Smoking may also damage the bladder and urethra. Talk with your provider about treatments or methods you can use to quit smoking.    If drinking large amounts of fluid makes you have symptoms, you may be advised to limit your fluid intake. You may also be advised to drink most of your fluids during the day and to limit fluids at night.    If you re worried about urine leakage or accidents, you may wear absorbent pads to catch urine. Change the pads often. This helps reduce discomfort. It may also reduce the risk of skin or bladder infections.    Follow-up care  Follow up with your healthcare provider, or as directed. It may take some to find the right treatment for your problem. But healthy lifestyle changes can be made right away. These include such things as exercising on a regular basis, eating a healthy diet, losing weight (if needed), and quitting smoking. Your treatment plan may include special therapies or medicines. Certain procedures or surgery may also be options. Talk about any questions you have with your provider.   When to seek medical advice  Call the healthcare provider right away if any of these occur:    Fever of 100.4 F (38 C) or higher, or as directed by your provider    Bladder pain or fullness    Belly swelling    Nausea or vomiting    Back pain    Weakness, dizziness, or fainting  [employer] last reviewed this educational content on 1/1/2020 2000-2021 [employer]. All rights reserved. This information is not intended as a substitute for professional medical care. Always follow your healthcare professional's instructions.

## 2021-12-21 ENCOUNTER — OFFICE VISIT (OUTPATIENT)
Dept: FAMILY MEDICINE | Facility: CLINIC | Age: 71
End: 2021-12-21
Payer: MEDICARE

## 2021-12-21 VITALS
TEMPERATURE: 97.8 F | RESPIRATION RATE: 14 BRPM | HEIGHT: 62 IN | HEART RATE: 136 BPM | BODY MASS INDEX: 37.56 KG/M2 | SYSTOLIC BLOOD PRESSURE: 108 MMHG | OXYGEN SATURATION: 95 % | WEIGHT: 204.1 LBS | DIASTOLIC BLOOD PRESSURE: 66 MMHG

## 2021-12-21 DIAGNOSIS — I48.0 PAROXYSMAL ATRIAL FIBRILLATION (H): ICD-10-CM

## 2021-12-21 DIAGNOSIS — E03.4 HYPOTHYROIDISM DUE TO ACQUIRED ATROPHY OF THYROID: ICD-10-CM

## 2021-12-21 DIAGNOSIS — Z13.1 SCREENING FOR DIABETES MELLITUS: ICD-10-CM

## 2021-12-21 DIAGNOSIS — Z00.00 ENCOUNTER FOR MEDICARE ANNUAL WELLNESS EXAM: Primary | ICD-10-CM

## 2021-12-21 DIAGNOSIS — Z13.220 LIPID SCREENING: ICD-10-CM

## 2021-12-21 LAB
ALBUMIN SERPL-MCNC: 3.8 G/DL (ref 3.4–5)
ALP SERPL-CCNC: 50 U/L (ref 40–150)
ALT SERPL W P-5'-P-CCNC: 47 U/L (ref 0–50)
ANION GAP SERPL CALCULATED.3IONS-SCNC: 4 MMOL/L (ref 3–14)
AST SERPL W P-5'-P-CCNC: 17 U/L (ref 0–45)
BASOPHILS # BLD AUTO: 0 10E3/UL (ref 0–0.2)
BASOPHILS NFR BLD AUTO: 1 %
BILIRUB SERPL-MCNC: 0.7 MG/DL (ref 0.2–1.3)
BUN SERPL-MCNC: 12 MG/DL (ref 7–30)
CALCIUM SERPL-MCNC: 9.5 MG/DL (ref 8.5–10.1)
CHLORIDE BLD-SCNC: 103 MMOL/L (ref 94–109)
CHOLEST SERPL-MCNC: 165 MG/DL
CO2 SERPL-SCNC: 30 MMOL/L (ref 20–32)
CREAT SERPL-MCNC: 0.79 MG/DL (ref 0.52–1.04)
CREAT UR-MCNC: 87 MG/DL
EOSINOPHIL # BLD AUTO: 0.1 10E3/UL (ref 0–0.7)
EOSINOPHIL NFR BLD AUTO: 2 %
ERYTHROCYTE [DISTWIDTH] IN BLOOD BY AUTOMATED COUNT: 13.3 % (ref 10–15)
FASTING STATUS PATIENT QL REPORTED: YES
GFR SERPL CREATININE-BSD FRML MDRD: 80 ML/MIN/1.73M2
GLUCOSE BLD-MCNC: 124 MG/DL (ref 70–99)
HCT VFR BLD AUTO: 45.8 % (ref 35–47)
HDLC SERPL-MCNC: 56 MG/DL
HGB BLD-MCNC: 15.3 G/DL (ref 11.7–15.7)
LDLC SERPL CALC-MCNC: 83 MG/DL
LYMPHOCYTES # BLD AUTO: 1.5 10E3/UL (ref 0.8–5.3)
LYMPHOCYTES NFR BLD AUTO: 24 %
MCH RBC QN AUTO: 29.9 PG (ref 26.5–33)
MCHC RBC AUTO-ENTMCNC: 33.4 G/DL (ref 31.5–36.5)
MCV RBC AUTO: 90 FL (ref 78–100)
MICROALBUMIN UR-MCNC: 11 MG/L
MICROALBUMIN/CREAT UR: 12.64 MG/G CR (ref 0–25)
MONOCYTES # BLD AUTO: 0.6 10E3/UL (ref 0–1.3)
MONOCYTES NFR BLD AUTO: 10 %
NEUTROPHILS # BLD AUTO: 4 10E3/UL (ref 1.6–8.3)
NEUTROPHILS NFR BLD AUTO: 64 %
NONHDLC SERPL-MCNC: 109 MG/DL
PLATELET # BLD AUTO: 298 10E3/UL (ref 150–450)
POTASSIUM BLD-SCNC: 4.6 MMOL/L (ref 3.4–5.3)
PROT SERPL-MCNC: 7.3 G/DL (ref 6.8–8.8)
RBC # BLD AUTO: 5.11 10E6/UL (ref 3.8–5.2)
SODIUM SERPL-SCNC: 137 MMOL/L (ref 133–144)
TRIGL SERPL-MCNC: 130 MG/DL
TSH SERPL DL<=0.005 MIU/L-ACNC: 3.4 MU/L (ref 0.4–4)
WBC # BLD AUTO: 6.2 10E3/UL (ref 4–11)

## 2021-12-21 PROCEDURE — 80061 LIPID PANEL: CPT | Performed by: FAMILY MEDICINE

## 2021-12-21 PROCEDURE — 80053 COMPREHEN METABOLIC PANEL: CPT | Performed by: FAMILY MEDICINE

## 2021-12-21 PROCEDURE — G0439 PPPS, SUBSEQ VISIT: HCPCS | Performed by: FAMILY MEDICINE

## 2021-12-21 PROCEDURE — 84443 ASSAY THYROID STIM HORMONE: CPT | Performed by: FAMILY MEDICINE

## 2021-12-21 PROCEDURE — 36415 COLL VENOUS BLD VENIPUNCTURE: CPT | Performed by: FAMILY MEDICINE

## 2021-12-21 PROCEDURE — 85025 COMPLETE CBC W/AUTO DIFF WBC: CPT | Performed by: FAMILY MEDICINE

## 2021-12-21 PROCEDURE — 82043 UR ALBUMIN QUANTITATIVE: CPT | Performed by: FAMILY MEDICINE

## 2021-12-21 PROCEDURE — 99214 OFFICE O/P EST MOD 30 MIN: CPT | Mod: 25 | Performed by: FAMILY MEDICINE

## 2021-12-21 RX ORDER — METOPROLOL SUCCINATE 50 MG/1
75 TABLET, EXTENDED RELEASE ORAL DAILY
Qty: 135 TABLET | Refills: 1 | Status: SHIPPED | OUTPATIENT
Start: 2021-12-21 | End: 2022-05-13

## 2021-12-21 ASSESSMENT — PAIN SCALES - GENERAL: PAINLEVEL: NO PAIN (0)

## 2021-12-21 ASSESSMENT — ENCOUNTER SYMPTOMS
COUGH: 1
FEVER: 0
PALPITATIONS: 1
PARESTHESIAS: 0
CONSTIPATION: 0
SORE THROAT: 0
HEARTBURN: 0
NERVOUS/ANXIOUS: 0
DIARRHEA: 0
ABDOMINAL PAIN: 0
JOINT SWELLING: 0
HEADACHES: 0
HEMATURIA: 0
EYE PAIN: 0
FREQUENCY: 0
MYALGIAS: 0
HEMATOCHEZIA: 0
SHORTNESS OF BREATH: 1
DYSURIA: 0
WEAKNESS: 0
DIZZINESS: 1
CHILLS: 0
BREAST MASS: 0
ARTHRALGIAS: 0
NAUSEA: 0

## 2021-12-21 ASSESSMENT — MIFFLIN-ST. JEOR: SCORE: 1399.04

## 2021-12-21 ASSESSMENT — ACTIVITIES OF DAILY LIVING (ADL): CURRENT_FUNCTION: NO ASSISTANCE NEEDED

## 2022-01-06 ENCOUNTER — OFFICE VISIT (OUTPATIENT)
Dept: FAMILY MEDICINE | Facility: CLINIC | Age: 72
End: 2022-01-06
Payer: MEDICARE

## 2022-01-06 VITALS
TEMPERATURE: 97.9 F | HEIGHT: 62 IN | WEIGHT: 199 LBS | HEART RATE: 74 BPM | DIASTOLIC BLOOD PRESSURE: 70 MMHG | SYSTOLIC BLOOD PRESSURE: 102 MMHG | BODY MASS INDEX: 36.62 KG/M2 | RESPIRATION RATE: 22 BRPM | OXYGEN SATURATION: 96 %

## 2022-01-06 DIAGNOSIS — R73.9 HYPERGLYCEMIA: ICD-10-CM

## 2022-01-06 DIAGNOSIS — Z09 HOSPITAL DISCHARGE FOLLOW-UP: Primary | ICD-10-CM

## 2022-01-06 DIAGNOSIS — I48.0 PAROXYSMAL ATRIAL FIBRILLATION (H): ICD-10-CM

## 2022-01-06 DIAGNOSIS — E03.4 HYPOTHYROIDISM DUE TO ACQUIRED ATROPHY OF THYROID: ICD-10-CM

## 2022-01-06 DIAGNOSIS — E83.42 HYPOMAGNESEMIA: ICD-10-CM

## 2022-01-06 DIAGNOSIS — E66.01 MORBID OBESITY (H): ICD-10-CM

## 2022-01-06 DIAGNOSIS — I50.32 CHRONIC DIASTOLIC CONGESTIVE HEART FAILURE (H): ICD-10-CM

## 2022-01-06 PROBLEM — R73.03 PREDIABETES: Status: ACTIVE | Noted: 2022-01-06

## 2022-01-06 LAB
ANION GAP SERPL CALCULATED.3IONS-SCNC: 5 MMOL/L (ref 3–14)
BUN SERPL-MCNC: 13 MG/DL (ref 7–30)
CALCIUM SERPL-MCNC: 9.4 MG/DL (ref 8.5–10.1)
CHLORIDE BLD-SCNC: 102 MMOL/L (ref 94–109)
CO2 SERPL-SCNC: 31 MMOL/L (ref 20–32)
CREAT SERPL-MCNC: 0.83 MG/DL (ref 0.52–1.04)
GFR SERPL CREATININE-BSD FRML MDRD: 75 ML/MIN/1.73M2
GLUCOSE BLD-MCNC: 93 MG/DL (ref 70–99)
HBA1C MFR BLD: 5.7 % (ref 0–5.6)
MAGNESIUM SERPL-MCNC: 2.2 MG/DL (ref 1.6–2.3)
POTASSIUM BLD-SCNC: 4.4 MMOL/L (ref 3.4–5.3)
SODIUM SERPL-SCNC: 138 MMOL/L (ref 133–144)

## 2022-01-06 PROCEDURE — 83735 ASSAY OF MAGNESIUM: CPT | Performed by: FAMILY MEDICINE

## 2022-01-06 PROCEDURE — 80048 BASIC METABOLIC PNL TOTAL CA: CPT | Performed by: FAMILY MEDICINE

## 2022-01-06 PROCEDURE — 36415 COLL VENOUS BLD VENIPUNCTURE: CPT | Performed by: FAMILY MEDICINE

## 2022-01-06 PROCEDURE — 99214 OFFICE O/P EST MOD 30 MIN: CPT | Performed by: FAMILY MEDICINE

## 2022-01-06 PROCEDURE — 83036 HEMOGLOBIN GLYCOSYLATED A1C: CPT | Performed by: FAMILY MEDICINE

## 2022-01-06 RX ORDER — AMIODARONE HYDROCHLORIDE 200 MG/1
TABLET ORAL
COMMUNITY
Start: 2022-01-02 | End: 2022-01-31

## 2022-01-06 RX ORDER — METOPROLOL SUCCINATE 25 MG/1
75 TABLET, EXTENDED RELEASE ORAL
COMMUNITY
Start: 2022-01-02 | End: 2022-02-02 | Stop reason: DRUGHIGH

## 2022-01-06 RX ORDER — FUROSEMIDE 20 MG
20 TABLET ORAL 2 TIMES DAILY
COMMUNITY
Start: 2022-01-02 | End: 2022-02-02

## 2022-01-06 ASSESSMENT — PAIN SCALES - GENERAL: PAINLEVEL: NO PAIN (0)

## 2022-01-06 ASSESSMENT — MIFFLIN-ST. JEOR: SCORE: 1375.91

## 2022-01-06 NOTE — RESULT ENCOUNTER NOTE
Please inform of results if patient has not viewed in [employer].    Your magnesium was at goal at 2.2. Your other lab results are pending.    Please call the clinic with any questions you may have.     Have a great day,    Dr. Jones

## 2022-01-06 NOTE — PROGRESS NOTES
Assessment & Plan   1. Hospital discharge follow-up: Improved, plan as below.    2. Paroxysmal atrial fibrillation (H): Patient states she has cardioversion scheduled with follow-up echo.  Continues on Eliquis.  Rate controlled with beta-blocker.  Titrating down on her amlodipine as described by her cardiologist.  Follow-up with cardiology.    3. Hypomagnesemia: Goal greater than 2.  1.5 at discharge.  Replace if low.  May prove difficult to replace orally.  - Magnesium; Future    4. Chronic diastolic congestive heart failure (H): Decreased EF likely due to tachycardia.  Follow-up echo plan.  Managed by cardiology.  Currently on beta-blocker.  Has Lasix for diuresis.  If persistent consider low-dose ACE/ARB addition.  - Basic metabolic panel  (Ca, Cl, CO2, Creat, Gluc, K, Na, BUN); Future  - Basic metabolic panel  (Ca, Cl, CO2, Creat, Gluc, K, Na, BUN)    5. Hypothyroidism due to acquired atrophy of thyroid: Slightly abnormal TSH on admission possibly due to acute illness.  Recheck in 2 months.  - TSH with free T4 reflex; Future    6. Hyperglycemia: Slightly elevated blood sugar in the hospital.  Check A1c.  Encourage weight loss.  - Hemoglobin A1c; Future    7. Morbid obesity (H): Encourage weight loss.      Return in about 2 months (around 3/6/2022) for Lab Work.    Andrew Mccollum MD  [facility]    This chart is completed utilizing dictation software; typos and/or incorrect word substitutions may unintentionally occur.       Daphney Rebolledo is a 71 year old who presents for the following health issues  accompanied by her self.    History of Present Illness       She eats 2-3 servings of fruits and vegetables daily.She consumes 0 sweetened beverage(s) daily.She exercises with enough effort to increase her heart rate 30 to 60 minutes per day.  She exercises with enough effort to increase her heart rate 3 or less days per week.   She is taking medications regularly.    "  Hospital Follow-up Visit:    Hospital/Nursing Home/IP Rehab Facility: [facility]  Date of Admission: 12/30/2021  Date of Discharge: 01/02/2022  Reason(s) for Admission:   1. Persistent A. fib with RVR  2. Acute systolic heart failure  3. Cardiomyopathy  4. HTN  5. Hypothyroidism  6. LEE      Was your hospitalization related to COVID-19? No   Problems taking medications regularly:  None  Medication changes since discharge: updated, see new metoprolol dose, patient has both doses at home to finish what she has, but taking 75mg total  Problems adhering to non-medication therapy:  None    Summary of hospitalization:  CareEverywhere information obtained and reviewed  Diagnostic Tests/Treatments reviewed.  Follow up needed: Magnesium, echo, cardioversion  Other Healthcare Providers Involved in Patient s Care:         Specialist appointment - Cardiology  Update since discharge: improved.   Post Discharge Medication Reconciliation: discharge medications reconciled, continue medications without change.  Plan of care communicated with patient       Patient was hospitalized with atrial fibrillation with RVR.  Hospital course listed as below:    \"Patricia Haynes is a 71 y.o. female with history of paroxysmal atrial fibrillation, hypothyroidism on Synthroid presenting with shortness of breath found to have atrial fibrillation with RVR causing acute pulmonary edema.     Typically is very active with a biking group in the summer and does [employer], is a retired health/ fitness   Below is the plan of care she received all she was hospitalized:    Atrial fibrillation with RVR, w/ acute SHF and acute hypoxemic respiratory failure:   Chest x-ray demonstrated pulmonary edema.    Initally had rate controlled on diltiazem infusion after she received several doses of IV metoprolol without improvement in her RVR.    She takes apixaban prior to admission and has been compliant with medication.    Low suspicion " "for bacterial pneumonia as underlying etiology of her symptoms presently  TSH 5.830 on admission  IV diltt dc'd and rec'd amio load and gtt 12/31/21    Cardiology consulted during this admission with recommendations at discharge for the following plan rec's stop the amio drip and started amiodarone oral 400mg BID x 7 days, then 200mg BID x 7 days, then 200mg QD , increased Toprol to 75 mg p.o. twice daily     She is to continue her Eliquis 5 mg twice daily    And she and Dr. Melgar discussed long-term consideration of change to flecainide or sotalol (with ischemic evaluation first) vs PVI which after discussion patient does prefer a PVI.    Echocardiogram completed this admission demonstrated a mildly reduced EF at 40% in regards to her systolic heart failure she changed her Lasix to 20 mg twice daily    Her weight on day of discharge was 92.53 kg (203lb 7oz)    Daily weights and keep a record and bring to clinic appointment    TTE EF is 40% down from her last test a year ago likely due to uncontrolled A. fib     Hyponatremia:   Mild, may be SIADH from atrial fibrillation vs possibly hypotonic hypervolemic in the setting of volume  Slow improvement-appropriate    Slow improvement     Encourage p.o. intake     COVID-19 status:   Fully vaccinated with recent negative test    Covid negative 12/30/2021     Chronic stable medical conditions:   Hypothyroidism, CKD stage II, LVH, urinary incontinence    TSH - wnl    Continue PTA Synthroid for now, low-dose    Continue PTA Ditropan    Baseline creatinine is 0.8, Scr at baseline.\"      Today: feels much better. No shortness of breath, chest pain, etc. On amio 400 mg BID, going down on Sunday. Cardioversion in 2 weeks. Echo after with cardiology.    No side effects or concerns.    Review of Systems   Constitutional, derm, msk, cardiovascular, pulmonary, gi and gu systems are negative, except as otherwise noted.      Objective    /70 (BP Location: Left arm, Patient " "Position: Chair, Cuff Size: Adult Regular)   Pulse 74   Temp 97.9  F (36.6  C) (Temporal)   Resp 22   Ht 1.575 m (5' 2\")   Wt 90.3 kg (199 lb)   LMP 10/31/2004 (Exact Date)   SpO2 96%   Breastfeeding No   BMI 36.40 kg/m    Body mass index is 36.4 kg/m .  Physical Exam   General: Appears well and in no acute distress.  Cardiovascular: Irregular rhythm. No extra heartsounds or friction rub. Radial pulses present and equal bilaterally.  Respiratory: Lungs clear to auscultation bilaterally. No wheezing or crackles. No prolonged expiration. Symmetrical chest rise.  Musculoskeletal: No gross extremity deformities. No peripheral edema. Normal muscle bulk.    Labs: pending        "

## 2022-01-06 NOTE — RESULT ENCOUNTER NOTE
Please inform of results if patient has not viewed in [employer].    Your blood sugar, kidney function, and electrolyte lab results came back normal.    Please call the clinic with any questions you may have.     Have a great day,    Dr. Jones

## 2022-01-06 NOTE — RESULT ENCOUNTER NOTE
I called an left message with results.     Andrew Mccollum MD  [facility]

## 2022-01-17 ENCOUNTER — TELEPHONE (OUTPATIENT)
Dept: CARDIOLOGY | Facility: CLINIC | Age: 72
End: 2022-01-17
Payer: MEDICARE

## 2022-01-17 NOTE — TELEPHONE ENCOUNTER
1/17 provided patient with call back [phone]. 1 year follow up, reschedule with new provider.    Blanca Archibald  Procedure    Cardiology, Rheumatology, GI, Pulmonology, Nephrology Specialties   [facility]  [phone]

## 2022-01-19 DIAGNOSIS — E03.4 HYPOTHYROIDISM DUE TO ACQUIRED ATROPHY OF THYROID: ICD-10-CM

## 2022-01-20 RX ORDER — LEVOTHYROXINE SODIUM 25 UG/1
TABLET ORAL
Qty: 90 TABLET | Refills: 1 | Status: SHIPPED | OUTPATIENT
Start: 2022-01-20 | End: 2022-04-14

## 2022-01-20 NOTE — TELEPHONE ENCOUNTER
Prescription approved per [facility] Refill Protocol.  ALEKSEY GiordanoN, RN, PHN  [place]  January 20, 2022

## 2022-01-28 DIAGNOSIS — N39.41 URGE INCONTINENCE: ICD-10-CM

## 2022-01-28 RX ORDER — OXYBUTYNIN CHLORIDE 5 MG/1
TABLET ORAL
Qty: 90 TABLET | Refills: 1 | Status: SHIPPED | OUTPATIENT
Start: 2022-01-28 | End: 2022-08-12

## 2022-02-01 NOTE — PROGRESS NOTES
Assessment & Plan     Hospital discharge follow-up    Paroxysmal atrial fibrillation (H)  Stable, established with cardiology  - Comprehensive metabolic panel  - Magnesium; Future  - Magnesium    Hypothyroidism due to acquired atrophy of thyroid  Stable, continue present management  - TSH with free T4 reflex; Future  - TSH with free T4 reflex  - T4 free    Muscle spasms of neck  Alternate between ice and heat  - tiZANidine (ZANAFLEX) 2 MG tablet; Take 0.5-1 tablets (1-2 mg) by mouth nightly as needed for muscle spasms  - diclofenac (VOLTAREN) 1 % topical gel; Apply 2 g topically nightly as needed for moderate pain  - Massage Therapy Referral; Future      No follow-ups on file.    Michael Sylvester MD  [facility] YONI Rebolledo is a 71 year old who presents for the following health issues  accompanied by her Self.    History of Present Illness       She eats 4 or more servings of fruits and vegetables daily.She consumes 0 sweetened beverage(s) daily.She exercises with enough effort to increase her heart rate 9 or less minutes per day.  She exercises with enough effort to increase her heart rate 3 or less days per week.   She is taking medications regularly.         Hospital Follow-up Visit:    Hospital/Nursing Home/IP Rehab Facility: [facility]  Date of Admission: 1/26/2022  Date of Discharge: 1/27/2022  Reason(s) for Admission: Atrial fibrillation with rapid ventricular response (HCC)      Was your hospitalization related to COVID-19? No   Problems taking medications regularly:  None  Medication changes since discharge: None  Problems adhering to non-medication therapy:  None    Summary of hospitalization:  [facility] hospital discharge summary reviewed  Diagnostic Tests/Treatments reviewed.  Follow up needed: none  Other Healthcare Providers Involved in Patient s Care:         None  Update since discharge: improved.   Post Discharge Medication Reconciliation:  "discharge medications reconciled, continue medications without change.  Plan of care communicated with patient            Tachybradycardia syndrome  Paroxysmal atrial fibrillation  Hypertension   sick sinus syndrome given significant bradycardia after cardioversion on 2 other occasions.      Surgical Incision Lateral;Left Chest (Active)   Incision Date/Incision Time: 01/26/22 1600 Orientation: Lateral;Left Location: Chest     Pacer/ICD ICD/Pacemaker interrogation status post implantation for Bradycardia and paroxysmal AF, on 1/26/22      Additional concerns;  Neck pain without injury or alarming symptoms radiating to right arm without parasthesia and constipation  Right cubital swelling, ecchymosis possible thrombophlebitis, advised warm compress, ACE wrap applied at clinic.     Review of Systems   Constitutional, HEENT, cardiovascular, pulmonary, GI, , musculoskeletal, neuro, skin, endocrine and psych systems are negative, except as otherwise noted.      Objective    /68   Pulse 97   Temp 98.2  F (36.8  C) (Temporal)   Resp 18   Ht 1.575 m (5' 2\")   Wt 88.9 kg (196 lb)   LMP 10/31/2004 (Exact Date)   SpO2 98%   BMI 35.85 kg/m    Body mass index is 35.85 kg/m .  Physical Exam  Musculoskeletal:      Cervical back: Spasms present. No swelling, edema, deformity, erythema, signs of trauma, lacerations, rigidity, torticollis, tenderness or bony tenderness. No pain with movement. Normal range of motion.        GENERAL: healthy, alert and no distress  EYES: Eyes grossly normal to inspection, PERRL and conjunctivae and sclerae normal  HENT: ear canals and TM's normal, nose and mouth without ulcers or lesions  NECK: no adenopathy, no asymmetry, masses, or scars and thyroid normal to palpation  RESP: lungs clear to auscultation - no rales, rhonchi or wheezes  CV: regular rate and rhythm, normal S1 S2, no S3 or S4, no murmur, click or rub, no peripheral edema and peripheral pulses strong  ABDOMEN: soft, " nontender, no hepatosplenomegaly, no masses and bowel sounds normal      Results for orders placed or performed in visit on 02/02/22   Comprehensive metabolic panel     Status: Abnormal   Result Value Ref Range    Sodium 139 133 - 144 mmol/L    Potassium 4.6 3.4 - 5.3 mmol/L    Chloride 105 94 - 109 mmol/L    Carbon Dioxide (CO2) 30 20 - 32 mmol/L    Anion Gap 4 3 - 14 mmol/L    Urea Nitrogen 11 7 - 30 mg/dL    Creatinine 0.85 0.52 - 1.04 mg/dL    Calcium 9.2 8.5 - 10.1 mg/dL    Glucose 111 (H) 70 - 99 mg/dL    Alkaline Phosphatase 56 40 - 150 U/L    AST 27 0 - 45 U/L    ALT 57 (H) 0 - 50 U/L    Protein Total 7.3 6.8 - 8.8 g/dL    Albumin 3.5 3.4 - 5.0 g/dL    Bilirubin Total 0.4 0.2 - 1.3 mg/dL    GFR Estimate 73 >60 mL/min/1.73m2   Magnesium     Status: Normal   Result Value Ref Range    Magnesium 2.2 1.6 - 2.3 mg/dL   TSH with free T4 reflex     Status: Abnormal   Result Value Ref Range    TSH 4.81 (H) 0.40 - 4.00 mU/L   T4 free     Status: Normal   Result Value Ref Range    Free T4 1.14 0.76 - 1.46 ng/dL

## 2022-02-02 ENCOUNTER — OFFICE VISIT (OUTPATIENT)
Dept: FAMILY MEDICINE | Facility: CLINIC | Age: 72
End: 2022-02-02
Payer: MEDICARE

## 2022-02-02 ENCOUNTER — TELEPHONE (OUTPATIENT)
Dept: FAMILY MEDICINE | Facility: CLINIC | Age: 72
End: 2022-02-02

## 2022-02-02 VITALS
OXYGEN SATURATION: 98 % | RESPIRATION RATE: 18 BRPM | HEIGHT: 62 IN | DIASTOLIC BLOOD PRESSURE: 68 MMHG | TEMPERATURE: 98.2 F | HEART RATE: 97 BPM | BODY MASS INDEX: 36.07 KG/M2 | WEIGHT: 196 LBS | SYSTOLIC BLOOD PRESSURE: 118 MMHG

## 2022-02-02 DIAGNOSIS — E03.4 HYPOTHYROIDISM DUE TO ACQUIRED ATROPHY OF THYROID: ICD-10-CM

## 2022-02-02 DIAGNOSIS — I48.0 PAROXYSMAL ATRIAL FIBRILLATION (H): ICD-10-CM

## 2022-02-02 DIAGNOSIS — Z09 HOSPITAL DISCHARGE FOLLOW-UP: Primary | ICD-10-CM

## 2022-02-02 DIAGNOSIS — M62.838 MUSCLE SPASMS OF NECK: ICD-10-CM

## 2022-02-02 LAB
ALBUMIN SERPL-MCNC: 3.5 G/DL (ref 3.4–5)
ALP SERPL-CCNC: 56 U/L (ref 40–150)
ALT SERPL W P-5'-P-CCNC: 57 U/L (ref 0–50)
ANION GAP SERPL CALCULATED.3IONS-SCNC: 4 MMOL/L (ref 3–14)
AST SERPL W P-5'-P-CCNC: 27 U/L (ref 0–45)
BILIRUB SERPL-MCNC: 0.4 MG/DL (ref 0.2–1.3)
BUN SERPL-MCNC: 11 MG/DL (ref 7–30)
CALCIUM SERPL-MCNC: 9.2 MG/DL (ref 8.5–10.1)
CHLORIDE BLD-SCNC: 105 MMOL/L (ref 94–109)
CO2 SERPL-SCNC: 30 MMOL/L (ref 20–32)
CREAT SERPL-MCNC: 0.85 MG/DL (ref 0.52–1.04)
GFR SERPL CREATININE-BSD FRML MDRD: 73 ML/MIN/1.73M2
GLUCOSE BLD-MCNC: 111 MG/DL (ref 70–99)
MAGNESIUM SERPL-MCNC: 2.2 MG/DL (ref 1.6–2.3)
POTASSIUM BLD-SCNC: 4.6 MMOL/L (ref 3.4–5.3)
PROT SERPL-MCNC: 7.3 G/DL (ref 6.8–8.8)
SODIUM SERPL-SCNC: 139 MMOL/L (ref 133–144)
T4 FREE SERPL-MCNC: 1.14 NG/DL (ref 0.76–1.46)
TSH SERPL DL<=0.005 MIU/L-ACNC: 4.81 MU/L (ref 0.4–4)

## 2022-02-02 PROCEDURE — 83735 ASSAY OF MAGNESIUM: CPT | Performed by: FAMILY MEDICINE

## 2022-02-02 PROCEDURE — 36415 COLL VENOUS BLD VENIPUNCTURE: CPT | Performed by: FAMILY MEDICINE

## 2022-02-02 PROCEDURE — 84443 ASSAY THYROID STIM HORMONE: CPT | Performed by: FAMILY MEDICINE

## 2022-02-02 PROCEDURE — 99214 OFFICE O/P EST MOD 30 MIN: CPT | Performed by: FAMILY MEDICINE

## 2022-02-02 PROCEDURE — 80053 COMPREHEN METABOLIC PANEL: CPT | Performed by: FAMILY MEDICINE

## 2022-02-02 PROCEDURE — 84439 ASSAY OF FREE THYROXINE: CPT | Performed by: FAMILY MEDICINE

## 2022-02-02 RX ORDER — TIZANIDINE 2 MG/1
1-2 TABLET ORAL
Qty: 20 TABLET | Refills: 0 | Status: SHIPPED | OUTPATIENT
Start: 2022-02-02 | End: 2022-05-13

## 2022-02-02 RX ORDER — AMIODARONE HYDROCHLORIDE 200 MG/1
50 TABLET ORAL 2 TIMES DAILY
COMMUNITY
Start: 2022-01-27 | End: 2022-05-13

## 2022-02-02 ASSESSMENT — MIFFLIN-ST. JEOR: SCORE: 1362.3

## 2022-02-02 ASSESSMENT — PAIN SCALES - GENERAL: PAINLEVEL: MODERATE PAIN (4)

## 2022-02-02 NOTE — TELEPHONE ENCOUNTER
Prior Authorization Retail Medication Request    Medication/Dose: diclofenac (VOLTAREN) 1 % topical gel  ICD code (if different than what is on RX):    Previously Tried and Failed:    Rationale:      Insurance Name:    Insurance ID:        Pharmacy Information (if different than what is on RX)  Name:    Phone:

## 2022-02-03 DIAGNOSIS — Z51.81 ENCOUNTER FOR THERAPEUTIC DRUG MONITORING: ICD-10-CM

## 2022-02-03 DIAGNOSIS — E03.4 HYPOTHYROIDISM DUE TO ACQUIRED ATROPHY OF THYROID: Primary | ICD-10-CM

## 2022-02-08 NOTE — TELEPHONE ENCOUNTER
Central Prior Authorization Team   Phone: [phone]    PA Initiation    Medication: diclofenac (VOLTAREN) 1 % topical gel  Insurance Company: [employer] - Phone [phone] Fax [phone]  Pharmacy Filling the Rx: [id number] - [place], [place] - [address]  Filling Pharmacy Phone: [phone]  Filling Pharmacy Fax:    Start Date: 2/8/2022

## 2022-02-09 NOTE — TELEPHONE ENCOUNTER
PRIOR AUTHORIZATION DENIED    Medication: diclofenac (VOLTAREN) 1 % topical gel    Denial Date: 2/9/2022    Denial Rational:              Appeal Information:    If you would like to appeal, please supply P/A team with a letter of medical necessity with clinical reason.

## 2022-03-07 DIAGNOSIS — I48.0 PAROXYSMAL ATRIAL FIBRILLATION (H): ICD-10-CM

## 2022-03-08 NOTE — TELEPHONE ENCOUNTER
Pending Prescriptions:                       Disp   Refills    ELIQUIS ANTICOAGULANT 5 MG tablet [Pharmac*180 ta*2        Sig: TAKE ONE TABLET BY MOUTH TWICE A DAY    Routing refill request to provider for review/approval because:  Drug interaction warning      Requested Prescriptions   Pending Prescriptions Disp Refills    ELIQUIS ANTICOAGULANT 5 MG tablet [Pharmacy Med Name: ELIQUIS 5MG TABS] 180 tablet 2     Sig: TAKE ONE TABLET BY MOUTH TWICE A DAY        Direct Oral Anticoagulant Agents Passed - 3/7/2022 10:49 AM        Passed - Normal Platelets on file in past 12 months       Recent Labs   Lab Test 12/21/21  1032                  Passed - Medication is active on med list        Passed - Patient is 18-79 years of age        Passed - Serum creatinine less than or equal to 1.4 on file in past 12 mos     Recent Labs   Lab Test 02/02/22  1049   CR 0.85       Ok to refill medication if creatinine is low          Passed - Weight is greater than 60 kg for the past year       Wt Readings from Last 3 Encounters:   02/02/22 88.9 kg (196 lb)   01/06/22 90.3 kg (199 lb)   12/21/21 92.6 kg (204 lb 1.6 oz)             Passed - No active pregnancy on record        Passed - No positive pregnancy test within past 12 months        Passed - Recent (6 mo) or future (30 days) visit within the authorizing provider's specialty

## 2022-03-09 RX ORDER — APIXABAN 5 MG/1
TABLET, FILM COATED ORAL
Qty: 180 TABLET | Refills: 2 | Status: SHIPPED | OUTPATIENT
Start: 2022-03-09 | End: 2022-12-05

## 2022-04-12 ENCOUNTER — TELEPHONE (OUTPATIENT)
Dept: FAMILY MEDICINE | Facility: CLINIC | Age: 72
End: 2022-04-12
Payer: MEDICARE

## 2022-04-12 NOTE — CONFIDENTIAL NOTE
Reason for Call:  Form, our goal is to have forms completed with 72 hours, however, some forms may require a visit or additional information.    Type of letter, form or note:  CPAP SUPPLIES    Who is the form from?: [facility] (if other please explain)    Where did the form come from: form was faxed in    What clinic location was the form placed at?: [facility] [phone]    Where the form was placed: [facility] Box/Folder    What number is listed as a contact on the form?: [phone]       Additional comments: Fax to: [phone]    Call taken on 4/12/2022 at 11:38 AM by Jovana Flowers

## 2022-04-13 ENCOUNTER — DOCUMENTATION ONLY (OUTPATIENT)
Dept: LAB | Facility: CLINIC | Age: 72
End: 2022-04-13
Payer: MEDICARE

## 2022-04-13 DIAGNOSIS — R73.03 PREDIABETES: Primary | ICD-10-CM

## 2022-04-13 NOTE — PROGRESS NOTES
Lab notes are suggesting an A1C is due. Please order future labs for 4-14 appointment. Thanks, Mignon RIGGST

## 2022-04-14 ENCOUNTER — LAB (OUTPATIENT)
Dept: LAB | Facility: CLINIC | Age: 72
End: 2022-04-14
Payer: MEDICARE

## 2022-04-14 DIAGNOSIS — R73.03 PREDIABETES: ICD-10-CM

## 2022-04-14 DIAGNOSIS — Z51.81 ENCOUNTER FOR THERAPEUTIC DRUG MONITORING: ICD-10-CM

## 2022-04-14 DIAGNOSIS — E03.4 HYPOTHYROIDISM DUE TO ACQUIRED ATROPHY OF THYROID: ICD-10-CM

## 2022-04-14 LAB
ALT SERPL W P-5'-P-CCNC: 58 U/L (ref 0–50)
AST SERPL W P-5'-P-CCNC: 26 U/L (ref 0–45)
HBA1C MFR BLD: 5.7 % (ref 0–5.6)
T4 FREE SERPL-MCNC: 1.1 NG/DL (ref 0.76–1.46)
TSH SERPL DL<=0.005 MIU/L-ACNC: 6.04 MU/L (ref 0.4–4)

## 2022-04-14 PROCEDURE — 36415 COLL VENOUS BLD VENIPUNCTURE: CPT

## 2022-04-14 PROCEDURE — 84443 ASSAY THYROID STIM HORMONE: CPT

## 2022-04-14 PROCEDURE — 84439 ASSAY OF FREE THYROXINE: CPT

## 2022-04-14 PROCEDURE — 83036 HEMOGLOBIN GLYCOSYLATED A1C: CPT

## 2022-04-14 PROCEDURE — 84460 ALANINE AMINO (ALT) (SGPT): CPT

## 2022-04-14 PROCEDURE — 84450 TRANSFERASE (AST) (SGOT): CPT

## 2022-04-14 RX ORDER — LEVOTHYROXINE SODIUM 50 UG/1
50 TABLET ORAL DAILY
Qty: 90 TABLET | Refills: 0 | Status: SHIPPED | OUTPATIENT
Start: 2022-04-14 | End: 2022-05-16

## 2022-04-14 NOTE — RESULT ENCOUNTER NOTE
Please inform of results if patient has not viewed in [employer] within 3 business days.    TSH - Your thyroid lab results were abnormal. I am recommending we increase your dose to 50 mcg daily and recheck this again in 2 months. Please make a lab only visit for this. A new 50 mcg prescription has been sent to your pharmacy for you.    You can take two of your 25 mcg tablets (total of 50 mcg) daily until you run out of your current levothyroxine medication.    Please call the clinic with any questions you may have.     Have a great day,    Dr. Jones

## 2022-05-13 ENCOUNTER — OFFICE VISIT (OUTPATIENT)
Dept: FAMILY MEDICINE | Facility: CLINIC | Age: 72
End: 2022-05-13
Payer: MEDICARE

## 2022-05-13 VITALS
HEART RATE: 66 BPM | OXYGEN SATURATION: 97 % | SYSTOLIC BLOOD PRESSURE: 116 MMHG | TEMPERATURE: 98.7 F | HEIGHT: 62 IN | BODY MASS INDEX: 36.62 KG/M2 | WEIGHT: 199 LBS | DIASTOLIC BLOOD PRESSURE: 68 MMHG

## 2022-05-13 DIAGNOSIS — R10.9 ABDOMINAL WALL PAIN: Primary | ICD-10-CM

## 2022-05-13 DIAGNOSIS — E03.4 HYPOTHYROIDISM DUE TO ACQUIRED ATROPHY OF THYROID: ICD-10-CM

## 2022-05-13 DIAGNOSIS — I48.0 PAROXYSMAL ATRIAL FIBRILLATION (H): ICD-10-CM

## 2022-05-13 LAB
T4 FREE SERPL-MCNC: 1.27 NG/DL (ref 0.76–1.46)
TSH SERPL DL<=0.005 MIU/L-ACNC: 4.1 MU/L (ref 0.4–4)

## 2022-05-13 PROCEDURE — 84439 ASSAY OF FREE THYROXINE: CPT | Performed by: FAMILY MEDICINE

## 2022-05-13 PROCEDURE — 36415 COLL VENOUS BLD VENIPUNCTURE: CPT | Performed by: FAMILY MEDICINE

## 2022-05-13 PROCEDURE — 99214 OFFICE O/P EST MOD 30 MIN: CPT | Performed by: FAMILY MEDICINE

## 2022-05-13 PROCEDURE — 84443 ASSAY THYROID STIM HORMONE: CPT | Performed by: FAMILY MEDICINE

## 2022-05-13 RX ORDER — AMIODARONE HYDROCHLORIDE 200 MG/1
200 TABLET ORAL DAILY
COMMUNITY
Start: 2022-05-13 | End: 2022-05-13

## 2022-05-13 RX ORDER — METOPROLOL SUCCINATE 100 MG/1
100 TABLET, EXTENDED RELEASE ORAL 2 TIMES DAILY
COMMUNITY
Start: 2022-05-13 | End: 2022-05-26

## 2022-05-13 RX ORDER — METOPROLOL SUCCINATE 50 MG/1
100 TABLET, EXTENDED RELEASE ORAL
COMMUNITY
Start: 2022-02-18 | End: 2022-05-13

## 2022-05-13 RX ORDER — AMIODARONE HYDROCHLORIDE 200 MG/1
200 TABLET ORAL DAILY
COMMUNITY
Start: 2022-05-13 | End: 2023-02-24

## 2022-05-13 ASSESSMENT — PAIN SCALES - GENERAL: PAINLEVEL: NO PAIN (0)

## 2022-05-13 NOTE — PROGRESS NOTES
Assessment & Plan   1. Abdominal wall pain: Reassured.  No red flag symptoms.  Worse with mechanical movements.  Worse with tensing of abdominal muscles and palpation to these.  Improved with decreasing muscle tension.  Most likely represents muscular abdominal wall strain.  Recommend active rest and conservative therapies.  If new or worsening symptoms should follow-up for repeat evaluation.  Patient agreeable plan.    2. Hypothyroidism due to acquired atrophy of thyroid: Due for recheck.  Call with results and available.  Titrate as needed.  - TSH with free T4 reflex; Future    3. Paroxysmal atrial fibrillation (H): Courage follow-up with cardiology.      Return in about 2 weeks (around 5/27/2022), or if symptoms worsen or fail to improve.    Andrew Mccollum MD  [facility]    This chart is completed utilizing dictation software; typos and/or incorrect word substitutions may unintentionally occur.      Daphney Rebolledo is a 71 year old who presents for the following health issues  accompanied by her self.    History of Present Illness       Reason for visit:  Stomach pain  Symptom onset:  1-3 days ago  Symptom intensity:  Severe  Symptom progression:  Staying the same  Had these symptoms before:  No  What makes it worse:  Movement  What makes it better:  Sitting still    She eats 2-3 servings of fruits and vegetables daily.She consumes 0 sweetened beverage(s) daily.She exercises with enough effort to increase her heart rate 30 to 60 minutes per day.  She exercises with enough effort to increase her heart rate 5 days per week.   She is taking medications regularly.     Patient reports onset of pain on the right abdomen on Tuesday of this week.  Has not had a bowel movement since that time.  Denies any nausea, hematochezia.  Typically regulates her self with Dulcolax.  She does have history of cholecystectomy.  Denies any fevers or chills.  Pain currently rated as 1 out of  "10.  Worse with bending or twisting motions.  Not changed with eating or bowel movements.    Has been following up with cardiology has recommended with new atrial fibrillation.  EF has improved.    Due for thyroid recheck after increasing from 25 to 50 mcg last month.    Review of Systems   Constitutional, HEENT, lymph, Derm, MSK, cardiovascular, pulmonary, gi and gu systems are negative, except as otherwise noted.      Objective    /68   Pulse 66   Temp 98.7  F (37.1  C) (Temporal)   Ht 1.575 m (5' 2.01\")   Wt 90.3 kg (199 lb)   LMP 10/31/2004 (Exact Date)   SpO2 97%   BMI 36.39 kg/m    Body mass index is 36.39 kg/m .  Physical Exam   General: Appears well and in no acute distress.  Cardiovascular: Regular rate and rhythm, normal S1 and S2 without murmur. No extra heartsounds or friction rub. Radial pulses present and equal bilaterally.  Respiratory: Lungs clear to auscultation bilaterally. No wheezing or crackles. No prolonged expiration. Symmetrical chest rise.  Musculoskeletal:  No gross extremity deformities. No peripheral edema. Normal muscle bulk.   GI/Rectal: Discomfort to palpation of mid right abdominal wall wall tensing abdominal muscles.  Otherwise soft, non-tender abdomen. No hepatosplenomegaly. Normal active bowel sounds.     Labs: pending        "

## 2022-05-16 DIAGNOSIS — E03.4 HYPOTHYROIDISM DUE TO ACQUIRED ATROPHY OF THYROID: Primary | ICD-10-CM

## 2022-05-16 RX ORDER — LEVOTHYROXINE SODIUM 75 UG/1
75 TABLET ORAL DAILY
Qty: 90 TABLET | Refills: 0 | Status: SHIPPED | OUTPATIENT
Start: 2022-05-16 | End: 2022-09-28

## 2022-05-16 NOTE — CONFIDENTIAL NOTE
TSH still slightly elevated.  Increase Synthroid to 75 mcg daily.    Andrew Mccollum MD  [facility]

## 2022-05-16 NOTE — RESULT ENCOUNTER NOTE
Please inform of results if patient has not viewed in [employer] within 3 business days.    Your thyroid lab is still slightly abnormal.  I recommend you increase your medication to 75 mcg daily.  You are currently on 50 mcg.  This has been sent to your pharmacy.  We should recheck this in 1 to 2 months.    Please call the clinic with any questions you may have.     Have a great day,    Dr. Jones

## 2022-08-10 DIAGNOSIS — N39.41 URGE INCONTINENCE: ICD-10-CM

## 2022-08-12 RX ORDER — OXYBUTYNIN CHLORIDE 5 MG/1
TABLET ORAL
Qty: 90 TABLET | Refills: 0 | Status: SHIPPED | OUTPATIENT
Start: 2022-08-12 | End: 2022-11-09

## 2022-09-26 ENCOUNTER — MYC REFILL (OUTPATIENT)
Dept: FAMILY MEDICINE | Facility: CLINIC | Age: 72
End: 2022-09-26

## 2022-09-26 DIAGNOSIS — E03.4 HYPOTHYROIDISM DUE TO ACQUIRED ATROPHY OF THYROID: ICD-10-CM

## 2022-09-27 NOTE — TELEPHONE ENCOUNTER
Pending Prescriptions:                       Disp   Refills    levothyroxine (SYNTHROID/LEVOTHROID) 75 MC*90 tab*0        Sig: Take 1 tablet (75 mcg) by mouth daily    Routing refill request to provider for review/approval because:  Labs out of range:    TSH   Date Value Ref Range Status   05/13/2022 4.10 (H) 0.40 - 4.00 mU/L Final   06/15/2021 2.83 0.40 - 4.00 mU/L Final

## 2022-09-28 RX ORDER — LEVOTHYROXINE SODIUM 75 UG/1
75 TABLET ORAL DAILY
Qty: 30 TABLET | Refills: 0 | Status: SHIPPED | OUTPATIENT
Start: 2022-09-28 | End: 2022-10-19

## 2022-09-28 NOTE — TELEPHONE ENCOUNTER
This is another one that is pended without a pharmacy.    Needs labs for future refills. Eileen given. Schedule lab visit.    Andrew Mccollum MD  [facility]

## 2022-10-09 ENCOUNTER — HEALTH MAINTENANCE LETTER (OUTPATIENT)
Age: 72
End: 2022-10-09

## 2022-10-14 ENCOUNTER — LAB (OUTPATIENT)
Dept: LAB | Facility: CLINIC | Age: 72
End: 2022-10-14
Payer: MEDICARE

## 2022-10-14 DIAGNOSIS — E03.4 HYPOTHYROIDISM DUE TO ACQUIRED ATROPHY OF THYROID: ICD-10-CM

## 2022-10-14 LAB — TSH SERPL DL<=0.005 MIU/L-ACNC: 2.19 MU/L (ref 0.4–4)

## 2022-10-14 PROCEDURE — 36415 COLL VENOUS BLD VENIPUNCTURE: CPT

## 2022-10-14 PROCEDURE — 84443 ASSAY THYROID STIM HORMONE: CPT

## 2022-10-14 NOTE — RESULT ENCOUNTER NOTE
Please inform of results if patient has not viewed in [place] within 3 business days.    TSH - Your thyroid lab results were normal.    Please call the clinic with any questions you may have.     Have a great day,    Dr. Jones

## 2022-10-19 ENCOUNTER — MYC MEDICAL ADVICE (OUTPATIENT)
Dept: FAMILY MEDICINE | Facility: CLINIC | Age: 72
End: 2022-10-19

## 2022-10-19 DIAGNOSIS — E03.4 HYPOTHYROIDISM DUE TO ACQUIRED ATROPHY OF THYROID: ICD-10-CM

## 2022-10-19 RX ORDER — LEVOTHYROXINE SODIUM 75 UG/1
75 TABLET ORAL DAILY
Qty: 90 TABLET | Refills: 0 | Status: SHIPPED | OUTPATIENT
Start: 2022-10-19 | End: 2023-01-26

## 2022-11-09 DIAGNOSIS — N39.41 URGE INCONTINENCE: ICD-10-CM

## 2022-11-10 RX ORDER — OXYBUTYNIN CHLORIDE 5 MG/1
5 TABLET ORAL DAILY
Qty: 90 TABLET | Refills: 0 | Status: SHIPPED | OUTPATIENT
Start: 2022-11-10 | End: 2023-02-10

## 2022-11-23 DIAGNOSIS — I48.0 PAROXYSMAL ATRIAL FIBRILLATION (H): ICD-10-CM

## 2022-11-23 RX ORDER — METOPROLOL SUCCINATE 100 MG/1
TABLET, EXTENDED RELEASE ORAL
Qty: 180 TABLET | Refills: 1 | Status: SHIPPED | OUTPATIENT
Start: 2022-11-23 | End: 2023-05-25

## 2022-12-19 ENCOUNTER — LAB (OUTPATIENT)
Dept: LAB | Facility: CLINIC | Age: 72
End: 2022-12-19
Payer: MEDICARE

## 2022-12-19 DIAGNOSIS — I48.91 ATRIAL FIBRILLATION (H): ICD-10-CM

## 2022-12-19 DIAGNOSIS — I48.91 ATRIAL FIBRILLATION (H): Primary | ICD-10-CM

## 2022-12-19 LAB
ALBUMIN SERPL-MCNC: 3.7 G/DL (ref 3.4–5)
ALP SERPL-CCNC: 53 U/L (ref 40–150)
ALT SERPL W P-5'-P-CCNC: 37 U/L (ref 0–50)
ANION GAP SERPL CALCULATED.3IONS-SCNC: 4 MMOL/L (ref 3–14)
AST SERPL W P-5'-P-CCNC: 24 U/L (ref 0–45)
BILIRUB SERPL-MCNC: 0.5 MG/DL (ref 0.2–1.3)
BUN SERPL-MCNC: 11 MG/DL (ref 7–30)
CALCIUM SERPL-MCNC: 9.5 MG/DL (ref 8.5–10.1)
CHLORIDE BLD-SCNC: 106 MMOL/L (ref 94–109)
CO2 SERPL-SCNC: 30 MMOL/L (ref 20–32)
CREAT SERPL-MCNC: 0.75 MG/DL (ref 0.52–1.04)
GFR SERPL CREATININE-BSD FRML MDRD: 85 ML/MIN/1.73M2
GLUCOSE BLD-MCNC: 121 MG/DL (ref 70–99)
POTASSIUM BLD-SCNC: 4.3 MMOL/L (ref 3.4–5.3)
PROT SERPL-MCNC: 6.9 G/DL (ref 6.8–8.8)
SODIUM SERPL-SCNC: 140 MMOL/L (ref 133–144)
TSH SERPL DL<=0.005 MIU/L-ACNC: 1.66 MU/L (ref 0.4–4)

## 2022-12-19 PROCEDURE — 80053 COMPREHEN METABOLIC PANEL: CPT

## 2022-12-19 PROCEDURE — 84443 ASSAY THYROID STIM HORMONE: CPT

## 2022-12-19 PROCEDURE — 36415 COLL VENOUS BLD VENIPUNCTURE: CPT

## 2023-01-24 DIAGNOSIS — E03.4 HYPOTHYROIDISM DUE TO ACQUIRED ATROPHY OF THYROID: ICD-10-CM

## 2023-01-26 RX ORDER — LEVOTHYROXINE SODIUM 75 UG/1
75 TABLET ORAL DAILY
Qty: 90 TABLET | Refills: 1 | Status: SHIPPED | OUTPATIENT
Start: 2023-01-26 | End: 2023-06-26

## 2023-01-26 NOTE — TELEPHONE ENCOUNTER
Prescription approved per [facility] Refill Protocol.  Destiny Bowens RN on 1/26/2023 at 2:48 PM

## 2023-02-10 DIAGNOSIS — N39.41 URGE INCONTINENCE: ICD-10-CM

## 2023-02-10 RX ORDER — OXYBUTYNIN CHLORIDE 5 MG/1
5 TABLET ORAL DAILY
Qty: 90 TABLET | Refills: 0 | Status: SHIPPED | OUTPATIENT
Start: 2023-02-10 | End: 2023-05-08

## 2023-02-10 NOTE — TELEPHONE ENCOUNTER
Pending Prescriptions:                       Disp   Refills    oxybutynin (DITROPAN) 5 MG tablet         90 tab*0            Sig: Take 1 tablet (5 mg) by mouth daily      Last filled 11/11/2022

## 2023-02-18 ENCOUNTER — HEALTH MAINTENANCE LETTER (OUTPATIENT)
Age: 73
End: 2023-02-18

## 2023-02-19 ASSESSMENT — ENCOUNTER SYMPTOMS
HEMATURIA: 0
PALPITATIONS: 1
SHORTNESS OF BREATH: 0
MYALGIAS: 0
NERVOUS/ANXIOUS: 0
ARTHRALGIAS: 0
DYSURIA: 0
BREAST MASS: 0
HEMATOCHEZIA: 0
SORE THROAT: 0
PARESTHESIAS: 0
EYE PAIN: 0
CHILLS: 0
CONSTIPATION: 1
HEARTBURN: 0
COUGH: 0
FEVER: 0
WEAKNESS: 0
ABDOMINAL PAIN: 0
JOINT SWELLING: 0
HEADACHES: 1
NAUSEA: 0
FREQUENCY: 0
DIZZINESS: 0
DIARRHEA: 0

## 2023-02-19 ASSESSMENT — ACTIVITIES OF DAILY LIVING (ADL): CURRENT_FUNCTION: NO ASSISTANCE NEEDED

## 2023-02-24 ENCOUNTER — OFFICE VISIT (OUTPATIENT)
Dept: FAMILY MEDICINE | Facility: CLINIC | Age: 73
End: 2023-02-24
Payer: MEDICARE

## 2023-02-24 VITALS
BODY MASS INDEX: 37.17 KG/M2 | DIASTOLIC BLOOD PRESSURE: 86 MMHG | TEMPERATURE: 98.2 F | WEIGHT: 202 LBS | HEART RATE: 72 BPM | HEIGHT: 62 IN | SYSTOLIC BLOOD PRESSURE: 137 MMHG | RESPIRATION RATE: 18 BRPM | OXYGEN SATURATION: 96 %

## 2023-02-24 DIAGNOSIS — E66.01 MORBID OBESITY (H): ICD-10-CM

## 2023-02-24 DIAGNOSIS — E03.4 HYPOTHYROIDISM DUE TO ACQUIRED ATROPHY OF THYROID: ICD-10-CM

## 2023-02-24 DIAGNOSIS — I48.0 PAROXYSMAL ATRIAL FIBRILLATION (H): ICD-10-CM

## 2023-02-24 DIAGNOSIS — Z13.220 SCREENING FOR HYPERLIPIDEMIA: ICD-10-CM

## 2023-02-24 DIAGNOSIS — I50.32 CHRONIC DIASTOLIC CONGESTIVE HEART FAILURE (H): ICD-10-CM

## 2023-02-24 DIAGNOSIS — Z12.31 VISIT FOR SCREENING MAMMOGRAM: ICD-10-CM

## 2023-02-24 DIAGNOSIS — D72.819 LEUKOPENIA, UNSPECIFIED TYPE: Primary | ICD-10-CM

## 2023-02-24 DIAGNOSIS — N39.41 URGE INCONTINENCE: ICD-10-CM

## 2023-02-24 DIAGNOSIS — Z00.00 ENCOUNTER FOR MEDICARE ANNUAL WELLNESS EXAM: Primary | ICD-10-CM

## 2023-02-24 LAB
BASOPHILS # BLD AUTO: 0 10E3/UL (ref 0–0.2)
BASOPHILS NFR BLD AUTO: 1 %
CHOLEST SERPL-MCNC: 256 MG/DL
CREAT UR-MCNC: 41 MG/DL
EOSINOPHIL # BLD AUTO: 0.1 10E3/UL (ref 0–0.7)
EOSINOPHIL NFR BLD AUTO: 4 %
ERYTHROCYTE [DISTWIDTH] IN BLOOD BY AUTOMATED COUNT: 12.8 % (ref 10–15)
FASTING STATUS PATIENT QL REPORTED: YES
HCT VFR BLD AUTO: 43.2 % (ref 35–47)
HDLC SERPL-MCNC: 57 MG/DL
HGB BLD-MCNC: 14.8 G/DL (ref 11.7–15.7)
IMM GRANULOCYTES # BLD: 0 10E3/UL
IMM GRANULOCYTES NFR BLD: 1 %
LDLC SERPL CALC-MCNC: 167 MG/DL
LYMPHOCYTES # BLD AUTO: 0.7 10E3/UL (ref 0.8–5.3)
LYMPHOCYTES NFR BLD AUTO: 24 %
MCH RBC QN AUTO: 30.3 PG (ref 26.5–33)
MCHC RBC AUTO-ENTMCNC: 34.3 G/DL (ref 31.5–36.5)
MCV RBC AUTO: 88 FL (ref 78–100)
MICROALBUMIN UR-MCNC: <5 MG/L
MICROALBUMIN/CREAT UR: NORMAL MG/G{CREAT}
MONOCYTES # BLD AUTO: 0.5 10E3/UL (ref 0–1.3)
MONOCYTES NFR BLD AUTO: 19 %
NEUTROPHILS # BLD AUTO: 1.5 10E3/UL (ref 1.6–8.3)
NEUTROPHILS NFR BLD AUTO: 53 %
NONHDLC SERPL-MCNC: 199 MG/DL
PLATELET # BLD AUTO: 205 10E3/UL (ref 150–450)
RBC # BLD AUTO: 4.89 10E6/UL (ref 3.8–5.2)
TRIGL SERPL-MCNC: 160 MG/DL
TSH SERPL DL<=0.005 MIU/L-ACNC: 1.35 MU/L (ref 0.4–4)
WBC # BLD AUTO: 2.7 10E3/UL (ref 4–11)
WBC # BLD AUTO: 2.8 10E3/UL (ref 4–11)

## 2023-02-24 PROCEDURE — G0439 PPPS, SUBSEQ VISIT: HCPCS | Performed by: FAMILY MEDICINE

## 2023-02-24 PROCEDURE — 82043 UR ALBUMIN QUANTITATIVE: CPT | Performed by: FAMILY MEDICINE

## 2023-02-24 PROCEDURE — 84443 ASSAY THYROID STIM HORMONE: CPT | Performed by: FAMILY MEDICINE

## 2023-02-24 PROCEDURE — 85025 COMPLETE CBC W/AUTO DIFF WBC: CPT | Performed by: FAMILY MEDICINE

## 2023-02-24 PROCEDURE — 36415 COLL VENOUS BLD VENIPUNCTURE: CPT | Performed by: FAMILY MEDICINE

## 2023-02-24 PROCEDURE — 80061 LIPID PANEL: CPT | Performed by: FAMILY MEDICINE

## 2023-02-24 PROCEDURE — 82570 ASSAY OF URINE CREATININE: CPT | Performed by: FAMILY MEDICINE

## 2023-02-24 RX ORDER — OXYBUTYNIN CHLORIDE 5 MG/1
5 TABLET ORAL DAILY
Qty: 90 TABLET | Refills: 0 | Status: CANCELLED | OUTPATIENT
Start: 2023-02-24

## 2023-02-24 RX ORDER — METOPROLOL SUCCINATE 100 MG/1
100 TABLET, EXTENDED RELEASE ORAL 2 TIMES DAILY
Qty: 180 TABLET | Refills: 1 | Status: CANCELLED | OUTPATIENT
Start: 2023-02-24

## 2023-02-24 RX ORDER — SOTALOL HYDROCHLORIDE 80 MG/1
120 TABLET ORAL 2 TIMES DAILY
COMMUNITY
Start: 2023-01-10 | End: 2024-02-14

## 2023-02-24 RX ORDER — LEVOTHYROXINE SODIUM 75 UG/1
75 TABLET ORAL DAILY
Qty: 90 TABLET | Refills: 1 | Status: CANCELLED | OUTPATIENT
Start: 2023-02-24

## 2023-02-24 ASSESSMENT — ENCOUNTER SYMPTOMS
HEMATURIA: 0
HEADACHES: 1
FEVER: 0
DYSURIA: 0
HEMATOCHEZIA: 0
PALPITATIONS: 1
HEARTBURN: 0
WEAKNESS: 0
JOINT SWELLING: 0
EYE PAIN: 0
BREAST MASS: 0
SORE THROAT: 0
PARESTHESIAS: 0
DIZZINESS: 0
FREQUENCY: 0
COUGH: 0
ARTHRALGIAS: 0
CHILLS: 0
CONSTIPATION: 1
SHORTNESS OF BREATH: 0
MYALGIAS: 0
NAUSEA: 0
DIARRHEA: 0
ABDOMINAL PAIN: 0
NERVOUS/ANXIOUS: 0

## 2023-02-24 ASSESSMENT — ACTIVITIES OF DAILY LIVING (ADL): CURRENT_FUNCTION: NO ASSISTANCE NEEDED

## 2023-02-24 ASSESSMENT — PAIN SCALES - GENERAL: PAINLEVEL: NO PAIN (0)

## 2023-02-24 NOTE — PROGRESS NOTES
"SUBJECTIVE:   Kesha is a 72 year old who presents for Preventive Visit.    Patient has been advised of split billing requirements and indicates understanding: Yes  Are you in the first 12 months of your Medicare coverage?  No    Healthy Habits:     In general, how would you rate your overall health?  Good    Frequency of exercise:  None    Do you usually eat at least 4 servings of fruit and vegetables a day, include whole grains    & fiber and avoid regularly eating high fat or \"junk\" foods?  No    Taking medications regularly:  Yes    Medication side effects:  Other    Ability to successfully perform activities of daily living:  No assistance needed    Home Safety:  Lack of grab bars in the bathroom    Hearing Impairment:  No hearing concerns    In the past 6 months, have you been bothered by leaking of urine?  No    In general, how would you rate your overall mental or emotional health?  Good      PHQ-2 Total Score: 0    Additional concerns today:  Yes    Exercises (bikes)    No side effects from medications. Follows with cardiology for atrial fib/chf. Switched from amiodarone to sotalol.    Blood pressures at home controlled in the morning, sometimes high in the afternoon.    Not needing medication refills today.    Have you ever done Advance Care Planning? (For example, a Health Directive, POLST, or a discussion with a medical provider or your loved ones about your wishes): Yes, patient states has an Advance Care Planning document and will bring a copy to the clinic.     Fall risk  Fallen 2 or more times in the past year?: No  Any fall with injury in the past year?: No    Cognitive Screening   1) Repeat 3 items (Leader, Season, Table)    2) Clock draw: NORMAL  3) 3 item recall: Recalls 3 objects  Results: 3 items recalled: COGNITIVE IMPAIRMENT LESS LIKELY    Mini-CogTM Copyright VIK Patel. Licensed by the author for use in [employer]; reprinted with permission (everette@.[facility]). All rights reserved.  "     Do you have sleep apnea, excessive snoring or daytime drowsiness?: yes    Reviewed and updated as needed this visit by clinical staff   Tobacco  Allergies  Meds  Problems  Med Hx  Surg Hx  Fam Hx          Reviewed and updated as needed this visit by Provider   Tobacco  Allergies  Meds  Problems  Med Hx  Surg Hx  Fam Hx       Social history reviewed.  Social History     Tobacco Use     Smoking status: Never     Smokeless tobacco: Never   Substance Use Topics     Alcohol use: Yes     Comment: 3.5% beer         Alcohol Use 2/19/2023   Prescreen: >3 drinks/day or >7 drinks/week? Yes   Prescreen: >3 drinks/day or >7 drinks/week? -   AUDIT SCORE  6     Current providers sharing in care for this patient include:   Patient Care Team:  Kirsten Molina MD as PCP - General (Family Practice)  Kirsten Molina MD as Assigned PCP    The following health maintenance items are reviewed in Epic and correct as of today:  Health Maintenance   Topic Date Due     HF ACTION PLAN  Never done     MEDICARE ANNUAL WELLNESS VISIT  12/21/2022     MAMMO SCREENING  12/23/2022     BMP  06/19/2023     ALT  12/19/2023     LIPID  02/24/2024     MICROALBUMIN  02/24/2024     TSH W/FREE T4 REFLEX  02/24/2024     ANNUAL REVIEW OF HM ORDERS  02/24/2024     FALL RISK ASSESSMENT  02/24/2024     CBC  02/24/2024     ADVANCE CARE PLANNING  02/24/2028     COLORECTAL CANCER SCREENING  07/16/2028     DTAP/TDAP/TD IMMUNIZATION (4 - Td or Tdap) 11/03/2030     DEXA  10/11/2031     HEPATITIS C SCREENING  Completed     PHQ-2 (once per calendar year)  Completed     INFLUENZA VACCINE  Completed     Pneumococcal Vaccine: 65+ Years  Completed     URINALYSIS  Completed     ZOSTER IMMUNIZATION  Completed     COVID-19 Vaccine  Completed     IPV IMMUNIZATION  Aged Out     MENINGITIS IMMUNIZATION  Aged Out     Lab work is in process  BP Readings from Last 3 Encounters:   02/24/23 137/86   05/13/22 116/68   02/02/22 118/68    Wt Readings from Last 3  Encounters:   02/24/23 91.6 kg (202 lb)   05/13/22 90.3 kg (199 lb)   02/02/22 88.9 kg (196 lb)                  Patient Active Problem List   Diagnosis     CARDIOVASCULAR SCREENING; LDL GOAL LESS THAN 160     Postmenopausal- LMP 2001     Fibroid, uterine     Ganglion cyst     Advanced directives, counseling/discussion     Urinary urgency     Atrophic vaginitis     Urinary frequency     Urge incontinence     Hypothyroidism due to acquired atrophy of thyroid     Mild concentric left ventricular hypertrophy (LVH)     Paroxysmal atrial fibrillation (H)     Chronic diastolic congestive heart failure (H)     Morbid obesity (H)     Prediabetes     Past Surgical History:   Procedure Laterality Date     COLONOSCOPY  03/11/10    moderate internal hemorrhoids- 10 yr f/u     COLONOSCOPY N/A 7/16/2021    Procedure: Colonoscopy, With Polypectomy And Biopsy;  Surgeon: Kirsten Molina MD;  Location: [facility] OR     COLONOSCOPY WITH CO2 INSUFFLATION N/A 7/16/2021    Procedure: COLONOSCOPY, WITH CO2 INSUFFLATION;  Surgeon: Kirsten Molina MD;  Location: [facility] OR     EYE SURGERY  10/17/2017    Cateract removal     HC CORRECT BUNION,DOUBLE OSTEOTOMY  12/08/1998    Left foot bunion removal     HC HYSTEROSCOPY W ENDOMETRIAL BX/POLYPECTOMY W/WO D&C  12/21/2004     HC LAPAROSCOPY, SURGICAL; CHOLECYSTECTOMY  02/14/2005     ZZC REPAIR CRUCIATE LIGAMENT,KNEE  1996    Left knee       Social History     Tobacco Use     Smoking status: Never     Smokeless tobacco: Never   Substance Use Topics     Alcohol use: Yes     Comment: 3.5% beer     Family History   Problem Relation Age of Onset     Cancer Mother         cervical     Genitourinary Problems Mother         kidney disease stage 3     Arthritis Mother      Circulatory Mother         blood thinner; water pill     Lipids Mother      Respiratory Mother         tuberculosis; COPD     Hypertension Mother         Mother     Osteoporosis Mother      Other Cancer Mother         cervical      Hyperlipidemia Mother      Unknown/Adopted Father      Cardiovascular Maternal Grandfather         heart attack     Heart Disease Maternal Grandfather         heart attack     Unknown/Adopted Paternal Grandmother      Unknown/Adopted Paternal Grandfather      Thyroid Disease Daughter      Diabetes Daughter         Daughter     Depression Daughter      Anxiety Disorder Daughter      Obesity Daughter      Diabetes Maternal Grandmother      Cerebrovascular Disease Maternal Grandmother         Mother     Thyroid Disease Daughter      Obesity Daughter      Breast Cancer Cousin      C.A.D. No family hx of      Alzheimer Disease No family hx of      Blood Disease No family hx of      Eye Disorder No family hx of      Gastrointestinal Disease No family hx of      Musculoskeletal Disorder No family hx of      Neurologic Disorder No family hx of      Cancer - colorectal No family hx of      Prostate Cancer No family hx of      Asthma No family hx of      Ovarian Cancer No family hx of      Depression/Anxiety No family hx of      Anesthesia Reaction No family hx of      Thyroid Disease No family hx of      Chemical Addiction No family hx of      Known Genetic Syndrome No family hx of          Current Outpatient Medications   Medication Sig Dispense Refill     ELIQUIS ANTICOAGULANT 5 MG tablet TAKE ONE TABLET BY MOUTH TWICE A  tablet 0     levothyroxine (SYNTHROID/LEVOTHROID) 75 MCG tablet Take 1 tablet (75 mcg) by mouth daily 90 tablet 1     metoprolol succinate ER (TOPROL XL) 100 MG 24 hr tablet TAKE ONE TABLET BY MOUTH TWICE A  tablet 1     Omega-3 Fatty Acids (FISH OIL PO) Take  by mouth.       oxybutynin (DITROPAN) 5 MG tablet Take 1 tablet (5 mg) by mouth daily 90 tablet 0     sotalol (BETAPACE) 80 MG tablet Take 80 mg by mouth 2 times daily       Allergies   Allergen Reactions     No Known Drug Allergies      Mammogram Screening: Mammogram Screening: Recommended mammography every 1-2 years with patient  "discussion and risk factor consideration    Review of Systems   Constitutional: Negative for chills and fever.   HENT: Positive for congestion. Negative for ear pain, hearing loss and sore throat.    Eyes: Negative for pain and visual disturbance.   Respiratory: Negative for cough and shortness of breath.    Cardiovascular: Positive for palpitations. Negative for chest pain and peripheral edema.   Gastrointestinal: Positive for constipation. Negative for abdominal pain, diarrhea, heartburn, hematochezia and nausea.   Breasts:  Negative for tenderness, breast mass and discharge.   Genitourinary: Negative for dysuria, frequency, genital sores, hematuria, pelvic pain, urgency, vaginal bleeding and vaginal discharge.   Musculoskeletal: Negative for arthralgias, joint swelling and myalgias.   Skin: Negative for rash.   Neurological: Positive for headaches. Negative for dizziness, weakness and paresthesias.   Psychiatric/Behavioral: Negative for mood changes. The patient is not nervous/anxious.      OBJECTIVE:   /86 (BP Location: Left arm, Patient Position: Sitting, Cuff Size: Adult Regular)   Pulse 72   Temp 98.2  F (36.8  C) (Temporal)   Resp 18   Ht 1.58 m (5' 2.21\")   Wt 91.6 kg (202 lb)   LMP 10/31/2004 (Exact Date)   SpO2 96%   Breastfeeding No   BMI 36.70 kg/m   Estimated body mass index is 36.7 kg/m  as calculated from the following:    Height as of this encounter: 1.58 m (5' 2.21\").    Weight as of this encounter: 91.6 kg (202 lb).  Physical Exam  GENERAL: healthy, alert and no distress  EYES: Eyes grossly normal to inspection, PERRL and conjunctivae and sclerae normal  HENT: ear canals and TM's normal, nose and mouth without ulcers or lesions  NECK: no adenopathy, no asymmetry, masses, or scars and thyroid normal to palpation  RESP: lungs clear to auscultation - no rales, rhonchi or wheezes  BREAST: deferred by patient.  CV: regular rate and rhythm, normal S1 S2, no S3 or S4, no murmur, click or " rub, no peripheral edema and peripheral pulses strong  ABDOMEN: soft, nontender, no hepatosplenomegaly, no masses and bowel sounds normal  MS: no gross musculoskeletal defects noted, no edema  SKIN: no suspicious lesions or rashes  NEURO: Normal strength and tone, mentation intact and speech normal  PSYCH: mentation appears normal, affect normal/bright    Diagnostic Test Results: Pending    ASSESSMENT / PLAN:   1. Encounter for Medicare annual wellness exam: Discussed personal health and safety. Routine screenings as below. Appropriate anticipatory guidance, vaccinations, and health screening recommendations delivered according to the USPSTF and other appropriate society guidelines.  Patient understands and is agreeable with the plan ordered below.  - Albumin Random Urine Quantitative with Creat Ratio; Future  - CBC with Platelets  - Lipid panel reflex to direct LDL Non-fasting    2. Chronic diastolic congestive heart failure (H): Follow with cardiology. Check CBC while on eliquis. Due for lipid screen.  - Lipid panel reflex to direct LDL Non-fasting; Future  - CBC with Platelets; Future    3. Paroxysmal atrial fibrillation (H): Follow with cardiology.    4. Hypothyroidism due to acquired atrophy of thyroid: Due for labs.  - TSH with free T4 reflex    5. Urge incontinence: On oxybutynin, doesn't need refills currently.    6. Screening for hyperlipidemia  - Lipid panel reflex to direct LDL Non-fasting; Future    7. Visit for screening mammogram  - MA SCREENING DIGITAL BILAT - Future  (s+30); Future    8. Morbid obesity (H): Encourage diet and exercise changes for overall health improvement.      COUNSELING:  Reviewed preventive health counseling, as reflected in patient instructions       Regular exercise       Healthy diet/nutrition       Vision screening       Hearing screening       Colon cancer screening      BMI:   Estimated body mass index is 36.7 kg/m  as calculated from the following:    Height as of this  "encounter: 1.58 m (5' 2.21\").    Weight as of this encounter: 91.6 kg (202 lb).   Weight management plan: Discussed healthy diet and exercise guidelines      She reports that she has never smoked. She has never used smokeless tobacco.      Appropriate preventive services were discussed with this patient, including applicable screening as appropriate for cardiovascular disease, diabetes, osteopenia/osteoporosis, and glaucoma.  As appropriate for age/gender, discussed screening for colorectal cancer, prostate cancer, breast cancer, and cervical cancer. Checklist reviewing preventive services available has been given to the patient.    Reviewed patients plan of care and provided an AVS. The Basic Care Plan (routine screening as documented in Health Maintenance) for Patricia meets the Care Plan requirement. This Care Plan has been established and reviewed with the Patient.          Andrew Mccollum MD  [facility]    Identified Health Risks:  "

## 2023-02-24 NOTE — PATIENT INSTRUCTIONS
Patient Education   Personalized Prevention Plan  You are due for the preventive services outlined below.  Your care team is available to assist you in scheduling these services.  If you have already completed any of these items, please share that information with your care team to update in your medical record.  Health Maintenance Due   Topic Date Due     Heart Failure Action Plan  Never done     ANNUAL REVIEW OF HM ORDERS  06/15/2022     Annual Wellness Visit  12/21/2022     Cholesterol Lab  12/21/2022     Kidney Microalbumin Urine Test  12/21/2022     Complete Blood Count  12/21/2022     Mammogram  12/23/2022

## 2023-02-24 NOTE — RESULT ENCOUNTER NOTE
Please inform of results if patient has not viewed in [facility] within 3 business days.    Your blood cell counts are essentially normal.    Your other labs are pending.    Please call the clinic with any questions you may have.     Have a great day,    Dr. Jones

## 2023-02-27 DIAGNOSIS — E78.5 HYPERLIPIDEMIA LDL GOAL <100: Primary | ICD-10-CM

## 2023-03-25 ENCOUNTER — HEALTH MAINTENANCE LETTER (OUTPATIENT)
Age: 73
End: 2023-03-25

## 2023-03-30 ENCOUNTER — ANCILLARY PROCEDURE (OUTPATIENT)
Dept: MAMMOGRAPHY | Facility: CLINIC | Age: 73
End: 2023-03-30
Attending: FAMILY MEDICINE
Payer: MEDICARE

## 2023-03-30 DIAGNOSIS — Z12.31 VISIT FOR SCREENING MAMMOGRAM: ICD-10-CM

## 2023-03-30 PROCEDURE — 77063 BREAST TOMOSYNTHESIS BI: CPT | Mod: GC | Performed by: RADIOLOGY

## 2023-03-30 PROCEDURE — 77067 SCR MAMMO BI INCL CAD: CPT | Mod: GC | Performed by: RADIOLOGY

## 2023-04-02 DIAGNOSIS — I48.0 PAROXYSMAL ATRIAL FIBRILLATION (H): ICD-10-CM

## 2023-04-04 RX ORDER — APIXABAN 5 MG/1
TABLET, FILM COATED ORAL
Qty: 180 TABLET | Refills: 3 | Status: SHIPPED | OUTPATIENT
Start: 2023-04-04 | End: 2024-02-29

## 2023-05-08 DIAGNOSIS — N39.41 URGE INCONTINENCE: ICD-10-CM

## 2023-05-08 RX ORDER — OXYBUTYNIN CHLORIDE 5 MG/1
5 TABLET ORAL DAILY
Qty: 90 TABLET | Refills: 2 | Status: SHIPPED | OUTPATIENT
Start: 2023-05-08 | End: 2024-02-14

## 2023-05-21 DIAGNOSIS — I48.0 PAROXYSMAL ATRIAL FIBRILLATION (H): ICD-10-CM

## 2023-05-25 RX ORDER — METOPROLOL SUCCINATE 100 MG/1
TABLET, EXTENDED RELEASE ORAL
Qty: 180 TABLET | Refills: 2 | Status: SHIPPED | OUTPATIENT
Start: 2023-05-25 | End: 2024-02-14

## 2023-05-25 NOTE — TELEPHONE ENCOUNTER
Prescription approved per [facility] Refill Protocol.  Leatha Perea RN on 5/25/2023 at 8:42 AM

## 2023-05-31 ENCOUNTER — LAB (OUTPATIENT)
Dept: LAB | Facility: CLINIC | Age: 73
End: 2023-05-31
Attending: FAMILY MEDICINE
Payer: MEDICARE

## 2023-05-31 DIAGNOSIS — E78.5 HYPERLIPIDEMIA LDL GOAL <100: ICD-10-CM

## 2023-05-31 LAB
CHOLEST SERPL-MCNC: 224 MG/DL
HDLC SERPL-MCNC: 63 MG/DL
LDLC SERPL CALC-MCNC: 133 MG/DL
NONHDLC SERPL-MCNC: 161 MG/DL
TRIGL SERPL-MCNC: 141 MG/DL

## 2023-05-31 PROCEDURE — 36415 COLL VENOUS BLD VENIPUNCTURE: CPT

## 2023-05-31 PROCEDURE — 80061 LIPID PANEL: CPT

## 2023-06-01 ENCOUNTER — MYC MEDICAL ADVICE (OUTPATIENT)
Dept: FAMILY MEDICINE | Facility: CLINIC | Age: 73
End: 2023-06-01
Payer: MEDICARE

## 2023-06-01 DIAGNOSIS — E78.5 HYPERLIPIDEMIA LDL GOAL <100: Primary | ICD-10-CM

## 2023-06-01 RX ORDER — ATORVASTATIN CALCIUM 10 MG/1
10 TABLET, FILM COATED ORAL DAILY
Qty: 90 TABLET | Refills: 3 | Status: SHIPPED | OUTPATIENT
Start: 2023-06-01 | End: 2024-02-29

## 2023-06-01 NOTE — TELEPHONE ENCOUNTER
Lab 6/1/23     Barbie Richmond RN  [facility] ~ Registered Nurse  Clinic Triage ~ [facility]  June 1, 2023

## 2023-06-01 NOTE — RESULT ENCOUNTER NOTE
"Please inform of results if patient has not viewed in [facility] within 3 business days.    Lipids - Your \"bad\" cholesterol was improved by about 30 points with the diet changes you have made, but still elevated. This as you know can be improved with diet and exercise. All animal based foods such as meat, dairy, and eggs contain cholesterol. All plant based foods such as fruits, nuts, and vegetables do not.    Because of your age, cholesterol, and other risk factors, it would still be a good idea to start on a cholesterol lowering medication called a statin. The most common one I prescribe is called Lipitor. I would start you on 10 mg, the highest dose is 80 mg. The most common side effect is muscle aches in some individuals. If you would like to start this let me know and I will send this to your pharmacy. If you choose to start this we should recheck your cholesterol levels 3 months afterwards. Please let me know your decision via phone or [employer].    Please call the clinic with any questions you may have.     Have a great day,    Dr. Jones    The 10-year ASCVD risk score (Erick HANDY, et al., 2019) is: 12.1%    Values used to calculate the score:      Age: 72 years      Sex: Female      Is Non- : No      Diabetic: No      Tobacco smoker: No      Systolic Blood Pressure: 137 mmHg      Is BP treated: No      HDL Cholesterol: 63 mg/dL      Total Cholesterol: 224 mg/dL"

## 2023-06-26 ENCOUNTER — OFFICE VISIT (OUTPATIENT)
Dept: FAMILY MEDICINE | Facility: CLINIC | Age: 73
End: 2023-06-26
Payer: MEDICARE

## 2023-06-26 VITALS
SYSTOLIC BLOOD PRESSURE: 118 MMHG | RESPIRATION RATE: 18 BRPM | TEMPERATURE: 98.8 F | BODY MASS INDEX: 37.73 KG/M2 | WEIGHT: 205 LBS | DIASTOLIC BLOOD PRESSURE: 70 MMHG | OXYGEN SATURATION: 97 % | HEART RATE: 84 BPM | HEIGHT: 62 IN

## 2023-06-26 DIAGNOSIS — E03.4 HYPOTHYROIDISM DUE TO ACQUIRED ATROPHY OF THYROID: ICD-10-CM

## 2023-06-26 DIAGNOSIS — M54.16 LUMBAR RADICULOPATHY: Primary | ICD-10-CM

## 2023-06-26 DIAGNOSIS — I50.32 CHRONIC DIASTOLIC CONGESTIVE HEART FAILURE (H): ICD-10-CM

## 2023-06-26 PROCEDURE — 0134A COVID-19 BIVALENT 18+ (MODERNA): CPT | Performed by: FAMILY MEDICINE

## 2023-06-26 PROCEDURE — 91313 COVID-19 BIVALENT 18+ (MODERNA): CPT | Performed by: FAMILY MEDICINE

## 2023-06-26 PROCEDURE — 99214 OFFICE O/P EST MOD 30 MIN: CPT | Mod: 25 | Performed by: FAMILY MEDICINE

## 2023-06-26 RX ORDER — LEVOTHYROXINE SODIUM 75 UG/1
75 TABLET ORAL DAILY
Qty: 90 TABLET | Refills: 1 | Status: SHIPPED | OUTPATIENT
Start: 2023-06-26 | End: 2024-01-19

## 2023-06-26 RX ORDER — METHYLPREDNISOLONE 4 MG
TABLET, DOSE PACK ORAL
Qty: 21 TABLET | Refills: 0 | Status: SHIPPED | OUTPATIENT
Start: 2023-06-26 | End: 2023-10-31

## 2023-06-26 ASSESSMENT — PATIENT HEALTH QUESTIONNAIRE - PHQ9: SUM OF ALL RESPONSES TO PHQ QUESTIONS 1-9: 3

## 2023-06-26 ASSESSMENT — ANXIETY QUESTIONNAIRES
7. FEELING AFRAID AS IF SOMETHING AWFUL MIGHT HAPPEN: NOT AT ALL
1. FEELING NERVOUS, ANXIOUS, OR ON EDGE: NOT AT ALL
5. BEING SO RESTLESS THAT IT IS HARD TO SIT STILL: NOT AT ALL
GAD7 TOTAL SCORE: 0
4. TROUBLE RELAXING: NOT AT ALL
IF YOU CHECKED OFF ANY PROBLEMS ON THIS QUESTIONNAIRE, HOW DIFFICULT HAVE THESE PROBLEMS MADE IT FOR YOU TO DO YOUR WORK, TAKE CARE OF THINGS AT HOME, OR GET ALONG WITH OTHER PEOPLE: NOT DIFFICULT AT ALL
6. BECOMING EASILY ANNOYED OR IRRITABLE: NOT AT ALL
3. WORRYING TOO MUCH ABOUT DIFFERENT THINGS: NOT AT ALL
2. NOT BEING ABLE TO STOP OR CONTROL WORRYING: NOT AT ALL
GAD7 TOTAL SCORE: 0

## 2023-06-26 ASSESSMENT — PAIN SCALES - GENERAL: PAINLEVEL: MODERATE PAIN (4)

## 2023-06-26 NOTE — PROGRESS NOTES
Assessment & Plan     Lumbar radiculopathy  - CT Lumbar Spine w/o Contrast; Future  - methylPREDNISolone (MEDROL DOSEPAK) 4 MG tablet therapy pack; Follow Package Directions    Hypothyroidism due to acquired atrophy of thyroid  Stable, continue present management  - levothyroxine (SYNTHROID/LEVOTHROID) 75 MCG tablet; Take 1 tablet (75 mcg) by mouth daily    Chronic diastolic congestive heart failure (H)  Established with cardiology  Euvolemic        Michael Sylvester MD  [facility] YONI Rebolledo is a 72 year old, presenting for the following health issues:  Musculoskeletal Problem        6/26/2023     9:30 AM   Additional Questions   Roomed by Beverly Nelson CMA   Accompanied by self         6/26/2023     9:30 AM   Patient Reported Additional Medications   Patient reports taking the following new medications none     Musculoskeletal Problem    History of Present Illness       Reason for visit:  Sciatic like pain, left side  Symptom onset:  More than a month  Symptoms include:  Pain, weakness  Symptom intensity:  Moderate  Symptom progression:  Worsening  Had these symptoms before:  Yes  Has tried/received treatment for these symptoms:  Yes  Previous treatment was successful:  Yes  Prior treatment description:  Muscle relaxer, pain pills, exercises  What makes it worse:  Movement  What makes it better:  Exercises, muscle relaxers, pain pills, movement    She eats 2-3 servings of fruits and vegetables daily.She consumes 0 sweetened beverage(s) daily.She exercises with enough effort to increase her heart rate 30 to 60 minutes per day.  She exercises with enough effort to increase her heart rate 4 days per week.   She is taking medications regularly.       Pain History:  When did you first notice your pain? About 2 months ago   Have you seen this provider for your pain in the past? No   Where in your body do your have pain? Left hip into left upper leg  Are you seeing anyone else for your  "pain? Yes - Dr Molina  What makes your pain better? Movement helps   What makes your pain worse? Movement   How has pain affected your ability to work? Not applicable  Who lives in your household?  Juan                6/26/2023     9:38 AM   PHQ-9 SCORE   PHQ-9 Total Score 3         6/26/2023     9:40 AM   TRAMAINE-7 SCORE   Total Score 0         6/26/2023     9:41 AM   PEG Score   PEG Total Score 7         Review of Systems   Constitutional, HEENT, cardiovascular, pulmonary, GI, , musculoskeletal, neuro, skin, endocrine and psych systems are negative, except as otherwise noted.      Objective    /70   Pulse 84   Temp 98.8  F (37.1  C) (Temporal)   Resp 18   Ht 1.584 m (5' 2.36\")   Wt 93 kg (205 lb)   LMP 10/31/2004 (Exact Date)   SpO2 97%   Breastfeeding No   BMI 37.06 kg/m    Body mass index is 37.06 kg/m .  Physical Exam  Musculoskeletal:      Lumbar back: Spasms present. Decreased range of motion. Positive left straight leg raise test.        GENERAL: healthy, alert and no distress  NECK: no adenopathy, no asymmetry, masses, or scars and thyroid normal to palpation  RESP: lungs clear to auscultation - no rales, rhonchi or wheezes  CV: regular rate and rhythm, normal S1 S2, no S3 or S4, no murmur, click or rub, no peripheral edema and peripheral pulses strong  ABDOMEN: soft, nontender, no hepatosplenomegaly, no masses and bowel sounds normal                "

## 2023-06-29 ENCOUNTER — ANCILLARY PROCEDURE (OUTPATIENT)
Dept: CT IMAGING | Facility: CLINIC | Age: 73
End: 2023-06-29
Attending: FAMILY MEDICINE
Payer: MEDICARE

## 2023-06-29 DIAGNOSIS — M54.16 LUMBAR RADICULOPATHY: ICD-10-CM

## 2023-06-29 PROCEDURE — G1010 CDSM STANSON: HCPCS | Performed by: RADIOLOGY

## 2023-06-29 PROCEDURE — 72131 CT LUMBAR SPINE W/O DYE: CPT | Mod: MG | Performed by: RADIOLOGY

## 2023-07-12 ENCOUNTER — MYC MEDICAL ADVICE (OUTPATIENT)
Dept: FAMILY MEDICINE | Facility: CLINIC | Age: 73
End: 2023-07-12
Payer: MEDICARE

## 2023-07-12 DIAGNOSIS — M54.16 LUMBAR RADICULOPATHY: Primary | ICD-10-CM

## 2023-07-31 ENCOUNTER — TELEPHONE (OUTPATIENT)
Dept: FAMILY MEDICINE | Facility: CLINIC | Age: 73
End: 2023-07-31
Payer: MEDICARE

## 2023-07-31 NOTE — TELEPHONE ENCOUNTER
Forms/Letter Request    Type of form/letter: Plan of Care    Have you been seen for this request: Yes      Do we have the form/letter: Yes:      Who is the form from? Courtney Carranza [facility]  (if other please explain)    Where did/will the form come from? form was faxed in    When is form/letter needed by: as soon as time permits    How would you like the form/letter returned: Fax : to:  Courtney Carranza [facility] @ [phone]    Patient Notified form requests are processed in 3-5 business days:No    Could we send this information to you in [employer] or would you prefer to receive a phone call?:   Patient would like to be contacted via [employer]

## 2023-09-27 NOTE — TELEPHONE ENCOUNTER
SPINE PATIENTS - NEW PROTOCOL PREVISIT    RECORDS RECEIVED FROM: Internal   REASON FOR VISIT: Lumbar radiculopathy    Date of Appt: 10/31/2023   NOTES (FOR ALL VISITS) STATUS DETAILS   OFFICE NOTE from referring provider Internal 06/26/2023 Dr Sylvester [facility]    OFFICE NOTE from other specialist Care Everywhere 09/20/2023 PT [facility]    DISCHARGE SUMMARY from hospital N/A    DISCHARGE REPORT from ER N/A    OPERATIVE REPORT N/A    EMG REPORT N/A    MEDICATION LIST N/A    IMAGING  (FOR ALL VISITS)     MRI (HEAD, NECK, SPINE) N/A    XRAY (SPINE) *NEUROSURGERY* N/A    CT (HEAD, NECK, SPINE) Internal 06/29/2023 lumbar spine
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
## 2023-10-30 ENCOUNTER — TELEPHONE (OUTPATIENT)
Dept: FAMILY MEDICINE | Facility: CLINIC | Age: 73
End: 2023-10-30

## 2023-10-30 NOTE — TELEPHONE ENCOUNTER
Forms/Letter Request    Type of form/letter:  plan of care     Have you been seen for this request: N/A    Do we have the form/letter: Yes    Who is the form from? [facility]  (if other please explain)    Where did/will the form come from? form was faxed in    When is form/letter needed by: as soon as you can sign and fax back     How would you like the form/letter returned: Fax : [phone]    Patient Notified form requests are processed in 3-5 business days:No    Could we send this information to you in [employer] or would you prefer to receive a phone call?:   Patient would like to be contacted via [employer]

## 2023-10-31 ENCOUNTER — OFFICE VISIT (OUTPATIENT)
Dept: NEUROSURGERY | Facility: CLINIC | Age: 73
End: 2023-10-31
Attending: FAMILY MEDICINE
Payer: MEDICARE

## 2023-10-31 ENCOUNTER — PRE VISIT (OUTPATIENT)
Dept: NEUROSURGERY | Facility: CLINIC | Age: 73
End: 2023-10-31

## 2023-10-31 ENCOUNTER — ANCILLARY PROCEDURE (OUTPATIENT)
Dept: GENERAL RADIOLOGY | Facility: CLINIC | Age: 73
End: 2023-10-31
Attending: PREVENTIVE MEDICINE
Payer: MEDICARE

## 2023-10-31 VITALS
BODY MASS INDEX: 38.28 KG/M2 | HEART RATE: 87 BPM | SYSTOLIC BLOOD PRESSURE: 157 MMHG | DIASTOLIC BLOOD PRESSURE: 100 MMHG | WEIGHT: 208 LBS | HEIGHT: 62 IN

## 2023-10-31 DIAGNOSIS — M25.552 PAIN OF LEFT HIP: Primary | ICD-10-CM

## 2023-10-31 DIAGNOSIS — M25.552 PAIN OF LEFT HIP: ICD-10-CM

## 2023-10-31 DIAGNOSIS — M16.12 PRIMARY OSTEOARTHRITIS OF LEFT HIP: ICD-10-CM

## 2023-10-31 PROCEDURE — 99204 OFFICE O/P NEW MOD 45 MIN: CPT | Performed by: PREVENTIVE MEDICINE

## 2023-10-31 PROCEDURE — 73502 X-RAY EXAM HIP UNI 2-3 VIEWS: CPT | Mod: LT | Performed by: STUDENT IN AN ORGANIZED HEALTH CARE EDUCATION/TRAINING PROGRAM

## 2023-10-31 RX ORDER — METOPROLOL SUCCINATE 25 MG/1
1 TABLET, EXTENDED RELEASE ORAL
COMMUNITY
Start: 2023-10-23

## 2023-10-31 ASSESSMENT — PAIN SCALES - GENERAL: PAINLEVEL: MODERATE PAIN (4)

## 2023-10-31 NOTE — PATIENT INSTRUCTIONS
I am I am reasonably certain this is coming from hip arthritis and I think a hip replacement is in your future.  Let me see what Dr. Kaiden Mendez thanks.

## 2023-10-31 NOTE — NURSING NOTE
"Reason For Visit:   Chief Complaint   Patient presents with    Consult     Left hip pain         Occupation: former   Currently working? No.  Work status?  Retired.    Sports: n  Activities: biking and swimming             BP (!) 157/100   Pulse 87   Ht 1.575 m (5' 2\")   Wt 94.3 kg (208 lb)   LMP 10/31/2004 (Exact Date)   BMI 38.04 kg/m        Allergies   Allergen Reactions    No Known Drug Allergy        Current Outpatient Medications   Medication    atorvastatin (LIPITOR) 10 MG tablet    ELIQUIS ANTICOAGULANT 5 MG tablet    levothyroxine (SYNTHROID/LEVOTHROID) 75 MCG tablet    metoprolol succinate ER (TOPROL XL) 100 MG 24 hr tablet    metoprolol succinate ER (TOPROL XL) 25 MG 24 hr tablet    Omega-3 Fatty Acids (FISH OIL PO)    oxybutynin (DITROPAN) 5 MG tablet    sotalol (BETAPACE) 80 MG tablet     No current facility-administered medications for this visit.         Darla Severin-Brown, KAMILA   "
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
## 2023-11-03 ENCOUNTER — MYC MEDICAL ADVICE (OUTPATIENT)
Dept: FAMILY MEDICINE | Facility: CLINIC | Age: 73
End: 2023-11-03
Payer: MEDICARE

## 2023-11-08 ENCOUNTER — LAB (OUTPATIENT)
Dept: LAB | Facility: CLINIC | Age: 73
End: 2023-11-08
Payer: MEDICARE

## 2023-11-08 DIAGNOSIS — I50.32 CHRONIC DIASTOLIC CONGESTIVE HEART FAILURE (H): Primary | ICD-10-CM

## 2023-11-08 DIAGNOSIS — E78.5 HYPERLIPIDEMIA LDL GOAL <100: ICD-10-CM

## 2023-11-08 LAB
ANION GAP SERPL CALCULATED.3IONS-SCNC: 9 MMOL/L (ref 7–15)
BUN SERPL-MCNC: 14 MG/DL (ref 8–23)
CALCIUM SERPL-MCNC: 10 MG/DL (ref 8.8–10.2)
CHLORIDE SERPL-SCNC: 102 MMOL/L (ref 98–107)
CHOLEST SERPL-MCNC: 168 MG/DL
CREAT SERPL-MCNC: 0.68 MG/DL (ref 0.51–0.95)
DEPRECATED HCO3 PLAS-SCNC: 28 MMOL/L (ref 22–29)
EGFRCR SERPLBLD CKD-EPI 2021: >90 ML/MIN/1.73M2
GLUCOSE SERPL-MCNC: 108 MG/DL (ref 70–99)
HDLC SERPL-MCNC: 65 MG/DL
LDLC SERPL CALC-MCNC: 70 MG/DL
NONHDLC SERPL-MCNC: 103 MG/DL
POTASSIUM SERPL-SCNC: 4.6 MMOL/L (ref 3.4–5.3)
SODIUM SERPL-SCNC: 139 MMOL/L (ref 135–145)
TRIGL SERPL-MCNC: 163 MG/DL

## 2023-11-08 PROCEDURE — 80048 BASIC METABOLIC PNL TOTAL CA: CPT

## 2023-11-08 PROCEDURE — 36415 COLL VENOUS BLD VENIPUNCTURE: CPT

## 2023-11-08 PROCEDURE — 80061 LIPID PANEL: CPT

## 2023-11-08 NOTE — RESULT ENCOUNTER NOTE
Please inform of results if patient has not viewed in [email or web address] within 3 business days.    Lipids - Your cholesterol lab results were normal. Continue on your current cholesterol medication.    Please call the clinic with any questions you may have.     Have a great day,    Dr. Jones

## 2023-11-20 ENCOUNTER — OFFICE VISIT (OUTPATIENT)
Dept: ORTHOPEDICS | Facility: CLINIC | Age: 73
End: 2023-11-20
Payer: MEDICARE

## 2023-11-20 VITALS
HEIGHT: 63 IN | SYSTOLIC BLOOD PRESSURE: 128 MMHG | DIASTOLIC BLOOD PRESSURE: 81 MMHG | RESPIRATION RATE: 18 BRPM | BODY MASS INDEX: 36.68 KG/M2 | WEIGHT: 207 LBS | HEART RATE: 76 BPM

## 2023-11-20 DIAGNOSIS — M16.12 PRIMARY OSTEOARTHRITIS OF LEFT HIP: Primary | ICD-10-CM

## 2023-11-20 PROCEDURE — 99203 OFFICE O/P NEW LOW 30 MIN: CPT | Performed by: ORTHOPAEDIC SURGERY

## 2023-11-20 NOTE — LETTER
11/20/2023         RE: Patricia Haynes  [address] [place] [address]        Dear Colleague,    Thank you for referring your patient, Patricia Haynes, to the [facility]. Please see a copy of my visit note below.    Patricia Haynes is a 73 year old female who is seen in consultation at the request of Dr. Kirsten Molina for left hip pain.  She has had pain since June without really any history of injury.  She has pain somewhat in the groin but mainly over the lateral aspect of the hip.  She also has some pain across her low back.  She has seen neurosurgery who felt that her chronic back pain was not related to her current pain.  They suggested referral for hip replacement.  She indicates episodic pain rated 4 out of 10 in the hip area.  It is worse with movement such as standing and walking.  She has had physical therapy since July, and this did not help.  In the past she has been very active in biking, but is now having trouble lifting her leg over her bike.  She notes more weakness of the left leg than pain.  She has atrial fibrillation and a pacemaker.    X-ray of the left hip on 10/31/2023 shows mild to moderate arthritic changes.  There is still very good joint preservation.  A  film from a lumbar CT on 6/29/2023 shows no significant arthritic changes of either hip.    Past Medical History:   Diagnosis Date     Atrial fibrillation (H)      Chronic diastolic congestive heart failure (H)      Fibroid, uterine early 2000s    not symptomatic     Ganglion cyst     left palm     History of blood transfusion 6/4/85    Excessive bleeding during childbirth     Hypothyroidism due to acquired atrophy of thyroid 1/23/2018     Mild concentric left ventricular hypertrophy (LVH) 1/24/2018     Postmenopausal 6/23/2011     Primary osteoarthritis of left hip 11/20/2023       Past Surgical History:   Procedure Laterality Date     COLONOSCOPY  03/11/10    moderate internal hemorrhoids- 10 yr  f/u     COLONOSCOPY N/A 7/16/2021    Procedure: Colonoscopy, With Polypectomy And Biopsy;  Surgeon: Kirsten Molina MD;  Location: [facility] OR     COLONOSCOPY WITH CO2 INSUFFLATION N/A 7/16/2021    Procedure: COLONOSCOPY, WITH CO2 INSUFFLATION;  Surgeon: Kirsten Molina MD;  Location: [facility] OR     EYE SURGERY  10/17/2017    Cateract removal     HC CORRECT BUNION,DOUBLE OSTEOTOMY  12/08/1998    Left foot bunion removal     HC HYSTEROSCOPY W ENDOMETRIAL BX/POLYPECTOMY W/WO D&C  12/21/2004     HC LAPAROSCOPY, SURGICAL; CHOLECYSTECTOMY  02/14/2005     ZZC REPAIR CRUCIATE LIGAMENT,KNEE  1996    Left knee       Family History   Problem Relation Age of Onset     Cancer Mother         cervical     Genitourinary Problems Mother         kidney disease stage 3     Arthritis Mother      Circulatory Mother         blood thinner; water pill     Lipids Mother      Respiratory Mother         tuberculosis; COPD     Hypertension Mother         Mother     Osteoporosis Mother      Other Cancer Mother         cervical     Hyperlipidemia Mother      Unknown/Adopted Father      Cardiovascular Maternal Grandfather         heart attack     Heart Disease Maternal Grandfather         heart attack     Unknown/Adopted Paternal Grandmother      Unknown/Adopted Paternal Grandfather      Thyroid Disease Daughter      Diabetes Daughter         Daughter     Depression Daughter      Anxiety Disorder Daughter      Obesity Daughter      Diabetes Maternal Grandmother      Cerebrovascular Disease Maternal Grandmother         Mother     Thyroid Disease Daughter      Obesity Daughter      Breast Cancer Cousin      C.A.D. No family hx of      Alzheimer Disease No family hx of      Blood Disease No family hx of      Eye Disorder No family hx of      Gastrointestinal Disease No family hx of      Musculoskeletal Disorder No family hx of      Neurologic Disorder No family hx of      Cancer - colorectal No family hx of      Prostate Cancer No family hx of       Asthma No family hx of      Ovarian Cancer No family hx of      Depression/Anxiety No family hx of      Anesthesia Reaction No family hx of      Thyroid Disease No family hx of      Chemical Addiction No family hx of      Known Genetic Syndrome No family hx of        Social History     Socioeconomic History     Marital status:      Spouse name: Not on file     Number of children: Not on file     Years of education: Not on file     Highest education level: Not on file   Occupational History     Not on file   Tobacco Use     Smoking status: Never     Smokeless tobacco: Never   Vaping Use     Vaping Use: Never used   Substance and Sexual Activity     Alcohol use: Yes     Comment: 3.5% beer     Drug use: No     Sexual activity: Yes     Partners: Male     Birth control/protection: Post-menopausal, Male Surgical, None     Comment: spouse vasectomy approx 1987   Other Topics Concern     Parent/sibling w/ CABG, MI or angioplasty before 65F 55M? No   Social History Narrative     Not on file     Social Determinants of Health     Financial Resource Strain: Not on file   Food Insecurity: Not on file   Transportation Needs: Not on file   Physical Activity: Not on file   Stress: Not on file   Social Connections: Not on file   Interpersonal Safety: Not on file   Housing Stability: Not on file       Current Outpatient Medications   Medication Sig Dispense Refill     atorvastatin (LIPITOR) 10 MG tablet Take 1 tablet (10 mg) by mouth daily 90 tablet 3     ELIQUIS ANTICOAGULANT 5 MG tablet TAKE ONE TABLET BY MOUTH TWICE A  tablet 3     levothyroxine (SYNTHROID/LEVOTHROID) 75 MCG tablet Take 1 tablet (75 mcg) by mouth daily 90 tablet 1     metoprolol succinate ER (TOPROL XL) 100 MG 24 hr tablet TAKE ONE TABLET BY MOUTH TWICE A  tablet 2     metoprolol succinate ER (TOPROL XL) 25 MG 24 hr tablet Take 1 tablet by mouth 2 times daily       Omega-3 Fatty Acids (FISH OIL PO) Take  by mouth.       oxybutynin (DITROPAN) 5  "MG tablet Take 1 tablet (5 mg) by mouth daily 90 tablet 2     sotalol (BETAPACE) 80 MG tablet Take 120 mg by mouth 2 times daily         Allergies   Allergen Reactions     No Known Drug Allergy        REVIEW OF SYSTEMS:  CONSTITUTIONAL:  NEGATIVE for fever, chills, change in weight, not feeling tired  SKIN:  NEGATIVE for worrisome rashes, no skin lumps, no skin ulcers and no non-healing wounds  EYES:  NEGATIVE for vision changes or irritation.  ENT/MOUTH:  NEGATIVE.  No hearing loss, no hoarseness, no difficulty swallowing.  RESP:  NEGATIVE. No cough or shortness of breath.  CV:  NEGATIVE for chest pain, palpitations or peripheral edema  GI:  NEGATIVE for nausea, abdominal pain, heartburn, or change in bowel habits  :  Negative. No dysuria, no hematuria  MUSCULOSKELETAL:  See HPI above  NEURO:  NEGATIVE . No headaches, no dizziness,  no numbness  ENDOCRINE:  NEGATIVE for temperature intolerance, skin/hair changes  HEME/ALLERGY/IMMUNE:  NEGATIVE for bleeding problems  PSYCHIATRIC:  NEGATIVE. no anxiety, no depression.     Exam:  Vitals: /81   Pulse 76   Resp 18   Ht 1.6 m (5' 3\")   Wt 93.9 kg (207 lb)   LMP 10/31/2004 (Exact Date)   BMI 36.67 kg/m    BMI= Body mass index is 36.67 kg/m .  Constitutional:  healthy, alert and no distress  Neuro: Alert and Oriented x 3, no focal defects.  Psych: Affect normal   Respiratory: Breathing not labored.  Cardiovascular: normal peripheral pulses  Lymph: no adenopathy  Skin: No rashes,worrisome lesions or skin problems  She does have tenderness at the left SI joint.  She had minimal tenderness at the left sciatic notch.  She did have tenderness over the left greater trochanter.  She did get some pain in the groin as well.  She has very good hip rotation on the right of 70 degrees external rotation and 30 to 40 degrees internal rotation.  On the left she is limited to 30 degrees external rotation and 10 to 15 degrees internal rotation.  This restriction of motion is " much greater than I would expect for the x-ray findings.  She has good range of motion of the knees.  Sensation, motor and circulation are intact.    Assessment:  left hip osteoarthritis.  This would appear significant by lack of motion, but mild by x-ray findings.  One of her main concerns is weakness of the left leg and hip because she tries to lift the leg up.  This would not really be consistent with hip arthritis.    Plan: I have suggested referral for left hip injection.  This would allow us to see if the symptoms go away when the hip is bathed in lidocaine.  If she gets good relief from this injection, we could repeat a low AP pelvis x-ray sometime in the future.  She cannot take anti-inflammatories as she is on Eliquis.    Again, thank you for allowing me to participate in the care of your patient.        Sincerely,        Curly Hartman MD

## 2023-11-21 NOTE — PROGRESS NOTES
Patricia Haynes is a 73 year old female who is seen in consultation at the request of Dr. Kirsten Molina for left hip pain.  She has had pain since June without really any history of injury.  She has pain somewhat in the groin but mainly over the lateral aspect of the hip.  She also has some pain across her low back.  She has seen neurosurgery who felt that her chronic back pain was not related to her current pain.  They suggested referral for hip replacement.  She indicates episodic pain rated 4 out of 10 in the hip area.  It is worse with movement such as standing and walking.  She has had physical therapy since July, and this did not help.  In the past she has been very active in biking, but is now having trouble lifting her leg over her bike.  She notes more weakness of the left leg than pain.  She has atrial fibrillation and a pacemaker.    X-ray of the left hip on 10/31/2023 shows mild to moderate arthritic changes.  There is still very good joint preservation.  A  film from a lumbar CT on 6/29/2023 shows no significant arthritic changes of either hip.    Past Medical History:   Diagnosis Date    Atrial fibrillation (H)     Chronic diastolic congestive heart failure (H)     Fibroid, uterine early 2000s    not symptomatic    Ganglion cyst     left palm    History of blood transfusion 6/4/85    Excessive bleeding during childbirth    Hypothyroidism due to acquired atrophy of thyroid 1/23/2018    Mild concentric left ventricular hypertrophy (LVH) 1/24/2018    Postmenopausal 6/23/2011    Primary osteoarthritis of left hip 11/20/2023       Past Surgical History:   Procedure Laterality Date    COLONOSCOPY  03/11/10    moderate internal hemorrhoids- 10 yr f/u    COLONOSCOPY N/A 7/16/2021    Procedure: Colonoscopy, With Polypectomy And Biopsy;  Surgeon: Kirsten Molina MD;  Location: [facility] OR    COLONOSCOPY WITH CO2 INSUFFLATION N/A 7/16/2021    Procedure: COLONOSCOPY, WITH CO2 INSUFFLATION;  Surgeon: Tracy  Kirsten CHERY MD;  Location: [facility] OR    EYE SURGERY  10/17/2017    Cateract removal    HC CORRECT BUNION,DOUBLE OSTEOTOMY  12/08/1998    Left foot bunion removal    HC HYSTEROSCOPY W ENDOMETRIAL BX/POLYPECTOMY W/WO D&C  12/21/2004    HC LAPAROSCOPY, SURGICAL; CHOLECYSTECTOMY  02/14/2005    ZZC REPAIR CRUCIATE LIGAMENT,KNEE  1996    Left knee       Family History   Problem Relation Age of Onset    Cancer Mother         cervical    Genitourinary Problems Mother         kidney disease stage 3    Arthritis Mother     Circulatory Mother         blood thinner; water pill    Lipids Mother     Respiratory Mother         tuberculosis; COPD    Hypertension Mother         Mother    Osteoporosis Mother     Other Cancer Mother         cervical    Hyperlipidemia Mother     Unknown/Adopted Father     Cardiovascular Maternal Grandfather         heart attack    Heart Disease Maternal Grandfather         heart attack    Unknown/Adopted Paternal Grandmother     Unknown/Adopted Paternal Grandfather     Thyroid Disease Daughter     Diabetes Daughter         Daughter    Depression Daughter     Anxiety Disorder Daughter     Obesity Daughter     Diabetes Maternal Grandmother     Cerebrovascular Disease Maternal Grandmother         Mother    Thyroid Disease Daughter     Obesity Daughter     Breast Cancer Cousin     C.A.D. No family hx of     Alzheimer Disease No family hx of     Blood Disease No family hx of     Eye Disorder No family hx of     Gastrointestinal Disease No family hx of     Musculoskeletal Disorder No family hx of     Neurologic Disorder No family hx of     Cancer - colorectal No family hx of     Prostate Cancer No family hx of     Asthma No family hx of     Ovarian Cancer No family hx of     Depression/Anxiety No family hx of     Anesthesia Reaction No family hx of     Thyroid Disease No family hx of     Chemical Addiction No family hx of     Known Genetic Syndrome No family hx of        Social History     Socioeconomic History     Marital status:      Spouse name: Not on file    Number of children: Not on file    Years of education: Not on file    Highest education level: Not on file   Occupational History    Not on file   Tobacco Use    Smoking status: Never    Smokeless tobacco: Never   Vaping Use    Vaping Use: Never used   Substance and Sexual Activity    Alcohol use: Yes     Comment: 3.5% beer    Drug use: No    Sexual activity: Yes     Partners: Male     Birth control/protection: Post-menopausal, Male Surgical, None     Comment: spouse vasectomy approx 1987   Other Topics Concern    Parent/sibling w/ CABG, MI or angioplasty before 65F 55M? No   Social History Narrative    Not on file     Social Determinants of Health     Financial Resource Strain: Not on file   Food Insecurity: Not on file   Transportation Needs: Not on file   Physical Activity: Not on file   Stress: Not on file   Social Connections: Not on file   Interpersonal Safety: Not on file   Housing Stability: Not on file       Current Outpatient Medications   Medication Sig Dispense Refill    atorvastatin (LIPITOR) 10 MG tablet Take 1 tablet (10 mg) by mouth daily 90 tablet 3    ELIQUIS ANTICOAGULANT 5 MG tablet TAKE ONE TABLET BY MOUTH TWICE A  tablet 3    levothyroxine (SYNTHROID/LEVOTHROID) 75 MCG tablet Take 1 tablet (75 mcg) by mouth daily 90 tablet 1    metoprolol succinate ER (TOPROL XL) 100 MG 24 hr tablet TAKE ONE TABLET BY MOUTH TWICE A  tablet 2    metoprolol succinate ER (TOPROL XL) 25 MG 24 hr tablet Take 1 tablet by mouth 2 times daily      Omega-3 Fatty Acids (FISH OIL PO) Take  by mouth.      oxybutynin (DITROPAN) 5 MG tablet Take 1 tablet (5 mg) by mouth daily 90 tablet 2    sotalol (BETAPACE) 80 MG tablet Take 120 mg by mouth 2 times daily         Allergies   Allergen Reactions    No Known Drug Allergy        REVIEW OF SYSTEMS:  CONSTITUTIONAL:  NEGATIVE for fever, chills, change in weight, not feeling tired  SKIN:  NEGATIVE for worrisome  "rashes, no skin lumps, no skin ulcers and no non-healing wounds  EYES:  NEGATIVE for vision changes or irritation.  ENT/MOUTH:  NEGATIVE.  No hearing loss, no hoarseness, no difficulty swallowing.  RESP:  NEGATIVE. No cough or shortness of breath.  CV:  NEGATIVE for chest pain, palpitations or peripheral edema  GI:  NEGATIVE for nausea, abdominal pain, heartburn, or change in bowel habits  :  Negative. No dysuria, no hematuria  MUSCULOSKELETAL:  See HPI above  NEURO:  NEGATIVE . No headaches, no dizziness,  no numbness  ENDOCRINE:  NEGATIVE for temperature intolerance, skin/hair changes  HEME/ALLERGY/IMMUNE:  NEGATIVE for bleeding problems  PSYCHIATRIC:  NEGATIVE. no anxiety, no depression.     Exam:  Vitals: /81   Pulse 76   Resp 18   Ht 1.6 m (5' 3\")   Wt 93.9 kg (207 lb)   LMP 10/31/2004 (Exact Date)   BMI 36.67 kg/m    BMI= Body mass index is 36.67 kg/m .  Constitutional:  healthy, alert and no distress  Neuro: Alert and Oriented x 3, no focal defects.  Psych: Affect normal   Respiratory: Breathing not labored.  Cardiovascular: normal peripheral pulses  Lymph: no adenopathy  Skin: No rashes,worrisome lesions or skin problems  She does have tenderness at the left SI joint.  She had minimal tenderness at the left sciatic notch.  She did have tenderness over the left greater trochanter.  She did get some pain in the groin as well.  She has very good hip rotation on the right of 70 degrees external rotation and 30 to 40 degrees internal rotation.  On the left she is limited to 30 degrees external rotation and 10 to 15 degrees internal rotation.  This restriction of motion is much greater than I would expect for the x-ray findings.  She has good range of motion of the knees.  Sensation, motor and circulation are intact.    Assessment:  left hip osteoarthritis.  This would appear significant by lack of motion, but mild by x-ray findings.  One of her main concerns is weakness of the left leg and hip " because she tries to lift the leg up.  This would not really be consistent with hip arthritis.    Plan: I have suggested referral for left hip injection.  This would allow us to see if the symptoms go away when the hip is bathed in lidocaine.  If she gets good relief from this injection, we could repeat a low AP pelvis x-ray sometime in the future.  She cannot take anti-inflammatories as she is on Eliquis.

## 2023-11-29 ENCOUNTER — OFFICE VISIT (OUTPATIENT)
Dept: ORTHOPEDICS | Facility: CLINIC | Age: 73
End: 2023-11-29
Payer: MEDICARE

## 2023-11-29 DIAGNOSIS — M51.369 DDD (DEGENERATIVE DISC DISEASE), LUMBAR: Primary | ICD-10-CM

## 2023-11-29 DIAGNOSIS — M16.12 PRIMARY OSTEOARTHRITIS OF LEFT HIP: ICD-10-CM

## 2023-11-29 PROCEDURE — 99203 OFFICE O/P NEW LOW 30 MIN: CPT | Mod: 25 | Performed by: PREVENTIVE MEDICINE

## 2023-11-29 PROCEDURE — 20611 DRAIN/INJ JOINT/BURSA W/US: CPT | Mod: LT | Performed by: PREVENTIVE MEDICINE

## 2023-11-29 RX ORDER — TRIAMCINOLONE ACETONIDE 40 MG/ML
40 INJECTION, SUSPENSION INTRA-ARTICULAR; INTRAMUSCULAR
Status: SHIPPED | OUTPATIENT
Start: 2023-11-29

## 2023-11-29 RX ADMIN — TRIAMCINOLONE ACETONIDE 40 MG: 40 INJECTION, SUSPENSION INTRA-ARTICULAR; INTRAMUSCULAR at 14:47

## 2023-11-29 NOTE — LETTER
11/29/2023         RE: Patricia Haynes  [address] [place] [address]        Dear Colleague,    Thank you for referring your patient, Patricia Haynes, to the [facility] [place]. Please see a copy of my visit note below.    HISTORY OF PRESENT ILLNESS  Ms. Haynes is a pleasant 73 year old year old female who presents to clinic today with the following:  What problem are you here for? Left hip injection  Referred to discuss injection and sciatica from Dr patton  Has been having pain in left low back, leg, and hip for 5 + months  Has been doing PT    How long have you had this problem? 4 months     Have you had any recent imaging of this problem? Xrays/MRI/CT scans? Yes     Have you had treatments for this problem in the past?  -Medications? Ibuprofen, aleve  -Physical therapy? no  -Injections? no  -Surgery? no    How severe is this problem today? 0-10 scale? 3    How severe has this problem been at WORST in the past? 0-10 scale? 6-7    What do you think caused this problem? Na     Does this problem or its symptoms cause difficulty for you falling asleep or staying asleep? Yes     Anything else you want us to know about this problem? no          MEDICAL HISTORY  Patient Active Problem List   Diagnosis     Postmenopausal- LMP 2001     Fibroid, uterine     Ganglion cyst     Advanced directives, counseling/discussion     Urinary urgency     Atrophic vaginitis     Urinary frequency     Urge incontinence     Hypothyroidism due to acquired atrophy of thyroid     Mild concentric left ventricular hypertrophy (LVH)     Paroxysmal atrial fibrillation (H)     Chronic diastolic congestive heart failure (H)     Morbid obesity (H)     Prediabetes     Primary osteoarthritis of left hip       Current Outpatient Medications   Medication Sig Dispense Refill     atorvastatin (LIPITOR) 10 MG tablet Take 1 tablet (10 mg) by mouth daily 90 tablet 3     ELIQUIS ANTICOAGULANT 5 MG tablet TAKE  ONE TABLET BY MOUTH TWICE A  tablet 3     levothyroxine (SYNTHROID/LEVOTHROID) 75 MCG tablet Take 1 tablet (75 mcg) by mouth daily 90 tablet 1     metoprolol succinate ER (TOPROL XL) 100 MG 24 hr tablet TAKE ONE TABLET BY MOUTH TWICE A  tablet 2     metoprolol succinate ER (TOPROL XL) 25 MG 24 hr tablet Take 1 tablet by mouth 2 times daily       Omega-3 Fatty Acids (FISH OIL PO) Take  by mouth.       oxybutynin (DITROPAN) 5 MG tablet Take 1 tablet (5 mg) by mouth daily 90 tablet 2     sotalol (BETAPACE) 80 MG tablet Take 120 mg by mouth 2 times daily         Allergies   Allergen Reactions     No Known Drug Allergy        Family History   Problem Relation Age of Onset     Cancer Mother         cervical     Genitourinary Problems Mother         kidney disease stage 3     Arthritis Mother      Circulatory Mother         blood thinner; water pill     Lipids Mother      Respiratory Mother         tuberculosis; COPD     Hypertension Mother         Mother     Osteoporosis Mother      Other Cancer Mother         cervical     Hyperlipidemia Mother      Unknown/Adopted Father      Cardiovascular Maternal Grandfather         heart attack     Heart Disease Maternal Grandfather         heart attack     Unknown/Adopted Paternal Grandmother      Unknown/Adopted Paternal Grandfather      Thyroid Disease Daughter      Diabetes Daughter         Daughter     Depression Daughter      Anxiety Disorder Daughter      Obesity Daughter      Diabetes Maternal Grandmother      Cerebrovascular Disease Maternal Grandmother         Mother     Thyroid Disease Daughter      Obesity Daughter      Breast Cancer Cousin      C.A.D. No family hx of      Alzheimer Disease No family hx of      Blood Disease No family hx of      Eye Disorder No family hx of      Gastrointestinal Disease No family hx of      Musculoskeletal Disorder No family hx of      Neurologic Disorder No family hx of      Cancer - colorectal No family hx of      Prostate  Cancer No family hx of      Asthma No family hx of      Ovarian Cancer No family hx of      Depression/Anxiety No family hx of      Anesthesia Reaction No family hx of      Thyroid Disease No family hx of      Chemical Addiction No family hx of      Known Genetic Syndrome No family hx of      Social History     Socioeconomic History     Marital status:    Tobacco Use     Smoking status: Never     Smokeless tobacco: Never   Vaping Use     Vaping Use: Never used   Substance and Sexual Activity     Alcohol use: Yes     Comment: 3.5% beer     Drug use: No     Sexual activity: Yes     Partners: Male     Birth control/protection: Post-menopausal, Male Surgical, None     Comment: spouse vasectomy approx 1987   Other Topics Concern     Parent/sibling w/ CABG, MI or angioplasty before 65F 55M? No       Additional medical/Social/Surgical histories reviewed in [facility] and updated as appropriate.     REVIEW OF SYSTEMS (11/29/2023)  10 point ROS of systems including Constitutional, Eyes, Respiratory, Cardiovascular, Gastroenterology, Genitourinary, Integumentary, Musculoskeletal, Psychiatric, Allergic/Immunologic were all negative except for pertinent positives noted in my HPI.     PHYSICAL EXAM  VSS  Vital Signs: LMP 10/31/2004 (Exact Date)  Patient declined being weighed. There is no height or weight on file to calculate BMI.    General  - normal appearance, in no obvious distress  HEENT  - conjunctivae not injected, moist mucous membranes, normocephalic/atraumatic head, ears normal appearance, no lesions, mouth normal appearance, no scars, normal dentition and teeth present  CV  - normal femoral pulse  Pulm  - normal respiratory pattern, non-labored  Musculoskeletal - left hip  - stance: gait slightly favors affected side, no obvious leg length discrepancy, normal heel and toe walk  - inspection: no swelling or effusion,  normal bone and joint alignment, no obvious deformity  - palpation: no lateral or anterior hip  tenderness  - ROM: limited flexion and internal rotation secondary to pain, pain with passive and active ROM   - strength: 5/5 in all planes  - special tests:  (-) JESSICA  (-) FADIR  no pain with axial femoral load  Neuro  - no sensory or motor deficit, grossly normal coordination, normal muscle tone  Skin  - no ecchymosis, erythema, warmth, or induration, no obvious rash  Psych  - interactive, appropriate, normal mood and affect  Lumbar: has pain with flexion/extension, positive SLR on left  ASSESSMENT & PLAN  72 yo female with lumbar ddd, radicular pain, and left hip arthritis and pain    I independently reviewed the following imaging studies:  Left hip xray: shows arthritis  Lumbar CT: shows ddd, disc herniations  After a 20 minute discussion and examination, we decided to perform a same day injection for diagnostic and therapeutic purposes for  Left hip joint today  Cont. HEP and PT  Patient HAS completed physical therapy for 4-6 weeks  Patient has been doing home exercise physical therapy program for this problem      Appropriate PPE was utilized for prevention of spread of Covid-19.  Curly Cardenas MD, CAQSM    Intraarticular Hip Injection - Ultrasound Guided  The patient was informed of the risks and the benefits of the procedure and a written consent was signed.  The patient s left hip was prepped with chlorhexidine in sterile fashion.   40 mg of triamcinolone suspension was drawn up into a 5 mL syringe with 4 mL of 1% lidocaine.  Injection was performed using sterile technique.  Under ultrasound guidance a 3.5-inch 22-gauge needle was used to enter the femoracetabular joint.  Anterior approach was used, needle placement was visualized and documented with ultrasound.  Ultrasound visualization was necessary due to decreased joint space in the setting of osteoarthritis.  Injection performed long axis to the probe.  Injection solution visualized within the joint space.  Images were permanently stored for the  patient's record.  There were no complications. The patient tolerated the procedure well. There was negligible bleeding.            Large Joint Injection/Arthocentesis: L hip joint    Date/Time: 2023 2:47 PM    Performed by: Cruly Cardenas MD  Authorized by: Curly Cardenas MD    Indications:  Osteoarthritis and pain  Needle Size:  22 G  Guidance: ultrasound    Approach:  Anterior  Location:  Hip      Site:  L hip joint  Medications:  40 mg triamcinolone 40 MG/ML  Outcome:  Tolerated well, no immediate complications  Procedure discussed: discussed risks, benefits, and alternatives    Consent Given by:  Patient  Timeout: timeout called immediately prior to procedure    Prep: patient was prepped and draped in usual sterile fashion        [facility]   ORTHOPEDICS & SPORTS MEDICINE  [address] [place] [address]  Dept: [phone]  ______________________________________________________________________________    Patient: Patricia Haynes   : 1950   MRN: [id number]   2023    INVASIVE PROCEDURE SAFETY CHECKLIST    Date: 23  Procedure:left hip injection  Patient Name: Patricia Haynes  MRN: [id number]  YOB: 1950    Action: Complete sections as appropriate. Any discrepancy results in a HARD COPY until resolved.     PRE PROCEDURE:  Patient ID verified with 2 identifiers (name and  or MRN): Yes  Procedure and site verified with patient/designee (when able): Yes  Accurate consent documentation in medical record: Yes  H&P (or appropriate assessment) documented in medical record: Yes  H&P must be up to 20 days prior to procedure and updates within 24 hours of procedure as applicable: Yes  Relevant diagnostic and radiology test results appropriately labeled and displayed as applicable: Yes  Procedure site(s) marked with provider initials: Yes    TIMEOUT:  Time-Out performed immediately prior to starting procedure, including verbal and active  participation of all team members addressing the following:Yes  * Correct patient identify  * Confirmed that the correct side and site are marked  * An accurate procedure consent form  * Agreement on the procedure to be done  * Correct patient position  * Relevant images and results are properly labeled and appropriately displayed  * The need to administer antibiotics or fluids for irrigation purposes during the procedure as applicable   * Safety precautions based on patient history or medication use    DURING PROCEDURE: Verification of correct person, site, and procedures any time the responsibility for care of the patient is transferred to another member of the care team.       Prior to injection, verified patient identity using patient's name and date of birth.  Due to injection administration, patient instructed to remain in clinic for 15 minutes  afterwards, and to report any adverse reaction to me immediately.    Joint injection was performed.      Drug Amount Wasted:  None.  Vial/Syringe: Single dose vial  Expiration Date:  7/1/25      CARISA Booth  November 29, 2023      Again, thank you for allowing me to participate in the care of your patient.        Sincerely,        Curly Cardenas MD

## 2023-11-29 NOTE — PROGRESS NOTES
HISTORY OF PRESENT ILLNESS  Ms. Haynes is a pleasant 73 year old year old female who presents to clinic today with the following:  What problem are you here for? Left hip injection  Referred to discuss injection and sciatica from Dr patton  Has been having pain in left low back, leg, and hip for 5 + months  Has been doing PT    How long have you had this problem? 4 months     Have you had any recent imaging of this problem? Xrays/MRI/CT scans? Yes     Have you had treatments for this problem in the past?  -Medications? Ibuprofen, aleve  -Physical therapy? no  -Injections? no  -Surgery? no    How severe is this problem today? 0-10 scale? 3    How severe has this problem been at WORST in the past? 0-10 scale? 6-7    What do you think caused this problem? Na     Does this problem or its symptoms cause difficulty for you falling asleep or staying asleep? Yes     Anything else you want us to know about this problem? no          MEDICAL HISTORY  Patient Active Problem List   Diagnosis    Postmenopausal- LMP 2001    Fibroid, uterine    Ganglion cyst    Advanced directives, counseling/discussion    Urinary urgency    Atrophic vaginitis    Urinary frequency    Urge incontinence    Hypothyroidism due to acquired atrophy of thyroid    Mild concentric left ventricular hypertrophy (LVH)    Paroxysmal atrial fibrillation (H)    Chronic diastolic congestive heart failure (H)    Morbid obesity (H)    Prediabetes    Primary osteoarthritis of left hip       Current Outpatient Medications   Medication Sig Dispense Refill    atorvastatin (LIPITOR) 10 MG tablet Take 1 tablet (10 mg) by mouth daily 90 tablet 3    ELIQUIS ANTICOAGULANT 5 MG tablet TAKE ONE TABLET BY MOUTH TWICE A  tablet 3    levothyroxine (SYNTHROID/LEVOTHROID) 75 MCG tablet Take 1 tablet (75 mcg) by mouth daily 90 tablet 1    metoprolol succinate ER (TOPROL XL) 100 MG 24 hr tablet TAKE ONE TABLET BY MOUTH TWICE A  tablet 2    metoprolol succinate ER  (TOPROL XL) 25 MG 24 hr tablet Take 1 tablet by mouth 2 times daily      Omega-3 Fatty Acids (FISH OIL PO) Take  by mouth.      oxybutynin (DITROPAN) 5 MG tablet Take 1 tablet (5 mg) by mouth daily 90 tablet 2    sotalol (BETAPACE) 80 MG tablet Take 120 mg by mouth 2 times daily         Allergies   Allergen Reactions    No Known Drug Allergy        Family History   Problem Relation Age of Onset    Cancer Mother         cervical    Genitourinary Problems Mother         kidney disease stage 3    Arthritis Mother     Circulatory Mother         blood thinner; water pill    Lipids Mother     Respiratory Mother         tuberculosis; COPD    Hypertension Mother         Mother    Osteoporosis Mother     Other Cancer Mother         cervical    Hyperlipidemia Mother     Unknown/Adopted Father     Cardiovascular Maternal Grandfather         heart attack    Heart Disease Maternal Grandfather         heart attack    Unknown/Adopted Paternal Grandmother     Unknown/Adopted Paternal Grandfather     Thyroid Disease Daughter     Diabetes Daughter         Daughter    Depression Daughter     Anxiety Disorder Daughter     Obesity Daughter     Diabetes Maternal Grandmother     Cerebrovascular Disease Maternal Grandmother         Mother    Thyroid Disease Daughter     Obesity Daughter     Breast Cancer Cousin     C.A.D. No family hx of     Alzheimer Disease No family hx of     Blood Disease No family hx of     Eye Disorder No family hx of     Gastrointestinal Disease No family hx of     Musculoskeletal Disorder No family hx of     Neurologic Disorder No family hx of     Cancer - colorectal No family hx of     Prostate Cancer No family hx of     Asthma No family hx of     Ovarian Cancer No family hx of     Depression/Anxiety No family hx of     Anesthesia Reaction No family hx of     Thyroid Disease No family hx of     Chemical Addiction No family hx of     Known Genetic Syndrome No family hx of      Social History     Socioeconomic  History    Marital status:    Tobacco Use    Smoking status: Never    Smokeless tobacco: Never   Vaping Use    Vaping Use: Never used   Substance and Sexual Activity    Alcohol use: Yes     Comment: 3.5% beer    Drug use: No    Sexual activity: Yes     Partners: Male     Birth control/protection: Post-menopausal, Male Surgical, None     Comment: spouse vasectomy approx 1987   Other Topics Concern    Parent/sibling w/ CABG, MI or angioplasty before 65F 55M? No       Additional medical/Social/Surgical histories reviewed in [facility] and updated as appropriate.     REVIEW OF SYSTEMS (11/29/2023)  10 point ROS of systems including Constitutional, Eyes, Respiratory, Cardiovascular, Gastroenterology, Genitourinary, Integumentary, Musculoskeletal, Psychiatric, Allergic/Immunologic were all negative except for pertinent positives noted in my HPI.     PHYSICAL EXAM  VSS  Vital Signs: LMP 10/31/2004 (Exact Date)  Patient declined being weighed. There is no height or weight on file to calculate BMI.    General  - normal appearance, in no obvious distress  HEENT  - conjunctivae not injected, moist mucous membranes, normocephalic/atraumatic head, ears normal appearance, no lesions, mouth normal appearance, no scars, normal dentition and teeth present  CV  - normal femoral pulse  Pulm  - normal respiratory pattern, non-labored  Musculoskeletal - left hip  - stance: gait slightly favors affected side, no obvious leg length discrepancy, normal heel and toe walk  - inspection: no swelling or effusion,  normal bone and joint alignment, no obvious deformity  - palpation: no lateral or anterior hip tenderness  - ROM: limited flexion and internal rotation secondary to pain, pain with passive and active ROM   - strength: 5/5 in all planes  - special tests:  (-) JESSICA  (-) FADIR  no pain with axial femoral load  Neuro  - no sensory or motor deficit, grossly normal coordination, normal muscle tone  Skin  - no ecchymosis, erythema,  warmth, or induration, no obvious rash  Psych  - interactive, appropriate, normal mood and affect  Lumbar: has pain with flexion/extension, positive SLR on left  ASSESSMENT & PLAN  74 yo female with lumbar ddd, radicular pain, and left hip arthritis and pain    I independently reviewed the following imaging studies:  Left hip xray: shows arthritis  Lumbar CT: shows ddd, disc herniations  After a 20 minute discussion and examination, we decided to perform a same day injection for diagnostic and therapeutic purposes for  Left hip joint today  Cont. HEP and PT  Patient HAS completed physical therapy for 4-6 weeks  Patient has been doing home exercise physical therapy program for this problem      Appropriate PPE was utilized for prevention of spread of Covid-19.  Curly Cardenas MD, CAQSM    Intraarticular Hip Injection - Ultrasound Guided  The patient was informed of the risks and the benefits of the procedure and a written consent was signed.  The patient s left hip was prepped with chlorhexidine in sterile fashion.   40 mg of triamcinolone suspension was drawn up into a 5 mL syringe with 4 mL of 1% lidocaine.  Injection was performed using sterile technique.  Under ultrasound guidance a 3.5-inch 22-gauge needle was used to enter the femoracetabular joint.  Anterior approach was used, needle placement was visualized and documented with ultrasound.  Ultrasound visualization was necessary due to decreased joint space in the setting of osteoarthritis.  Injection performed long axis to the probe.  Injection solution visualized within the joint space.  Images were permanently stored for the patient's record.  There were no complications. The patient tolerated the procedure well. There was negligible bleeding.            Large Joint Injection/Arthocentesis: L hip joint    Date/Time: 11/29/2023 2:47 PM    Performed by: Curly Cardenas MD  Authorized by: Curly Cardenas MD    Indications:  Osteoarthritis and  pain  Needle Size:  22 G  Guidance: ultrasound    Approach:  Anterior  Location:  Hip      Site:  L hip joint  Medications:  40 mg triamcinolone 40 MG/ML  Outcome:  Tolerated well, no immediate complications  Procedure discussed: discussed risks, benefits, and alternatives    Consent Given by:  Patient  Timeout: timeout called immediately prior to procedure    Prep: patient was prepped and draped in usual sterile fashion        [facility]   ORTHOPEDICS & SPORTS MEDICINE  [address] [place] [address]  Dept: [phone]  ______________________________________________________________________________    Patient: Patricia Haynes   : 1950   MRN: [id number]   2023    INVASIVE PROCEDURE SAFETY CHECKLIST    Date: 23  Procedure:left hip injection  Patient Name: Patricia Haynes  MRN: [id number]  YOB: 1950    Action: Complete sections as appropriate. Any discrepancy results in a HARD COPY until resolved.     PRE PROCEDURE:  Patient ID verified with 2 identifiers (name and  or MRN): Yes  Procedure and site verified with patient/designee (when able): Yes  Accurate consent documentation in medical record: Yes  H&P (or appropriate assessment) documented in medical record: Yes  H&P must be up to 20 days prior to procedure and updates within 24 hours of procedure as applicable: Yes  Relevant diagnostic and radiology test results appropriately labeled and displayed as applicable: Yes  Procedure site(s) marked with provider initials: Yes    TIMEOUT:  Time-Out performed immediately prior to starting procedure, including verbal and active participation of all team members addressing the following:Yes  * Correct patient identify  * Confirmed that the correct side and site are marked  * An accurate procedure consent form  * Agreement on the procedure to be done  * Correct patient position  * Relevant images and results are properly labeled and appropriately displayed  *  The need to administer antibiotics or fluids for irrigation purposes during the procedure as applicable   * Safety precautions based on patient history or medication use    DURING PROCEDURE: Verification of correct person, site, and procedures any time the responsibility for care of the patient is transferred to another member of the care team.       Prior to injection, verified patient identity using patient's name and date of birth.  Due to injection administration, patient instructed to remain in clinic for 15 minutes  afterwards, and to report any adverse reaction to me immediately.    Joint injection was performed.      Drug Amount Wasted:  None.  Vial/Syringe: Single dose vial  Expiration Date:  7/1/25      Jose E Izaguirre, EMT  November 29, 2023

## 2023-12-07 ENCOUNTER — TELEPHONE (OUTPATIENT)
Dept: FAMILY MEDICINE | Facility: CLINIC | Age: 73
End: 2023-12-07
Payer: MEDICARE

## 2023-12-07 NOTE — CONFIDENTIAL NOTE
Forms/Letter Request    Type of form/letter:  Physical Therapy Plan of Care    Have you been seen for this request: N/A    Do we have the form/letter: Yes:      Who is the form from? Courtney Barker (if other please explain)    Where did/will the form come from? form was faxed in    When is form/letter needed by: complete @ your earliest conveniences    How would you like the form/letter returned: Fax : to:  [phone]    Patient Notified form requests are processed in 3-5 business days:No    Could we send this information to you in "[employer] or would you prefer to receive a phone call?:   Patient would like to be contacted via "[employer]

## 2023-12-09 DIAGNOSIS — I48.0 PAROXYSMAL ATRIAL FIBRILLATION (H): ICD-10-CM

## 2023-12-11 RX ORDER — METOPROLOL SUCCINATE 100 MG/1
TABLET, EXTENDED RELEASE ORAL
Qty: 180 TABLET | Refills: 2 | OUTPATIENT
Start: 2023-12-11

## 2024-01-19 DIAGNOSIS — E03.4 HYPOTHYROIDISM DUE TO ACQUIRED ATROPHY OF THYROID: ICD-10-CM

## 2024-01-19 RX ORDER — LEVOTHYROXINE SODIUM 75 UG/1
75 TABLET ORAL DAILY
Qty: 90 TABLET | Refills: 3 | Status: SHIPPED | OUTPATIENT
Start: 2024-01-19

## 2024-02-14 ENCOUNTER — OFFICE VISIT (OUTPATIENT)
Dept: FAMILY MEDICINE | Facility: CLINIC | Age: 74
End: 2024-02-14
Payer: MEDICARE

## 2024-02-14 VITALS
RESPIRATION RATE: 16 BRPM | SYSTOLIC BLOOD PRESSURE: 132 MMHG | TEMPERATURE: 97 F | WEIGHT: 207 LBS | BODY MASS INDEX: 38.09 KG/M2 | OXYGEN SATURATION: 96 % | DIASTOLIC BLOOD PRESSURE: 80 MMHG | HEART RATE: 81 BPM | HEIGHT: 62 IN

## 2024-02-14 DIAGNOSIS — E78.5 HYPERLIPIDEMIA LDL GOAL <100: ICD-10-CM

## 2024-02-14 DIAGNOSIS — I50.32 CHRONIC DIASTOLIC CONGESTIVE HEART FAILURE (H): ICD-10-CM

## 2024-02-14 DIAGNOSIS — I48.0 PAROXYSMAL ATRIAL FIBRILLATION (H): ICD-10-CM

## 2024-02-14 DIAGNOSIS — Z13.0 SCREENING FOR DEFICIENCY ANEMIA: ICD-10-CM

## 2024-02-14 DIAGNOSIS — E03.4 HYPOTHYROIDISM DUE TO ACQUIRED ATROPHY OF THYROID: ICD-10-CM

## 2024-02-14 DIAGNOSIS — N39.41 URGE INCONTINENCE: ICD-10-CM

## 2024-02-14 DIAGNOSIS — Z01.818 PREOP GENERAL PHYSICAL EXAM: Primary | ICD-10-CM

## 2024-02-14 DIAGNOSIS — M16.12 PRIMARY OSTEOARTHRITIS OF LEFT HIP: ICD-10-CM

## 2024-02-14 DIAGNOSIS — Z95.0 PACEMAKER: ICD-10-CM

## 2024-02-14 LAB
ALBUMIN UR-MCNC: NEGATIVE MG/DL
APPEARANCE UR: CLEAR
BILIRUB UR QL STRIP: NEGATIVE
COLOR UR AUTO: YELLOW
ERYTHROCYTE [DISTWIDTH] IN BLOOD BY AUTOMATED COUNT: 12.1 % (ref 10–15)
GLUCOSE UR STRIP-MCNC: NEGATIVE MG/DL
HCT VFR BLD AUTO: 43.7 % (ref 35–47)
HGB BLD-MCNC: 14.8 G/DL (ref 11.7–15.7)
HGB UR QL STRIP: NEGATIVE
KETONES UR STRIP-MCNC: NEGATIVE MG/DL
LEUKOCYTE ESTERASE UR QL STRIP: NEGATIVE
MCH RBC QN AUTO: 30.1 PG (ref 26.5–33)
MCHC RBC AUTO-ENTMCNC: 33.9 G/DL (ref 31.5–36.5)
MCV RBC AUTO: 89 FL (ref 78–100)
NITRATE UR QL: NEGATIVE
PH UR STRIP: 7 [PH] (ref 5–7)
PLATELET # BLD AUTO: 257 10E3/UL (ref 150–450)
RBC # BLD AUTO: 4.92 10E6/UL (ref 3.8–5.2)
SP GR UR STRIP: 1.01 (ref 1–1.03)
UROBILINOGEN UR STRIP-ACNC: 0.2 E.U./DL
WBC # BLD AUTO: 5.8 10E3/UL (ref 4–11)

## 2024-02-14 PROCEDURE — 84443 ASSAY THYROID STIM HORMONE: CPT | Performed by: PHYSICIAN ASSISTANT

## 2024-02-14 PROCEDURE — 80048 BASIC METABOLIC PNL TOTAL CA: CPT | Performed by: PHYSICIAN ASSISTANT

## 2024-02-14 PROCEDURE — 36415 COLL VENOUS BLD VENIPUNCTURE: CPT | Performed by: PHYSICIAN ASSISTANT

## 2024-02-14 PROCEDURE — 81003 URINALYSIS AUTO W/O SCOPE: CPT | Performed by: PHYSICIAN ASSISTANT

## 2024-02-14 PROCEDURE — 85027 COMPLETE CBC AUTOMATED: CPT | Performed by: PHYSICIAN ASSISTANT

## 2024-02-14 PROCEDURE — 99215 OFFICE O/P EST HI 40 MIN: CPT | Performed by: PHYSICIAN ASSISTANT

## 2024-02-14 RX ORDER — SOTALOL HYDROCHLORIDE 160 MG/1
1 TABLET ORAL
COMMUNITY
Start: 2024-01-18

## 2024-02-14 RX ORDER — METOPROLOL SUCCINATE 100 MG/1
100 TABLET, EXTENDED RELEASE ORAL 2 TIMES DAILY
Qty: 180 TABLET | Refills: 2 | Status: SHIPPED | OUTPATIENT
Start: 2024-02-14

## 2024-02-14 RX ORDER — OXYBUTYNIN CHLORIDE 5 MG/1
5 TABLET ORAL DAILY
Qty: 90 TABLET | Refills: 3 | Status: SHIPPED | OUTPATIENT
Start: 2024-02-14

## 2024-02-14 ASSESSMENT — PAIN SCALES - GENERAL: PAINLEVEL: MODERATE PAIN (4)

## 2024-02-14 NOTE — PATIENT INSTRUCTIONS
Preparing for Your Surgery  Getting started  A nurse will call you to review your health history and instructions. They will give you an arrival time based on your scheduled surgery time. Please be ready to share:  Your doctor's clinic name and phone number  Your medical, surgical, and anesthesia history  A list of allergies and sensitivities  A list of medicines, including herbal treatments and over-the-counter drugs  Whether the patient has a legal guardian (ask how to send us the papers in advance)  Please tell us if you're pregnant--or if there's any chance you might be pregnant. Some surgeries may injure a fetus (unborn baby), so they require a pregnancy test. Surgeries that are safe for a fetus don't always need a test, and you can choose whether to have one.   If you have a child who's having surgery, please ask for a copy of Preparing for Your Child's Surgery.    Preparing for surgery  Within 10 to 30 days of surgery: Have a pre-op exam (sometimes called an H&P, or History and Physical). This can be done at a clinic or pre-operative center.  If you're having a , you may not need this exam. Talk to your care team.  At your pre-op exam, talk to your care team about all medicines you take. If you need to stop any medicines before surgery, ask when to start taking them again.  We do this for your safety. Many medicines can make you bleed too much during surgery. Some change how well surgery (anesthesia) drugs work.  Call your insurance company to let them know you're having surgery. (If you don't have insurance, call [phone].)  Call your clinic if there's any change in your health. This includes signs of a cold or flu (sore throat, runny nose, cough, rash, fever). It also includes a scrape or scratch near the surgery site.  If you have questions on the day of surgery, call your hospital or surgery center.  Eating and drinking guidelines  For your safety: Unless your surgeon tells you otherwise,  follow the guidelines below.  Eat and drink as usual until 8 hours before you arrive for surgery. After that, no food or milk.  Drink clear liquids until 2 hours before you arrive. These are liquids you can see through, like water, Gatorade, and Propel Water. They also include plain black coffee and tea (no cream or milk), candy, and breath mints. You can spit out gum when you arrive.  If you drink alcohol: Stop drinking it the night before surgery.  If your care team tells you to take medicine on the morning of surgery, it's okay to take it with a sip of water.  Preventing infection  Shower or bathe the night before and morning of your surgery. Follow the instructions your clinic gave you. (If no instructions, use regular soap.)  Don't shave or clip hair near your surgery site. We'll remove the hair if needed.  Don't smoke or vape the morning of surgery. You may chew nicotine gum up to 2 hours before surgery. A nicotine patch is okay.  Note: Some surgeries require you to completely quit smoking and nicotine. Check with your surgeon.  Your care team will make every effort to keep you safe from infection. We will:  Clean our hands often with soap and water (or an alcohol-based hand rub).  Clean the skin at your surgery site with a special soap that kills germs.  Give you a special gown to keep you warm. (Cold raises the risk of infection.)  Wear special hair covers, masks, gowns and gloves during surgery.  Give antibiotic medicine, if prescribed. Not all surgeries need antibiotics.  What to bring on the day of surgery  Photo ID and insurance card  Copy of your health care directive, if you have one  Glasses and hearing aids (bring cases)  You can't wear contacts during surgery  Inhaler and eye drops, if you use them (tell us about these when you arrive)  CPAP machine or breathing device, if you use them  A few personal items, if spending the night  If you have . . .  A pacemaker, ICD (cardiac defibrillator) or other  implant: Bring the ID card.  An implanted stimulator: Bring the remote control.  A legal guardian: Bring a copy of the certified (court-stamped) guardianship papers.  Please remove any jewelry, including body piercings. Leave jewelry and other valuables at home.  If you're going home the day of surgery  You must have a responsible adult drive you home. They should stay with you overnight as well.  If you don't have someone to stay with you, and you aren't safe to go home alone, we may keep you overnight. Insurance often won't pay for this.  After surgery  If it's hard to control your pain or you need more pain medicine, please call your surgeon's office.  Questions?   If you have any questions for your care team, list them here: _________________________________________________________________________________________________________________________________________________________________________ ____________________________________ ____________________________________ ____________________________________  For informational purposes only. Not to replace the advice of your health care provider. Copyright   2003, 2019 [employer]. All rights reserved. Clinically reviewed by Aurora Henley MD. [employer] 545156 - REV 12/22.    How to Take Your Medication Before Surgery  - HOLD (do not take) stop your Eliquis 72 hours before your procedure as instructed by your cardiology team,  - STOP taking all vitamins and herbal supplements 14 days before surgery.  Stop fish oil now, do not apply topicals the day of your procedure, and do not take ibuprofen or Aleve going forward.

## 2024-02-14 NOTE — PROGRESS NOTES
Preoperative Evaluation  [facility] [place]  [address], [address]  [place] [address]  Phone: [phone]  Fax: [phone]  Primary Provider: Kirsten Molina  Pre-op Performing Provider: WILLEM LUJAN  Feb 14, 2024       Kesha is a 73 year old, presenting for the following:  Pre-Op Exam        2/14/2024     3:02 PM   Additional Questions   Roomed by Evon GARG   Accompanied by None         2/14/2024     3:02 PM   Patient Reported Additional Medications   Patient reports taking the following new medications NA       Surgical Information  Surgery/Procedure: TOTAL JOINT REPLACEMENT ANTERIOR HIP PRESSFIT LEFT   Surgery Location: [facility]   Surgeon: Pelon Alvarado   Surgery Date: 2/19/24  Time of Surgery: 2:00 PM  Where patient plans to recover: At home with family  Fax number for surgical facility: Note does not need to be faxed, will be available electronically in Epic.    Assessment & Plan     The proposed surgical procedure is considered INTERMEDIATE risk.    Preop general physical exam  Patient is optimized for her procedure as above, she has recently seen cardiology on January 17, 2024 who has completed her EKG on January 19, 2024 and pacemaker functioning on February 1.  They have given her instructions to hold her Eliquis for 72 hours prior to her procedure and to restart it at the discretion of the surgeon    Primary osteoarthritis of left hip  Procedure as    Chronic diastolic congestive heart failure (H)  No sign of heart failure today or at her recent cardiology appointment  - Basic metabolic panel; Future  - Basic metabolic panel    Paroxysmal atrial fibrillation (H)  Rate controlled on current medication under the direction of her cardiologist  - metoprolol succinate ER (TOPROL XL) 100 MG 24 hr tablet; Take 1 tablet (100 mg) by mouth 2 times daily  - Basic metabolic panel; Future  - Basic metabolic panel    Pacemaker  Recent pacemaker function check reveals  normal functioning February 1, 2024    Urge incontinence  Well-controlled with use of medication, UA negative for infection  - oxyBUTYnin (DITROPAN) 5 MG tablet; Take 1 tablet (5 mg) by mouth daily  - UA Macroscopic with reflex to Microscopic and Culture - Lab Collect; Future  Hypothyroidism due to acquired atrophy of thyroid  TSH pending, will titrate dosage as needed- TSH with free T4 reflex; Future    - TSH with free T4 reflex      Hyperlipidemia LDL goal <100  Well-controlled, continue current dosage of atorvastatin    Screening for deficiency anemia  Hemoglobin normal  - CBC with platelets; Future  - CBC with platelets      Possible Sleep Apnea: She has the diagnosis of sleep apnea she will continue to use her CPAP machine as prescribed and bring it to the hospital for her procedure     Implanted Device  Pacemaker as above      Risks and Recommendations  The patient has the following additional risks and recommendations for perioperative complications:  Obstructive Sleep Apnea:   She will bring her CPAP machine to her procedure    Antiplatelet or Anticoagulation Medication Instructions   - apixaban (Eliquis), edoxaban (Savaysa), rivaroxaban (Xarelto): Neuraxial or regional block anticipated AND CrCL (>=) 50mL/min. HOLD 3 days before surgery.   She was instructed by her cardiology team to hold her apixaban 72 hours prior to her procedure    Additional Medication Instructions  Patient is to take all scheduled medications on the day of surgery EXCEPT for modifications listed below:   - Beta Blockers: Continue taking on the day of surgery.   - Statins: Continue taking on the day of surgery.    - anticholinergics: Continue without modification.    - Herbal medications and vitamins: HOLD 14 days prior to surgery.    Recommendation  APPROVAL GIVEN to proceed with proposed procedure, without further diagnostic evaluation.          51 minutes spent by me on the date of the encounter doing chart review, history and exam,  documentation and further activities per the note      Subjective       HPI related to upcoming procedure: She reports a history of left hip pain that was determined to be arthritic.  She did have a steroid injection in November, this did help to improve her pain for about 1 month and she has since started having more pain.  She walks with a cane.        2/8/2024     4:43 PM   Preop Questions   1. Have you ever had a heart attack or stroke? No   2. Have you ever had surgery on your heart or blood vessels, such as a stent placement, a coronary artery bypass, or surgery on an artery in your head, neck, heart, or legs? YES - SHE HAS A PACEMAKER, she also has a history of paroxysmal A-fib with cardiomyopathy.  Cardiomyopathy during rapid A-fib has since resolved according to her most recent cardiac documentation from January 2024.patient reports her cardiology team has been in contact with her surgeon regarding Eliquis.  She has a pacemaker for sick sinus tachycardia and bradycardia.   Her pacemaker was recently checked on February 1, 2024 and shows normal function   3. Do you have chest pain with activity? No   4. Do you have a history of  heart failure? No   5. Do you currently have a cold, bronchitis or symptoms of other infection? No   6. Do you have a cough, shortness of breath, or wheezing? No   7. Do you or anyone in your family have previous history of blood clots? No   8. Do you or does anyone in your family have a serious bleeding problem such as prolonged bleeding following surgeries or cuts? No   9. Have you ever had problems with anemia or been told to take iron pills? No   10. Have you had any abnormal blood loss such as black, tarry or bloody stools, or abnormal vaginal bleeding? No   11. Have you ever had a blood transfusion? YES - with childbirth 38 years ago   11a. Have you ever had a transfusion reaction? No   12. Are you willing to have a blood transfusion if it is medically needed before, during, or  after your surgery? Yes   13. Have you or any of your relatives ever had problems with anesthesia? No   14. Do you have sleep apnea, excessive snoring or daytime drowsiness? YES - sleep apnea    14a. Do you have a CPAP machine? Yes- works well for her   15. Do you have any artifical heart valves or other implanted medical devices like a pacemaker, defibrillator, or continuous glucose monitor? YES - pacemaker, pacemaker was recently checked February 1, 2024 and has normal function   15a. What type of device do you have? ElationEMR   15b. Name of the clinic that manages your device:  [facility]   16. Do you have artificial joints? No   17. Are you allergic to latex? No       Health Care Directive  Patient does not have a Health Care Directive or Living Will: Patient states has Advance Directive and will bring in a copy to clinic.    Preoperative Review of    reviewed - no record of controlled substances prescribed.      Status of Chronic Conditions:  See problem list for active medical problems.  Problems all longstanding and stable, except as noted/documented.  See ROS for pertinent symptoms related to these conditions.    A-FIB - Patient has a longstanding history of chronic A-fib currently on rate and rhythm control. Current treatment regimen includes Apixaban for stroke prevention and denies significant symptoms of lightheadedness, palpitations or dyspnea.     CHF - Patient has a history of CHF. NO CURRENT SYMPTOMS PER CARDIO VISIT Jan 2024.  . Currently the patient's condition is stable. Current treatment regimen includes beta blocker. The patient denies chest pain, edema, orthopnea, SOB or recent weight gain. Last Echocardiogram June 2023, EKG 1-19-24.     HYPERLIPIDEMIA - Patient has a long history of significant Hyperlipidemia requiring medication for treatment with recent good control. Patient reports no problems or side effects with the medication.     HYPERTENSION - Patient has longstanding history of  HTN , currently denies any symptoms referable to elevated blood pressure. Specifically denies chest pain, palpitations, dyspnea, orthopnea, PND or peripheral edema. Blood pressure readings have been in normal range. Current medication regimen is as listed below. Patient denies any side effects of medication.     HYPOTHYROIDISM - Patient has a longstanding history of chronic Hypothyroidism. Patient has been doing well, noting no tremor, insomnia, hair loss or changes in skin texture. Continues to take medications as directed, without adverse reactions or side effects. Last TSH   Lab Results   Component Value Date    TSH 1.35 02/24/2023   .      She has a history of sleep apnea which is well treated with her CPAP machine.    Patient Active Problem List    Diagnosis Date Noted    Primary osteoarthritis of left hip 11/20/2023     Priority: Medium    Chronic diastolic congestive heart failure (H) 01/06/2022     Priority: Medium    Morbid obesity (H) 01/06/2022     Priority: Medium    Prediabetes 01/06/2022     Priority: Medium    Paroxysmal atrial fibrillation (H) 12/03/2020     Priority: Medium    Mild concentric left ventricular hypertrophy (LVH) 01/24/2018     Priority: Medium     Noted on Echo on 1/24/2018      Hypothyroidism due to acquired atrophy of thyroid 01/23/2018     Priority: Medium    Urinary urgency 09/13/2012     Priority: Medium    Atrophic vaginitis 09/13/2012     Priority: Medium    Urinary frequency 09/13/2012     Priority: Medium    Urge incontinence 09/13/2012     Priority: Medium    Advanced directives, counseling/discussion 08/07/2012     Priority: Medium     Patient states has Advance Directive and will bring in a copy to clinic.   Patient states that she has a health care directive. Recommended that she makes a copy for her medical record.       Postmenopausal- LMP 2001 06/23/2011     Priority: Medium    Fibroid, uterine      Priority: Medium     not symptomatic      Ganglion cyst      Priority:  Medium     left palm        Past Medical History:   Diagnosis Date    Atrial fibrillation (H)     Chronic diastolic congestive heart failure (H)     Fibroid, uterine early 2000s    not symptomatic    Ganglion cyst     left palm    History of blood transfusion 6/4/85    Excessive bleeding during childbirth    Hypothyroidism due to acquired atrophy of thyroid 1/23/2018    Mild concentric left ventricular hypertrophy (LVH) 1/24/2018    Postmenopausal 6/23/2011    Primary osteoarthritis of left hip 11/20/2023     Past Surgical History:   Procedure Laterality Date    CARDIAC SURGERY      COLONOSCOPY  03/11/2010    moderate internal hemorrhoids- 10 yr f/u    COLONOSCOPY N/A 07/16/2021    Procedure: Colonoscopy, With Polypectomy And Biopsy;  Surgeon: Kirsten Molina MD;  Location: [facility] OR    COLONOSCOPY WITH CO2 INSUFFLATION N/A 07/16/2021    Procedure: COLONOSCOPY, WITH CO2 INSUFFLATION;  Surgeon: Kirsten Molina MD;  Location: [facility] OR    EYE SURGERY  10/17/2017    Cateract removal    HC CORRECT BUNION,DOUBLE OSTEOTOMY  12/08/1998    Left foot bunion removal    HC HYSTEROSCOPY W ENDOMETRIAL BX/POLYPECTOMY W/WO D&C  12/21/2004    HC LAPAROSCOPY, SURGICAL; CHOLECYSTECTOMY  02/14/2005    ZZC REPAIR CRUCIATE LIGAMENT,KNEE  1996    Left knee     Current Outpatient Medications   Medication Sig Dispense Refill    atorvastatin (LIPITOR) 10 MG tablet Take 1 tablet (10 mg) by mouth daily 90 tablet 3    ELIQUIS ANTICOAGULANT 5 MG tablet TAKE ONE TABLET BY MOUTH TWICE A  tablet 3    levothyroxine (SYNTHROID/LEVOTHROID) 75 MCG tablet TAKE ONE TABLET BY MOUTH ONCE DAILY 90 tablet 3    metoprolol succinate ER (TOPROL XL) 100 MG 24 hr tablet Take 1 tablet (100 mg) by mouth 2 times daily 180 tablet 2    metoprolol succinate ER (TOPROL XL) 25 MG 24 hr tablet Take 1 tablet by mouth 2 times daily      Omega-3 Fatty Acids (FISH OIL PO) Take  by mouth.      oxyBUTYnin (DITROPAN) 5 MG tablet Take 1 tablet (5 mg) by mouth daily 90  "tablet 3    sotalol (BETAPACE) 160 MG tablet Take 1 tablet by mouth 2 times daily         Allergies   Allergen Reactions    No Known Drug Allergy         Social History     Tobacco Use    Smoking status: Never    Smokeless tobacco: Never   Substance Use Topics    Alcohol use: Yes     Comment: 3.5% beer       History   Drug Use No         Review of Systems    Review of Systems  CONSTITUTIONAL: NEGATIVE for fever, chills, change in weight  INTEGUMENTARY/SKIN: NEGATIVE for worrisome rashes, moles or lesions  EYES: NEGATIVE for vision changes or irritation  ENT/MOUTH: NEGATIVE for ear, mouth and throat problems  RESP: NEGATIVE for significant cough or SOB  CV: NEGATIVE for chest pain, palpitations or peripheral edema  CV: POSITIVE for a fib , pacemaker for sick sinus syndrome  GI: NEGATIVE for nausea, abdominal pain, heartburn, or change in bowel habits  : NEGATIVE for frequency, dysuria, or hematuria  MUSCULOSKELETAL:positive for left hip pain  NEURO: NEGATIVE for weakness, dizziness or paresthesias  ENDOCRINE: NEGATIVE for temperature intolerance, skin/hair changes  HEME: NEGATIVE for bleeding problems  PSYCHIATRIC: NEGATIVE for changes in mood or affect  Objective    /80   Pulse 81   Temp 97  F (36.1  C) (Temporal)   Resp 16   Ht 1.58 m (5' 2.21\")   Wt 93.9 kg (207 lb)   LMP 10/31/2004 (Exact Date)   SpO2 96%   BMI 37.61 kg/m     Estimated body mass index is 37.61 kg/m  as calculated from the following:    Height as of this encounter: 1.58 m (5' 2.21\").    Weight as of this encounter: 93.9 kg (207 lb).  Physical Exam  GENERAL: alert and no distress  EYES: Eyes grossly normal to inspection, PERRL and conjunctivae and sclerae normal  HENT: ear canals and TM's normal, nose and mouth without ulcers or lesions  NECK: no adenopathy, no asymmetry, masses, or scars  RESP: lungs clear to auscultation - no rales, rhonchi or wheezes  CV: regular rate and rhythm, normal S1 S2, no S3 or S4, no murmur, click or " rub, no peripheral edema  ABDOMEN: soft, nontender, no hepatosplenomegaly, no masses and bowel sounds normal  MS: no gross musculoskeletal defects noted, no edema  MS: Walks with a limp and uses cane for ambulation is able to get on exam table unassisted  NEURO: Normal strength and tone, mentation intact and speech normal  PSYCH: mentation appears normal, affect normal/bright    Recent Labs   Lab Test 11/08/23  1032 02/24/23  1046 12/19/22  1141 04/14/22  1028   HGB  --  14.8  --   --    PLT  --  205  --   --      --  140  --    POTASSIUM 4.6  --  4.3  --    CR 0.68  --  0.75  --    A1C  --   --   --  5.7*        Diagnostics  Labs pending at this time.  Results will be reviewed when available.  Recent Results (from the past 24 hour(s))   CBC with platelets    Collection Time: 02/14/24  4:04 PM   Result Value Ref Range    WBC Count 5.8 4.0 - 11.0 10e3/uL    RBC Count 4.92 3.80 - 5.20 10e6/uL    Hemoglobin 14.8 11.7 - 15.7 g/dL    Hematocrit 43.7 35.0 - 47.0 %    MCV 89 78 - 100 fL    MCH 30.1 26.5 - 33.0 pg    MCHC 33.9 31.5 - 36.5 g/dL    RDW 12.1 10.0 - 15.0 %    Platelet Count 257 150 - 450 10e3/uL   UA Macroscopic with reflex to Microscopic and Culture - Lab Collect    Collection Time: 02/14/24  4:14 PM    Specimen: Urine, NOS   Result Value Ref Range    Color Urine Yellow Colorless, Straw, Light Yellow, Yellow    Appearance Urine Clear Clear    Glucose Urine Negative Negative mg/dL    Bilirubin Urine Negative Negative    Ketones Urine Negative Negative mg/dL    Specific Gravity Urine 1.015 1.003 - 1.035    Blood Urine Negative Negative    pH Urine 7.0 5.0 - 7.0    Protein Albumin Urine Negative Negative mg/dL    Urobilinogen Urine 0.2 0.2, 1.0 E.U./dL    Nitrite Urine Negative Negative    Leukocyte Esterase Urine Negative Negative      No EKG this visit, completed in the last 90 days.  See image on care everywhere through [facility], most recent echocardiogram was from June 2023 showed normal left  ventricular function and EF is 59%, abnormal septal motion due to pacemaker    Revised Cardiac Risk Index (RCRI)  The patient has the following serious cardiovascular risks for perioperative complications:   - No serious cardiac risks = 0 points   Does have a history of A-fib and has a pacemaker    RCRI Interpretation: 0 points: Class I (very low risk - 0.4% complication rate)         Signed Electronically by: Darby Odom PA-C  Copy of this evaluation report is provided to requesting physician.

## 2024-02-15 LAB
ANION GAP SERPL CALCULATED.3IONS-SCNC: 11 MMOL/L (ref 7–15)
BUN SERPL-MCNC: 15.2 MG/DL (ref 8–23)
CALCIUM SERPL-MCNC: 10.1 MG/DL (ref 8.8–10.2)
CHLORIDE SERPL-SCNC: 102 MMOL/L (ref 98–107)
CREAT SERPL-MCNC: 0.77 MG/DL (ref 0.51–0.95)
DEPRECATED HCO3 PLAS-SCNC: 27 MMOL/L (ref 22–29)
EGFRCR SERPLBLD CKD-EPI 2021: 82 ML/MIN/1.73M2
GLUCOSE SERPL-MCNC: 100 MG/DL (ref 70–99)
POTASSIUM SERPL-SCNC: 4.4 MMOL/L (ref 3.4–5.3)
SODIUM SERPL-SCNC: 140 MMOL/L (ref 135–145)
TSH SERPL DL<=0.005 MIU/L-ACNC: 0.59 UIU/ML (ref 0.3–4.2)

## 2024-02-29 ENCOUNTER — OFFICE VISIT (OUTPATIENT)
Dept: FAMILY MEDICINE | Facility: CLINIC | Age: 74
End: 2024-02-29
Attending: FAMILY MEDICINE
Payer: MEDICARE

## 2024-02-29 VITALS
DIASTOLIC BLOOD PRESSURE: 80 MMHG | TEMPERATURE: 97.3 F | RESPIRATION RATE: 17 BRPM | HEIGHT: 62 IN | OXYGEN SATURATION: 96 % | SYSTOLIC BLOOD PRESSURE: 132 MMHG | BODY MASS INDEX: 38.55 KG/M2 | HEART RATE: 85 BPM | WEIGHT: 209.5 LBS

## 2024-02-29 DIAGNOSIS — I48.0 PAROXYSMAL ATRIAL FIBRILLATION (H): ICD-10-CM

## 2024-02-29 DIAGNOSIS — Z00.00 ENCOUNTER FOR MEDICARE ANNUAL WELLNESS EXAM: Primary | ICD-10-CM

## 2024-02-29 DIAGNOSIS — E78.5 HYPERLIPIDEMIA LDL GOAL <100: ICD-10-CM

## 2024-02-29 DIAGNOSIS — I51.7 MILD CONCENTRIC LEFT VENTRICULAR HYPERTROPHY (LVH): ICD-10-CM

## 2024-02-29 DIAGNOSIS — Z12.31 ENCOUNTER FOR SCREENING MAMMOGRAM FOR MALIGNANT NEOPLASM OF BREAST: ICD-10-CM

## 2024-02-29 DIAGNOSIS — R73.9 HYPERGLYCEMIA, UNSPECIFIED: ICD-10-CM

## 2024-02-29 DIAGNOSIS — E03.4 HYPOTHYROIDISM DUE TO ACQUIRED ATROPHY OF THYROID: ICD-10-CM

## 2024-02-29 DIAGNOSIS — Z96.642 S/P TOTAL LEFT HIP ARTHROPLASTY: ICD-10-CM

## 2024-02-29 DIAGNOSIS — E66.01 MORBID OBESITY (H): ICD-10-CM

## 2024-02-29 DIAGNOSIS — R73.03 PREDIABETES: ICD-10-CM

## 2024-02-29 LAB
ALBUMIN SERPL BCG-MCNC: 4 G/DL (ref 3.5–5.2)
ALP SERPL-CCNC: 65 U/L (ref 40–150)
ALT SERPL W P-5'-P-CCNC: 33 U/L (ref 0–50)
AST SERPL W P-5'-P-CCNC: 22 U/L (ref 0–45)
BILIRUB DIRECT SERPL-MCNC: <0.2 MG/DL (ref 0–0.3)
BILIRUB SERPL-MCNC: 0.4 MG/DL
CREAT UR-MCNC: 80.5 MG/DL
HBA1C MFR BLD: 5.6 % (ref 0–5.6)
MICROALBUMIN UR-MCNC: <12 MG/L
MICROALBUMIN/CREAT UR: NORMAL MG/G{CREAT}
PROT SERPL-MCNC: 6.6 G/DL (ref 6.4–8.3)

## 2024-02-29 PROCEDURE — G0439 PPPS, SUBSEQ VISIT: HCPCS | Performed by: FAMILY MEDICINE

## 2024-02-29 PROCEDURE — 83036 HEMOGLOBIN GLYCOSYLATED A1C: CPT | Performed by: FAMILY MEDICINE

## 2024-02-29 PROCEDURE — 36415 COLL VENOUS BLD VENIPUNCTURE: CPT | Performed by: FAMILY MEDICINE

## 2024-02-29 PROCEDURE — 80076 HEPATIC FUNCTION PANEL: CPT | Performed by: FAMILY MEDICINE

## 2024-02-29 PROCEDURE — 82570 ASSAY OF URINE CREATININE: CPT | Performed by: FAMILY MEDICINE

## 2024-02-29 PROCEDURE — 99214 OFFICE O/P EST MOD 30 MIN: CPT | Mod: 25 | Performed by: FAMILY MEDICINE

## 2024-02-29 PROCEDURE — 82043 UR ALBUMIN QUANTITATIVE: CPT | Performed by: FAMILY MEDICINE

## 2024-02-29 RX ORDER — RESPIRATORY SYNCYTIAL VIRUS VACCINE 120MCG/0.5
0.5 KIT INTRAMUSCULAR ONCE
Qty: 1 EACH | Refills: 0 | Status: CANCELLED | OUTPATIENT
Start: 2024-02-29 | End: 2024-02-29

## 2024-02-29 RX ORDER — OXYCODONE HYDROCHLORIDE 5 MG/1
TABLET ORAL
COMMUNITY
End: 2024-07-19

## 2024-02-29 RX ORDER — ATORVASTATIN CALCIUM 10 MG/1
10 TABLET, FILM COATED ORAL DAILY
Qty: 90 TABLET | Refills: 3 | Status: SHIPPED | OUTPATIENT
Start: 2024-02-29

## 2024-02-29 NOTE — RESULT ENCOUNTER NOTE
Seth Rebolledo,    If you have not viewed these results on [employer] within 3 days, we will use an alternative method to contact you. We will contact you via the following protocol:    - Via letter if your results are normal.  - Via phone ([phone]) if your results are abnormal.     Here are my comments about your recent results:    A1c - Your 3 month blood sugar average was normal.    Please call the clinic ([phone]), or message us on [employer] with any questions you may have.     Have a great day,    Dr. Jones

## 2024-02-29 NOTE — PATIENT INSTRUCTIONS
Important Takeaway Points From This Visit:   Try Senna once daily  Continue the dulcolax/colace  If needed add a cap of miralax daily until having 1 soft stool daily.    Preventive Care Advice   This is general advice given by our system to help you stay healthy. However, your care team may have specific advice just for you. Please talk to your care team about your preventive care needs.  Nutrition  Eat 5 or more servings of fruits and vegetables each day.  Try wheat bread, brown rice and whole grain pasta (instead of white bread, rice, and pasta).  Get enough calcium and vitamin D. Check the label on foods and aim for 100% of the RDA (recommended daily allowance).  Lifestyle  Exercise at least 150 minutes each week   (30 minutes a day, 5 days a week).  Do muscle strengthening activities 2 days a week. These help control your weight and prevent disease.  No smoking.  Wear sunscreen to prevent skin cancer.  Have a dental exam and cleaning every 6 months.  Yearly exams  See your health care team every year to talk about:  Any changes in your health.  Any medicines your care team has prescribed.  Preventive care, family planning, and ways to prevent chronic diseases.  Shots (vaccines)   HPV shots (up to age 26), if you've never had them before.  Hepatitis B shots (up to age 59), if you've never had them before.  COVID-19 shot: Get this shot when it's due.  Flu shot: Get a flu shot every year.  Tetanus shot: Get a tetanus shot every 10 years.  Pneumococcal, hepatitis A, and RSV shots: Ask your care team if you need these based on your risk.  Shingles shot (for age 50 and up).  General health tests  Diabetes screening:  Starting at age 35, Get screened for diabetes at least every 3 years.  If you are younger than age 35, ask your care team if you should be screened for diabetes.  Cholesterol test: At age 39, start having a cholesterol test every 5 years, or more often if advised.  Bone density scan (DEXA): At age 50, ask  your care team if you should have this scan for osteoporosis (brittle bones).  Hepatitis C: Get tested at least once in your life.  STIs (sexually transmitted infections)  Before age 24: Ask your care team if you should be screened for STIs.  After age 24: Get screened for STIs if you're at risk. You are at risk for STIs (including HIV) if:  You are sexually active with more than one person.  You don't use condoms every time.  You or a partner was diagnosed with a sexually transmitted infection.  If you are at risk for HIV, ask about PrEP medicine to prevent HIV.  Get tested for HIV at least once in your life, whether you are at risk for HIV or not.  Cancer screening tests  Cervical cancer screening: If you have a cervix, begin getting regular cervical cancer screening tests at age 21. Most people who have regular screenings with normal results can stop after age 65. Talk about this with your provider.  Breast cancer scan (mammogram): If you've ever had breasts, begin having regular mammograms starting at age 40. This is a scan to check for breast cancer.  Colon cancer screening: It is important to start screening for colon cancer at age 45.  Have a colonoscopy test every 10 years (or more often if you're at risk) Or, ask your provider about stool tests like a FIT test every year or Cologuard test every 3 years.  To learn more about your testing options, visit: https://[email or web address].pdf.  For help making a decision, visit: https://bit.ly/il36013.  Prostate cancer screening test: If you have a prostate and are age 55 to 69, ask your provider if you would benefit from a yearly prostate cancer screening test.  Lung cancer screening: If you are a current or former smoker age 50 to 80, ask your care team if ongoing lung cancer screenings are right for you.  For informational purposes only. Not to replace the advice of your health care provider. Copyright   2023 [employer]. All rights reserved.  Clinically reviewed by the [facility]. [employer] [id number] - REV 01/24.    Learning About Activities of Daily Living  What are activities of daily living?     Activities of daily living (ADLs) are the basic self-care tasks you do every day. As you age, and if you have health problems, you may find that it's harder to do these things for yourself. That's when you may need some help.  Your doctor uses ADLs to measure how much help you need. Knowing what you can and can't do for yourself is an important first step to getting help. And when you have the help you need, you can stay as independent as possible.  Your doctor will want to know if you are able to do tasks such as:  Take a bath or shower without help.  Go to the bathroom by yourself.  Dress and undress without help.  Shave, comb your hair, and brush teeth on your own.  Get in and out of bed or a chair without help.  Feed yourself without help.  If you are having trouble doing basic self-care tasks, talk with your doctor. You may want to bring a caregiver or family member who can help the doctor understand your needs and abilities.  How will a doctor assess your ADLs?  Asking about ADLs is part of a routine health checkup your doctor will likely do as you age. Your health check might be done in a doctor's office, in your home, or at a hospital. The goal is to find out if you are having any problems that could make your health problems worse or that make it unsafe for you to be on your own.  To measure your ADLs, your doctor will ask how hard it is for you to do routine tasks. He or she may also want to know if you have changed the way you do a task because of a health problem. He or she may watch how you:  Walk back and forth.  Keep your balance while you stand or walk.  Move from sitting to standing or from a bed to a chair.  Button or unbutton a shirt or sweater.  Remove and put on your shoes.  It's normal to feel a little worried or  anxious if you find you can't do all the things you used to be able to do. Talking with your doctor about ADLs isn't a test that you either pass or fail. It's just a way to get more information about your health and safety.  Follow-up care is a key part of your treatment and safety. Be sure to make and go to all appointments, and call your doctor if you are having problems. It's also a good idea to know your test results and keep a list of the medicines you take.  Current as of: February 26, 2023               Content Version: 13.8    3464-7752 [employer].   Care instructions adapted under license by your healthcare professional. If you have questions about a medical condition or this instruction, always ask your healthcare professional. [employer] disclaims any warranty or liability for your use of this information.      Preventing Falls: Care Instructions  Injuries and health problems such as trouble walking or poor eyesight can increase your risk of falling. So can some medicines. But there are things you can do to help prevent falls. You can exercise to get stronger. You can also arrange your home to make it safer.    Talk to your doctor about the medicines you take. Ask if any of them increase the risk of falls and whether they can be changed or stopped.   Try to exercise regularly. It can help improve your strength and balance. This can help lower your risk of falling.     Practice fall safety and prevention.    Wear low-heeled shoes that fit well and give your feet good support. Talk to your doctor if you have foot problems that make this hard.  Carry a cellphone or wear a medical alert device that you can use to call for help.  Use stepladders instead of chairs to reach high objects. Don't climb if you're at risk for falls. Ask for help, if needed.  Wear the correct eyeglasses, if you need them.    Make your home safer.    Remove rugs, cords, clutter, and furniture from  "walkways.  Keep your house well lit. Use night-lights in hallways and bathrooms.  Install and use sturdy handrails on stairways.  Wear nonskid footwear, even inside. Don't walk barefoot or in socks without shoes.    Be safe outside.    Use handrails, curb cuts, and ramps whenever possible.  Keep your hands free by using a shoulder bag or backpack.  Try to walk in well-lit areas. Watch out for uneven ground, changes in pavement, and debris.  Be careful in the winter. Walk on the grass or gravel when sidewalks are slippery. Use de-icer on steps and walkways. Add non-slip devices to shoes.    Put grab bars and nonskid mats in your shower or tub and near the toilet. Try to use a shower chair or bath bench when bathing.   Get into a tub or shower by putting in your weaker leg first. Get out with your strong side first. Have a phone or medical alert device in the bathroom with you.   Where can you learn more?  Go to https://www.[employer].net/patiented  Enter G117 in the search box to learn more about \"Preventing Falls: Care Instructions.\"  Current as of: July 18, 2023               Content Version: 13.8    9399-2386 [employer].   Care instructions adapted under license by your healthcare professional. If you have questions about a medical condition or this instruction, always ask your healthcare professional. [employer] disclaims any warranty or liability for your use of this information.      Learning About Stress  What is stress?     Stress is your body's response to a hard situation. Your body can have a physical, emotional, or mental response. Stress is a fact of life for most people, and it affects everyone differently. What causes stress for you may not be stressful for someone else.  A lot of things can cause stress. You may feel stress when you go on a job interview, take a test, or run a race. This kind of short-term stress is normal and even useful. It can help you if you need to " work hard or react quickly. For example, stress can help you finish an important job on time.  Long-term stress is caused by ongoing stressful situations or events. Examples of long-term stress include long-term health problems, ongoing problems at work, or conflicts in your family. Long-term stress can harm your health.  How does stress affect your health?  When you are stressed, your body responds as though you are in danger. It makes hormones that speed up your heart, make you breathe faster, and give you a burst of energy. This is called the fight-or-flight stress response. If the stress is over quickly, your body goes back to normal and no harm is done.  But if stress happens too often or lasts too long, it can have bad effects. Long-term stress can make you more likely to get sick, and it can make symptoms of some diseases worse. If you tense up when you are stressed, you may develop neck, shoulder, or low back pain. Stress is linked to high blood pressure and heart disease.  Stress also harms your emotional health. It can make you cai, tense, or depressed. Your relationships may suffer, and you may not do well at work or school.  What can you do to manage stress?  You can try these things to help manage stress:   Do something active. Exercise or activity can help reduce stress. Walking is a great way to get started. Even everyday activities such as housecleaning or yard work can help.  Try yoga or josh chi. These techniques combine exercise and meditation. You may need some training at first to learn them.  Do something you enjoy. For example, listen to music or go to a movie. Practice your hobby or do volunteer work.  Meditate. This can help you relax, because you are not worrying about what happened before or what may happen in the future.  Do guided imagery. Imagine yourself in any setting that helps you feel calm. You can use online videos, books, or a teacher to guide you.  Do breathing exercises. For  "example:  From a standing position, bend forward from the waist with your knees slightly bent. Let your arms dangle close to the floor.  Breathe in slowly and deeply as you return to a standing position. Roll up slowly and lift your head last.  Hold your breath for just a few seconds in the standing position.  Breathe out slowly and bend forward from the waist.  Let your feelings out. Talk, laugh, cry, and express anger when you need to. Talking with supportive friends or family, a counselor, or a chantell leader about your feelings is a healthy way to relieve stress. Avoid discussing your feelings with people who make you feel worse.  Write. It may help to write about things that are bothering you. This helps you find out how much stress you feel and what is causing it. When you know this, you can find better ways to cope.  What can you do to prevent stress?  You might try some of these things to help prevent stress:  Manage your time. This helps you find time to do the things you want and need to do.  Get enough sleep. Your body recovers from the stresses of the day while you are sleeping.  Get support. Your family, friends, and community can make a difference in how you experience stress.  Limit your news feed. Avoid or limit time on social media or news that may make you feel stressed.  Do something active. Exercise or activity can help reduce stress. Walking is a great way to get started.  Where can you learn more?  Go to https://www.[employer].net/patiented  Enter N032 in the search box to learn more about \"Learning About Stress.\"  Current as of: February 26, 2023               Content Version: 13.8    2785-1270 [employer].   Care instructions adapted under license by your healthcare professional. If you have questions about a medical condition or this instruction, always ask your healthcare professional. [employer] disclaims any warranty or liability for your use of this " information.      Bladder Training: Care Instructions  Your Care Instructions     Bladder training is used to treat urge incontinence and stress incontinence. Urge incontinence means that the need to urinate comes on so fast that you can't get to a toilet in time. Stress incontinence means that you leak urine because of pressure on your bladder. For example, it may happen when you laugh, cough, or lift something heavy.  Bladder training can increase how long you can wait before you have to urinate. It can also help your bladder hold more urine. And it can give you better control over the urge to urinate.  It is important to remember that bladder training takes a few weeks to a few months to make a difference. You may not see results right away, but don't give up.  Follow-up care is a key part of your treatment and safety. Be sure to make and go to all appointments, and call your doctor if you are having problems. It's also a good idea to know your test results and keep a list of the medicines you take.  How can you care for yourself at home?  Work with your doctor to come up with a bladder training program that is right for you. You may use one or more of the following methods.  Delayed urination  In the beginning, try to keep from urinating for 5 minutes after you first feel the need to go.  While you wait, take deep, slow breaths to relax. Kegel exercises can also help you delay the need to go to the bathroom.  After some practice, when you can easily wait 5 minutes to urinate, try to wait 10 minutes before you urinate.  Slowly increase the waiting period until you are able to control when you have to urinate.  Scheduled urination  Empty your bladder when you first wake up in the morning.  Schedule times throughout the day when you will urinate.  Start by going to the bathroom every hour, even if you don't need to go.  Slowly increase the time between trips to the bathroom.  When you have found a schedule that  "works well for you, keep doing it.  If you wake up during the night and have to urinate, do it. Apply your schedule to waking hours only.  Kegel exercises  These tighten and strengthen pelvic muscles, which can help you control the flow of urine. (If doing these exercises causes pain, stop doing them and talk with your doctor.) To do Kegel exercises:  Squeeze your muscles as if you were trying not to pass gas. Or squeeze your muscles as if you were stopping the flow of urine. Your belly, legs, and buttocks shouldn't move.  Hold the squeeze for 3 seconds, then relax for 5 to 10 seconds.  Start with 3 seconds, then add 1 second each week until you are able to squeeze for 10 seconds.  Repeat the exercise 10 times a session. Do 3 to 8 sessions a day.  When should you call for help?  Watch closely for changes in your health, and be sure to contact your doctor if:    Your incontinence is getting worse.     You do not get better as expected.   Where can you learn more?  Go to https://www.[employer].net/patiented  Enter V684 in the search box to learn more about \"Bladder Training: Care Instructions.\"  Current as of: February 28, 2023               Content Version: 13.8    4387-9837 [employer].   Care instructions adapted under license by your healthcare professional. If you have questions about a medical condition or this instruction, always ask your healthcare professional. [employer] disclaims any warranty or liability for your use of this information.      Chronic Pain: Care Instructions  Your Care Instructions     Chronic pain is pain that lasts a long time (months or even years) and may or may not have a clear cause. It is different from acute pain, which usually does have a clear cause--like an injury or illness--and gets better over time. Chronic pain:  Lasts over time but may vary from day to day.  Does not go away despite efforts to end it.  May disrupt your sleep and lead to " fatigue.  May cause depression or anxiety.  May make your muscles tense, causing more pain.  Can disrupt your work, hobbies, home life, and relationships with friends and family.  Chronic pain is a very real condition. It is not just in your head. Treatment can help and usually includes several methods used together, such as medicines, physical therapy, exercise, and other treatments. Learning how to relax and changing negative thought patterns can also help you cope.  Chronic pain is complex. Taking an active role in your treatment will help you better manage your pain. Tell your doctor if you have trouble dealing with your pain. You may have to try several things before you find what works best for you.  Follow-up care is a key part of your treatment and safety. Be sure to make and go to all appointments, and call your doctor if you are having problems. It's also a good idea to know your test results and keep a list of the medicines you take.  How can you care for yourself at home?  Pace yourself. Break up large jobs into smaller tasks. Save harder tasks for days when you have less pain, or go back and forth between hard tasks and easier ones. Take rest breaks.  Relax, and reduce stress. Relaxation techniques such as deep breathing or meditation can help.  Keep moving. Gentle, daily exercise can help reduce pain over the long run. Try low- or no-impact exercises such as walking, swimming, and stationary biking. Do stretches to stay flexible.  Try heat, cold packs, and massage.  Get enough sleep. Chronic pain can make you tired and drain your energy. Talk with your doctor if you have trouble sleeping because of pain.  Think positive. Your thoughts can affect your pain level. Do things that you enjoy to distract yourself when you have pain instead of focusing on the pain. See a movie, read a book, listen to music, or spend time with a friend.  If you think you are depressed, talk to your doctor about  treatment.  Keep a daily pain diary. Record how your moods, thoughts, sleep patterns, activities, and medicine affect your pain. You may find that your pain is worse during or after certain activities or when you are feeling a certain emotion. Having a record of your pain can help you and your doctor find the best ways to treat your pain.  Take pain medicines exactly as directed.  If the doctor gave you a prescription medicine for pain, take it as prescribed.  If you are not taking a prescription pain medicine, ask your doctor if you can take an over-the-counter medicine.  Reducing constipation caused by pain medicine  Talk to your doctor about a laxative. If a laxative doesn't work, your doctor may suggest a prescription medicine.  Include fruits, vegetables, beans, and whole grains in your diet each day. These foods are high in fiber.  If your doctor recommends it, get more exercise. Walking is a good choice. Bit by bit, increase the amount you walk every day. Try for at least 30 minutes on most days of the week.  Schedule time each day for a bowel movement. A daily routine may help. Take your time and do not strain when having a bowel movement.  When should you call for help?   Call your doctor now or seek immediate medical care if:    Your pain gets worse or is out of control.     You feel down or blue, or you do not enjoy things like you once did. You may be depressed, which is common in people with chronic pain. Depression can be treated.     You have vomiting or cramps for more than 2 hours.   Watch closely for changes in your health, and be sure to contact your doctor if:    You cannot sleep because of pain.     You are very worried or anxious about your pain.     You have trouble taking your pain medicine.     You have any concerns about your pain medicine.     You have trouble with bowel movements, such as:  No bowel movement in 3 days.  Blood in the anal area, in your stool, or on the toilet  "paper.  Diarrhea for more than 24 hours.   Where can you learn more?  Go to https://www.[employer].net/patiented  Enter N004 in the search box to learn more about \"Chronic Pain: Care Instructions.\"  Current as of: July 11, 2023               Content Version: 13.8    0769-8735 [employer].   Care instructions adapted under license by your healthcare professional. If you have questions about a medical condition or this instruction, always ask your healthcare professional. [employer] disclaims any warranty or liability for your use of this information.      "

## 2024-02-29 NOTE — PROGRESS NOTES
Preventive Care Visit  [facility] YONI Mccollum MD, Family Medicine  Feb 29, 2024    Assessment & Plan   1. Encounter for Medicare annual wellness exam  Discussed personal health and safety. Routine screenings ordered as below. Appropriate anticipatory guidance, vaccinations, and health screening recommendations delivered according to the USPSTF and other appropriate society guidelines. Kesha reports understanding and in agreement with this mutually agreed upon.    Today's Orders:  - PRIMARY CARE FOLLOW-UP SCHEDULING  - REVIEW OF HEALTH MAINTENANCE PROTOCOL ORDERS  - PRIMARY CARE FOLLOW-UP SCHEDULING; Future  - REVIEW OF HEALTH MAINTENANCE PROTOCOL ORDERS  - Hemoglobin A1c; Future  - Hepatic panel (Albumin, ALT, AST, Bili, Alk Phos, TP); Future  - Hemoglobin A1c  - Hepatic panel (Albumin, ALT, AST, Bili, Alk Phos, TP)  - PRIMARY CARE FOLLOW-UP SCHEDULING; Future    2. S/P total left hip arthroplasty  Healing incision on inspection today. Small area still open with serious drainage distal end of incisions. Bacitracin and Tegaderm applied for wound monitoring. Follow-up with ortho. Updated medication list. Discussed bowel regiment.  - oxyCODONE (ROXICODONE) 5 MG tablet; TAKE ONE TABLET BY MOUTH EVERY 4 HOURS AS NEEDED FOR PAIN *CAUTION: OPIOID. RISK OF OVERDOSE AND ADDICTION.    3. Paroxysmal atrial fibrillation (H)  4. Mild concentric left ventricular hypertrophy (LVH)  Follow up with cardiology.  - apixaban ANTICOAGULANT (ELIQUIS ANTICOAGULANT) 5 MG tablet; Take 1 tablet (5 mg) by mouth 2 times daily  Dispense: 180 tablet; Refill: 3    5. Hyperlipidemia LDL goal <100  A comprehensive plan was outlined to address the Kesha's hyperlipidemia. Emphasis was placed on lifestyle modifications as the first line of therapy, including adopting a healthy diet with a focus on limiting animal-based foods and foods high in saturated fats, while increasing the percentage of plant-based sources in  "meals. Regular structured exercise was also recommended (30 minutes, 5x weekly).     As a secondary measure, to further manage her hyperlipidemia, the initiation of a statin was recommended. This recommendation is based on the ACC/AHA guideline indication of primary prevention (ASCVD calculated risk >7.5%).     The 10-year ASCVD risk score (Erick HANDY, et al., 2019) is: 11.8%    Values used to calculate the score:      Age: 73 years      Sex: Female      Is Non- : No      Diabetic: No      Tobacco smoker: No      Systolic Blood Pressure: 132 mmHg      Is BP treated: No      HDL Cholesterol: 65 mg/dL      Total Cholesterol: 168 mg/dL    We had a detailed discussion of the medication mechanism of action, benefits including lowering cholesterol and reducing cardiovascular risk (NNT ~40), balanced with potential risks. Common side effects of statins were explained, including muscle pain and liver enzyme elevation.     Kesha agreed with the proposed plan, demonstrating an understanding of the importance of medication adherence and lifestyle changes.     A follow-up \"Lab Only\" visit in 3 months was advised to recheck fasting cholesterol levels after starting the medication. Discussed subsequent monitoring during their annual physical exam. This integrated approach aims to optimize lipid management while ensuring the Hayden overall cardiovascular health.    Today's Orders:  - atorvastatin (LIPITOR) 10 MG tablet; Take 1 tablet (10 mg) by mouth daily  Dispense: 90 tablet; Refill: 3    6. Hypothyroidism due to acquired atrophy of thyroid  Up to date on labs.    7. Hyperglycemia, unspecified  Monitor yearly A1c. Encourage weight loss and healthy diet.  8. Prediabetes  - Hemoglobin A1c; Future  - Albumin Random Urine Quantitative with Creat Ratio    9. Morbid obesity (H)  Noted. Encourage diet and exercise changes for weight loss and overall health improvement.    10. Encounter for screening mammogram " for malignant neoplasm of breast  - MA Screen Bilateral w/Scott; Future       Follow-up Visit   Expected date:  Feb 28, 2025 (Approximate)      Follow Up Appointment Details:     Follow-up with whom?: Me    Follow-Up for what?: Medicare Wellness    Welcome or Annual?: Annual Wellness    How?: In Person    Is this an as-needed follow-up?: No             Follow-up Visit   Expected date:  Mar 07, 2025 (Approximate)      Follow Up Appointment Details:     Follow-up with whom?: PCP    Follow-Up for what?: Medicare Wellness    Welcome or Annual?: Annual Wellness    How?: In Person                   Andrew Mccollum MD  [facility]    Disclaimer: This note consists of symbols derived from keyboarding, dictation and/or voice recognition software. As a result, there may be errors in the script that have gone undetected. Please consider this when interpreting information found in this chart.    Daphney Rebolledo is a 73 year old, presenting for the following:  Wellness Visit and Surgical Followup        2/29/2024     9:43 AM   Additional Questions   Roomed by [facility]       Health Care Directive  Patient does not have a Health Care Directive or Living Will: Discussed advance care planning with patient; however, patient declined at this time.    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 They are managing sotalol and toprol. Recommended follow-up with EP provider in 2-3 months. On Eliquis.    Hx of hypothyroidism, controlled.        2/22/2024   General Health   How would you rate your overall physical health? Good   Feel stress (tense, anxious, or unable to sleep) Only a little   (!) STRESS CONCERN      2/22/2024   Nutrition   Diet: Low salt    Carbohydrate counting         2/22/2024   Exercise   Days per week of moderate/strenous exercise 3 days   Average minutes  spent exercising at this level 50 min         2/22/2024   Social Factors   Frequency of gathering with friends or relatives Once a week   Worry food won't last until get money to buy more No   Food not last or not have enough money for food? No   Do you have housing?  Yes   Are you worried about losing your housing? No   Lack of transportation? No   Unable to get utilities (heat,electricity)? No         2/29/2024   Fall Risk   Gait Speed Test (Document in seconds) 6.37   Gait Speed Test Interpretation Greater than 5.01 seconds - ABNORMAL          2/22/2024   Activities of Daily Living- Home Safety   Needs help with the following daily activites None of the above   Safety concerns in the home No grab bars in the bathroom         2/22/2024   Dental   Dentist two times every year? Yes         2/22/2024   Hearing Screening   Hearing concerns? None of the above         2/22/2024   Driving Risk Screening   Patient/family members have concerns about driving No         2/22/2024   General Alertness/Fatigue Screening   Have you been more tired than usual lately? No         2/22/2024   Urinary Incontinence Screening   Bothered by leaking urine in past 6 months Yes         2/22/2024   TB Screening   Were you born outside of [place]?  No         Today's PHQ-2 Score:       2/29/2024     9:39 AM   PHQ-2 ( 1999 [employer])   Q1: Little interest or pleasure in doing things 1   Q2: Feeling down, depressed or hopeless 1   PHQ-2 Score 2   Q1: Little interest or pleasure in doing things Several days   Q2: Feeling down, depressed or hopeless Several days   PHQ-2 Score 2           2/22/2024   Substance Use   Alcohol more than 3/day or more than 7/wk No   Do you have a current opioid prescription? (!) YES   How severe/bad is pain from 1 to 10? 5/10   Do you use any other substances recreationally? No     Social History     Tobacco Use    Smoking status: Never    Smokeless tobacco: Never   Vaping Use    Vaping Use: Never used   Substance Use Topics     Alcohol use: Yes     Comment: 3.5% beer    Drug use: No           3/30/2023   LAST FHS-7 RESULTS   1st degree relative breast or ovarian cancer No   Any relative bilateral breast cancer No   Any male have breast cancer No   Any ONE woman have BOTH breast AND ovarian cancer No   Any woman with breast cancer before 50yrs No   2 or more relatives with breast AND/OR ovarian cancer No   2 or more relatives with breast AND/OR bowel cancer No     Mammogram Screening - Mammogram every 1-2 years updated in Health Maintenance based on mutual decision making    ASCVD Risk   The 10-year ASCVD risk score (Erick HANDY, et al., 2019) is: 11.8%    Values used to calculate the score:      Age: 73 years      Sex: Female      Is Non- : No      Diabetic: No      Tobacco smoker: No      Systolic Blood Pressure: 132 mmHg      Is BP treated: No      HDL Cholesterol: 65 mg/dL      Total Cholesterol: 168 mg/dL      Reviewed and updated as needed this visit by Provider   Tobacco  Allergies  Meds  Problems  Med Hx  Surg Hx  Fam Hx        Social Hx Reviewed    Past Medical History:   Diagnosis Date    Atrial fibrillation (H)     Chronic diastolic congestive heart failure (H)     Fibroid, uterine early 2000s    not symptomatic    Ganglion cyst     left palm    History of blood transfusion 6/4/85    Excessive bleeding during childbirth    Hypothyroidism due to acquired atrophy of thyroid 1/23/2018    Mild concentric left ventricular hypertrophy (LVH) 1/24/2018    Postmenopausal 6/23/2011    Primary osteoarthritis of left hip 11/20/2023     Past Surgical History:   Procedure Laterality Date    CARDIAC SURGERY      COLONOSCOPY  03/11/2010    moderate internal hemorrhoids- 10 yr f/u    COLONOSCOPY N/A 07/16/2021    Procedure: Colonoscopy, With Polypectomy And Biopsy;  Surgeon: Kirsten Molina MD;  Location: [facility] OR    COLONOSCOPY WITH CO2 INSUFFLATION N/A 07/16/2021    Procedure: COLONOSCOPY, WITH CO2  INSUFFLATION;  Surgeon: Kirsten Molina MD;  Location: [facility] OR    EYE SURGERY  10/17/2017    Cateract removal    HC CORRECT BUNION,DOUBLE OSTEOTOMY  12/08/1998    Left foot bunion removal    HC HYSTEROSCOPY W ENDOMETRIAL BX/POLYPECTOMY W/WO D&C  12/21/2004     LAPAROSCOPY, SURGICAL; CHOLECYSTECTOMY  02/14/2005    ZZC REPAIR CRUCIATE LIGAMENT,KNEE  1996    Left knee     Current providers sharing in care for this patient include:  Patient Care Team:  Andrew Mccollum MD as PCP - General (Family Medicine)  Andrew Mccollum MD as Assigned PCP  Curly Cardenas MD as Assigned Musculoskeletal Provider    The following health maintenance items are reviewed in Epic and correct as of today:  Health Maintenance   Topic Date Due    HF ACTION PLAN  Never done    RSV VACCINE (Pregnancy & 60+) (1 - 1-dose 60+ series) Never done    COVID-19 Vaccine (7 - 2023-24 season) 09/01/2023    ALT  12/19/2023    MICROALBUMIN  02/24/2024    BMP  08/14/2024    LIPID  11/08/2024    CBC  02/14/2025    MEDICARE ANNUAL WELLNESS VISIT  02/28/2025    ANNUAL REVIEW OF HM ORDERS  02/28/2025    FALL RISK ASSESSMENT  02/28/2025    MAMMO SCREENING  03/30/2025    GLUCOSE  02/14/2027    ADVANCE CARE PLANNING  02/25/2028    COLORECTAL CANCER SCREENING  07/16/2028    DTAP/TDAP/TD IMMUNIZATION (3 - Td or Tdap) 11/03/2030    DEXA  10/11/2031    TSH W/FREE T4 REFLEX  Completed    HEPATITIS C SCREENING  Completed    PHQ-2 (once per calendar year)  Completed    INFLUENZA VACCINE  Completed    Pneumococcal Vaccine: 65+ Years  Completed    URINALYSIS  Completed    ZOSTER IMMUNIZATION  Completed    IPV IMMUNIZATION  Aged Out    HPV IMMUNIZATION  Aged Out    MENINGITIS IMMUNIZATION  Aged Out    RSV MONOCLONAL ANTIBODY  Aged Out         Review of Systems  Constitutional, HEENT, cardiovascular, pulmonary, GI, , musculoskeletal, neuro, skin, endocrine and psych systems are negative, except as otherwise noted.     Objective    Exam  /80    "Pulse 85   Temp 97.3  F (36.3  C) (Tympanic)   Resp 17   Ht 1.581 m (5' 2.25\")   Wt 95 kg (209 lb 8 oz)   LMP 10/31/2004 (Exact Date)   SpO2 96%   BMI 38.01 kg/m     Estimated body mass index is 38.01 kg/m  as calculated from the following:    Height as of this encounter: 1.581 m (5' 2.25\").    Weight as of this encounter: 95 kg (209 lb 8 oz).    Physical Exam  Constitutional:       Appearance: Normal appearance.   HENT:      Head: Normocephalic and atraumatic.      Right Ear: Tympanic membrane, ear canal and external ear normal. There is no impacted cerumen.      Left Ear: Tympanic membrane, ear canal and external ear normal. There is no impacted cerumen.      Nose: Nose normal. No congestion.      Mouth/Throat:      Mouth: Mucous membranes are moist.      Pharynx: Oropharynx is clear. No oropharyngeal exudate or posterior oropharyngeal erythema.   Eyes:      Extraocular Movements: Extraocular movements intact.      Conjunctiva/sclera: Conjunctivae normal.      Pupils: Pupils are equal, round, and reactive to light.   Cardiovascular:      Rate and Rhythm: Normal rate and regular rhythm.      Heart sounds: Normal heart sounds. No murmur heard.     No friction rub. No gallop.   Pulmonary:      Effort: No respiratory distress.      Breath sounds: No wheezing or rhonchi.   Abdominal:      General: Abdomen is flat. There is no distension.      Palpations: Abdomen is soft. There is no mass.      Tenderness: There is no abdominal tenderness. There is no guarding.   Musculoskeletal:         General: No swelling.      Cervical back: Normal range of motion and neck supple.        Legs:       Comments: Incision. Small opening with serious fluid drainage as shown.   Skin:     Findings: No rash.   Neurological:      General: No focal deficit present.      Mental Status: She is alert and oriented to person, place, and time.      Cranial Nerves: No cranial nerve deficit.      Sensory: No sensory deficit.      Motor: No " weakness.      Gait: Gait normal.   Psychiatric:         Mood and Affect: Mood normal.         Behavior: Behavior normal.         Thought Content: Thought content normal.         Judgment: Judgment normal.       Labs: Pending        2/29/2024   Mini Cog   Clock Draw Score 2 Normal   3 Item Recall 3 objects recalled   Mini Cog Total Score 5            Signed Electronically by: Andrew Mccollum MD

## 2024-03-01 NOTE — RESULT ENCOUNTER NOTE
Seth Rebolledo,    If you have not viewed these results on [facility] within 3 days, we will use an alternative method to contact you. We will contact you via the following protocol:    - Via letter if your results are normal.  - Via phone ([phone]) if your results are abnormal.     Here are my comments about your recent results:    Your liver tests were normal.    Your urine protein lab to monitor for kidney disease was normal.    Please call the clinic ([phone]), or message us on [employer] with any questions you may have.     Have a great day,    Dr. Jones

## 2024-05-08 ENCOUNTER — ANCILLARY PROCEDURE (OUTPATIENT)
Dept: MAMMOGRAPHY | Facility: CLINIC | Age: 74
End: 2024-05-08
Attending: FAMILY MEDICINE
Payer: MEDICARE

## 2024-05-08 DIAGNOSIS — Z12.31 ENCOUNTER FOR SCREENING MAMMOGRAM FOR MALIGNANT NEOPLASM OF BREAST: ICD-10-CM

## 2024-05-08 PROCEDURE — 77063 BREAST TOMOSYNTHESIS BI: CPT | Mod: GC | Performed by: STUDENT IN AN ORGANIZED HEALTH CARE EDUCATION/TRAINING PROGRAM

## 2024-05-08 PROCEDURE — 77067 SCR MAMMO BI INCL CAD: CPT | Mod: GC | Performed by: STUDENT IN AN ORGANIZED HEALTH CARE EDUCATION/TRAINING PROGRAM

## 2024-07-19 ENCOUNTER — OFFICE VISIT (OUTPATIENT)
Dept: FAMILY MEDICINE | Facility: CLINIC | Age: 74
End: 2024-07-19
Payer: MEDICARE

## 2024-07-19 VITALS
SYSTOLIC BLOOD PRESSURE: 130 MMHG | BODY MASS INDEX: 37.26 KG/M2 | HEART RATE: 82 BPM | HEIGHT: 62 IN | DIASTOLIC BLOOD PRESSURE: 68 MMHG | RESPIRATION RATE: 24 BRPM | TEMPERATURE: 96.8 F | OXYGEN SATURATION: 96 % | WEIGHT: 202.5 LBS

## 2024-07-19 DIAGNOSIS — I50.32 CHRONIC DIASTOLIC CONGESTIVE HEART FAILURE (H): ICD-10-CM

## 2024-07-19 DIAGNOSIS — Z01.818 PREOP GENERAL PHYSICAL EXAM: ICD-10-CM

## 2024-07-19 DIAGNOSIS — Z79.01 ANTICOAGULATED: ICD-10-CM

## 2024-07-19 DIAGNOSIS — R73.03 PREDIABETES: ICD-10-CM

## 2024-07-19 DIAGNOSIS — E03.4 HYPOTHYROIDISM DUE TO ACQUIRED ATROPHY OF THYROID: ICD-10-CM

## 2024-07-19 DIAGNOSIS — I48.0 PAROXYSMAL ATRIAL FIBRILLATION (H): Primary | ICD-10-CM

## 2024-07-19 LAB
ANION GAP SERPL CALCULATED.3IONS-SCNC: 9 MMOL/L (ref 7–15)
BUN SERPL-MCNC: 15 MG/DL (ref 8–23)
CALCIUM SERPL-MCNC: 9.8 MG/DL (ref 8.8–10.4)
CHLORIDE SERPL-SCNC: 102 MMOL/L (ref 98–107)
CREAT SERPL-MCNC: 0.76 MG/DL (ref 0.51–0.95)
EGFRCR SERPLBLD CKD-EPI 2021: 83 ML/MIN/1.73M2
ERYTHROCYTE [DISTWIDTH] IN BLOOD BY AUTOMATED COUNT: 13.2 % (ref 10–15)
GLUCOSE SERPL-MCNC: 88 MG/DL (ref 70–99)
HBA1C MFR BLD: 5.5 % (ref 0–5.6)
HCO3 SERPL-SCNC: 29 MMOL/L (ref 22–29)
HCT VFR BLD AUTO: 44.1 % (ref 35–47)
HGB BLD-MCNC: 15 G/DL (ref 11.7–15.7)
MCH RBC QN AUTO: 29.5 PG (ref 26.5–33)
MCHC RBC AUTO-ENTMCNC: 34 G/DL (ref 31.5–36.5)
MCV RBC AUTO: 87 FL (ref 78–100)
PLATELET # BLD AUTO: 256 10E3/UL (ref 150–450)
POTASSIUM SERPL-SCNC: 4.4 MMOL/L (ref 3.4–5.3)
RBC # BLD AUTO: 5.08 10E6/UL (ref 3.8–5.2)
SODIUM SERPL-SCNC: 140 MMOL/L (ref 135–145)
TSH SERPL DL<=0.005 MIU/L-ACNC: 2.51 UIU/ML (ref 0.3–4.2)
WBC # BLD AUTO: 5.4 10E3/UL (ref 4–11)

## 2024-07-19 PROCEDURE — G2211 COMPLEX E/M VISIT ADD ON: HCPCS

## 2024-07-19 PROCEDURE — 99214 OFFICE O/P EST MOD 30 MIN: CPT

## 2024-07-19 PROCEDURE — 93000 ELECTROCARDIOGRAM COMPLETE: CPT

## 2024-07-19 PROCEDURE — 80048 BASIC METABOLIC PNL TOTAL CA: CPT

## 2024-07-19 PROCEDURE — 83036 HEMOGLOBIN GLYCOSYLATED A1C: CPT

## 2024-07-19 PROCEDURE — 84443 ASSAY THYROID STIM HORMONE: CPT

## 2024-07-19 PROCEDURE — 36415 COLL VENOUS BLD VENIPUNCTURE: CPT

## 2024-07-19 PROCEDURE — 85027 COMPLETE CBC AUTOMATED: CPT

## 2024-07-19 RX ORDER — AMOXICILLIN 500 MG/1
500 TABLET, FILM COATED ORAL ONCE
COMMUNITY
Start: 2024-04-03

## 2024-07-19 RX ORDER — METOPROLOL SUCCINATE 100 MG/1
125 TABLET, EXTENDED RELEASE ORAL DAILY
COMMUNITY
Start: 2023-06-15

## 2024-07-19 ASSESSMENT — PAIN SCALES - GENERAL: PAINLEVEL: NO PAIN (0)

## 2024-07-19 NOTE — PROGRESS NOTES
Preoperative Evaluation  [facility] [place]  [address]  [place] [address]  Phone: [phone]  Fax: [phone]  Primary Provider: Andrew Mccollum MD  Pre-op Performing Provider: RAFAEL Carter CNP  Jul 19, 2024 7/19/2024   Surgical Information   What procedure is being done? cardiac ablation   Facility or Hospital where procedure/surgery will be performed: [facility]   Who is doing the procedure / surgery? cruz newby   Date of surgery / procedure: tues 6 23   Time of surgery / procedure: 6:15am   Where do you plan to recover after surgery? at home with family      Fax number for surgical facility:    Phone #: [phone]   [facility]    Surgeon: Cruz Newby    Assessment & Plan     The proposed surgical procedure is considered INTERMEDIATE risk.    1. Preop general physical exam  Patient is a 73 year-old female with CHF, hypothyroidism, prediabetes, and paroxysmal atrial fibrillation  presenting for preoperative physical exam. Meets 4 METS. Preoperative labs pending. EKG completed due to history of paroxsymal atrial fibrillation. EKG NSR without ectopy or ST/T-wave abnormality. Discussed medication that require holding prior to procedure. Patient is otherwise medically optimized for the above procedure pending labs.     2. Paroxysmal atrial fibrillation (H)  Following with cardiology at [facility]. Remains paroxsymal and symptomatic despite beta-blocker therapy. Plan for above procedure. Preoperative labs pending.   - EKG 12-lead complete w/read - Clinics  - Basic metabolic panel  (Ca, Cl, CO2, Creat, Gluc, K, Na, BUN)  - CBC with platelets    3. Chronic diastolic congestive heart failure (H)  No clinical sign of heart failure today or at her recent cardiology appointment.     4. Prediabetes  Managed with diet and exercise. Plan to check hemoglobin A1c today, results pending.  - Hemoglobin A1c    5.  Hypothyroidism due to acquired atrophy of thyroid  Stable. Prescribed levothyroxine. Plan to check TSH today as they were on the low end of normal on 2/24.   - TSH with free T4 reflex    6. Anticoagulated  She was instructed by her cardiology team to hold her apixaban for 2 doses prior to procedure.   - CBC with platelets     Implanted Device   - Type of device: ICD Patient advised to bring device information on day of surgery.    Risks and Recommendations  The patient has the following additional risks and recommendations for perioperative complications:  Obstructive Sleep Apnea:   Advised to bring CPAP day of procedure if needed.     Preoperative Medication Instructions  Antiplatelet or Anticoagulation Medication Instructions   - apixaban (Eliquis), edoxaban (Savaysa), rivaroxaban (Xarelto): Neuraxial or regional block anticipated AND CrCL (>=) 50mL/min. HOLD 3 days before surgery.   She was instructed by her cardiology team to hold her apixaban for 2 doses prior to procedure.      Additional Medication Instructions  Patient is to take all scheduled medications on the day of surgery EXCEPT for modifications listed below:   - Beta Blockers: Continue taking on the day of surgery.   - Statins: Continue taking on the day of surgery.    - anticholinergics: Continue without modification.    - Herbal medications and vitamins: HOLD 14 days prior to surgery.    Recommendation  Approval given to proceed with proposed procedure pending review of diagnostic evaluation.    Daphney Rebolledo is a 73 year old, presenting for the following:  Pre-Op Exam        7/19/2024    10:22 AM   Additional Questions   Roomed by AG   Accompanied by self     HPI related to upcoming procedure: paroxsymal atrial fibrillation        7/19/2024   Pre-Op Questionnaire   Have you ever had a heart attack or stroke? No   Have you ever had surgery on your heart or blood vessels, such as a stent placement, a coronary artery bypass, or surgery on an  artery in your head, neck, heart, or legs? (!) YES- pace maker   Do you have chest pain with activity? No   Do you have a history of heart failure? (!) YES- following with cardiology. Stable, no clinical appearance of CHF.    Do you currently have a cold, bronchitis or symptoms of other infection? No   Do you have a cough, shortness of breath, or wheezing? No   Do you or anyone in your family have previous history of blood clots? No   Do you or does anyone in your family have a serious bleeding problem such as prolonged bleeding following surgeries or cuts? No   Have you ever had problems with anemia or been told to take iron pills? No   Have you had any abnormal blood loss such as black, tarry or bloody stools, or abnormal vaginal bleeding? No   Have you ever had a blood transfusion? (!) YES   Have you ever had a transfusion reaction? No   Are you willing to have a blood transfusion if it is medically needed before, during, or after your surgery? Yes   Have you or any of your relatives ever had problems with anesthesia? No   Do you have sleep apnea, excessive snoring or daytime drowsiness? (!) YES   Do you have a CPAP machine? Yes   Do you have any artifical heart valves or other implanted medical devices like a pacemaker, defibrillator, or continuous glucose monitor? (!) YES   What type of device do you have? pacemaker   Name of the clinic that manages your device [facility]   Do you have artificial joints? (!) YES- left hip replacement with [facility]. Approval to proceed with planned procedure given by [facility] provider.    Are you allergic to latex? No      Health Care Directive  Patient does not have a Health Care Directive or Living Will: Patient states has Advance Directive and will bring in a copy to clinic.    Preoperative Review of    reviewed - controlled substances reflected in medication list.      Status of Chronic Conditions:  A-FIB - Patient has a longstanding history of chronic A-fib currently on  rate and rhythm control. Current treatment regimen includes Apixaban for stroke prevention and denies significant symptoms of lightheadedness, palpitations or dyspnea.     CHF - Patient has a longstanding history of moderate-severe CHF. Exacerbating conditions include atrial fibrilation. Currently the patient's condition is improving. Current treatment regimen includes beta blocker. The patient denies chest pain, edema, orthopnea, SOB or recent weight gain. Last Echocardiogram 6/28/23 with [facility], EKG completed today.   Interpretation Summary     * The left ventricular systolic function is normal, quantified ejection   fraction is 59%.     * Abnormal septal motion secondary to pacemaker.     * The proximal ascending aorta is mildly dilated measuring 3.8 cm.     * Compared to prior study dated 12-, ascending aorta has minimal   interval change from 3.7 to 3.8 cm. Arch is not well visualized in the current   study.     DIABETES - Patient has a longstanding history of DiabetesType prediabetes . Patient is being treated with diet and exercise and denies significant side effects. Control has been good.     HYPERLIPIDEMIA - Patient has a long history of significant Hyperlipidemia requiring medication for treatment with recent good control. Patient reports no problems or side effects with the medication.     HYPOTHYROIDISM- patient has a long history of hypothyroidism. No complications or concerns.     Patient Active Problem List    Diagnosis Date Noted    S/P total left hip arthroplasty 02/19/2024     Priority: Medium    Primary osteoarthritis of left hip 11/20/2023     Priority: Medium    Chronic diastolic congestive heart failure (H) 01/06/2022     Priority: Medium    Morbid obesity (H) 01/06/2022     Priority: Medium    Prediabetes 01/06/2022     Priority: Medium    Paroxysmal atrial fibrillation (H) 12/03/2020     Priority: Medium    Mild concentric left ventricular hypertrophy (LVH) 01/24/2018      Priority: Medium     Noted on Echo on 1/24/2018      Hypothyroidism due to acquired atrophy of thyroid 01/23/2018     Priority: Medium    Urinary urgency 09/13/2012     Priority: Medium    Atrophic vaginitis 09/13/2012     Priority: Medium    Urinary frequency 09/13/2012     Priority: Medium    Urge incontinence 09/13/2012     Priority: Medium    Postmenopausal- LMP 2001 06/23/2011     Priority: Medium    Fibroid, uterine      Priority: Medium     not symptomatic      Ganglion cyst      Priority: Medium     left palm        Past Medical History:   Diagnosis Date    Atrial fibrillation (H)     Chronic diastolic congestive heart failure (H)     Fibroid, uterine early 2000s    not symptomatic    Ganglion cyst     left palm    History of blood transfusion 6/4/85    Excessive bleeding during childbirth    Hypothyroidism due to acquired atrophy of thyroid 1/23/2018    Mild concentric left ventricular hypertrophy (LVH) 1/24/2018    Postmenopausal 6/23/2011    Primary osteoarthritis of left hip 11/20/2023     Past Surgical History:   Procedure Laterality Date    CARDIAC SURGERY      COLONOSCOPY  03/11/2010    moderate internal hemorrhoids- 10 yr f/u    COLONOSCOPY N/A 07/16/2021    Procedure: Colonoscopy, With Polypectomy And Biopsy;  Surgeon: Kirsten Molina MD;  Location: [facility] OR    COLONOSCOPY WITH CO2 INSUFFLATION N/A 07/16/2021    Procedure: COLONOSCOPY, WITH CO2 INSUFFLATION;  Surgeon: Kirsten Molina MD;  Location: [facility] OR    EYE SURGERY  10/17/2017    Cateract removal    HC CORRECT BUNION,DOUBLE OSTEOTOMY  12/08/1998    Left foot bunion removal    HC HYSTEROSCOPY W ENDOMETRIAL BX/POLYPECTOMY W/WO D&C  12/21/2004     LAPAROSCOPY, SURGICAL; CHOLECYSTECTOMY  02/14/2005    ZZC REPAIR CRUCIATE LIGAMENT,KNEE  1996    Left knee     Current Outpatient Medications   Medication Sig Dispense Refill    apixaban ANTICOAGULANT (ELIQUIS ANTICOAGULANT) 5 MG tablet Take 1 tablet (5 mg) by mouth 2 times daily 180 tablet 3     "atorvastatin (LIPITOR) 10 MG tablet Take 1 tablet (10 mg) by mouth daily 90 tablet 3    levothyroxine (SYNTHROID/LEVOTHROID) 75 MCG tablet TAKE ONE TABLET BY MOUTH ONCE DAILY 90 tablet 3    metoprolol succinate ER (TOPROL XL) 100 MG 24 hr tablet Take 1 tablet (100 mg) by mouth 2 times daily 180 tablet 2    metoprolol succinate ER (TOPROL XL) 25 MG 24 hr tablet Take 1 tablet by mouth 2 times daily      Omega-3 Fatty Acids (FISH OIL PO) Take  by mouth.      oxyBUTYnin (DITROPAN) 5 MG tablet Take 1 tablet (5 mg) by mouth daily 90 tablet 3    oxyCODONE (ROXICODONE) 5 MG tablet TAKE ONE TABLET BY MOUTH EVERY 4 HOURS AS NEEDED FOR PAIN *CAUTION: OPIOID. RISK OF OVERDOSE AND ADDICTION. (Patient not taking: Reported on 7/19/2024)      sotalol (BETAPACE) 160 MG tablet Take 1 tablet by mouth 2 times daily (Patient not taking: Reported on 7/19/2024)       Allergies   Allergen Reactions    No Known Drug Allergy       Social History     Tobacco Use    Smoking status: Never     Passive exposure: Never    Smokeless tobacco: Never   Substance Use Topics    Alcohol use: Yes     Comment: 3.5% beer     History   Drug Use No         Review of Systems  Constitutional, neuro, ENT, endocrine, pulmonary, cardiac, gastrointestinal, genitourinary, musculoskeletal, integument and psychiatric systems are negative, except as otherwise noted.    Objective    /68   Pulse 82   Temp (!) 95.2  F (35.1  C)   Resp 24   Ht 1.58 m (5' 2.21\")   Wt 91.9 kg (202 lb 8 oz)   LMP 10/31/2004 (Exact Date)   SpO2 96%   BMI 36.79 kg/m     Estimated body mass index is 36.79 kg/m  as calculated from the following:    Height as of this encounter: 1.58 m (5' 2.21\").    Weight as of this encounter: 91.9 kg (202 lb 8 oz).  Physical Exam  GENERAL: alert and no distress  EYES: Eyes grossly normal to inspection, PERRL and conjunctivae and sclerae normal  HENT: ear canals and TM's normal, nose and mouth without ulcers or lesions  NECK: no adenopathy, no " asymmetry, masses, or scars  RESP: lungs clear to auscultation - no rales, rhonchi or wheezes  CV: regular rate and rhythm, normal S1 S2, no S3 or S4, no murmur, click or rub, no peripheral edema  ABDOMEN: soft, nontender, no hepatosplenomegaly, no masses and bowel sounds normal  MS: no gross musculoskeletal defects noted, no edema  SKIN: no suspicious lesions or rashes  NEURO: Normal strength and tone, mentation intact and speech normal  PSYCH: mentation appears normal, affect normal/bright  LYMPH: no cervical or supraclavicular adenopathy    Recent Labs   Lab Test 02/29/24  1045 02/14/24  1604 11/08/23  1032   HGB  --  14.8  --    PLT  --  257  --    NA  --  140 139   POTASSIUM  --  4.4 4.6   CR  --  0.77 0.68   A1C 5.6  --   --       Diagnostics  Labs pending at this time.  Results will be reviewed when available.   EKG required for significant arrhythmia and not completed in the last 90 days.   EKG: appears normal, NSR, normal axis, normal intervals, no acute ST/T changes c/w ischemia, no LVH by voltage criteria, unchanged from previous tracings    Revised Cardiac Risk Index (RCRI)  The patient has the following serious cardiovascular risks for perioperative complications:   - Congestive Heart Failure (pulmonary edema, PND, s3 nic, CXR with pulmonary congestion, basilar rales) = 1 point     RCRI Interpretation: 1 point: Class II (low risk - 0.9% complication rate)     Signed Electronically by: RAFAEL Carter CNP  Copy of this evaluation report is provided to requesting physician.

## 2024-07-19 NOTE — PATIENT INSTRUCTIONS
How to Take Your Medication Before Surgery  Preoperative Medication Instructions   Antiplatelet or Anticoagulation Medication Instructions   - apixaban (Eliquis), edoxaban (Savaysa), rivaroxaban (Xarelto): Neuraxial or regional block anticipated AND CrCL (>=) 50mL/min. HOLD 3 days before surgery.   She was instructed by her cardiology team to hold her apixaban for 2 doses prior to procedure.      Additional Medication Instructions  Patient is to take all scheduled medications on the day of surgery EXCEPT for modifications listed below:   - Beta Blockers: Continue taking on the day of surgery.   - Statins: Continue taking on the day of surgery.    - anticholinergics: Continue without modification.    - Herbal medications and vitamins: HOLD 14 days prior to surgery.       Patient Education   Preparing for Your Surgery  Getting started  A nurse will call you to review your health history and instructions. They will give you an arrival time based on your scheduled surgery time. Please be ready to share:  Your doctor's clinic name and phone number  Your medical, surgical, and anesthesia history  A list of allergies and sensitivities  A list of medicines, including herbal treatments and over-the-counter drugs  Whether the patient has a legal guardian (ask how to send us the papers in advance)  Please tell us if you're pregnant--or if there's any chance you might be pregnant. Some surgeries may injure a fetus (unborn baby), so they require a pregnancy test. Surgeries that are safe for a fetus don't always need a test, and you can choose whether to have one.   If you have a child who's having surgery, please ask for a copy of Preparing for Your Child's Surgery.    Preparing for surgery  Within 10 to 30 days of surgery: Have a pre-op exam (sometimes called an H&P, or History and Physical). This can be done at a clinic or pre-operative center.  If you're having a , you may not need this exam. Talk to your care  team.  At your pre-op exam, talk to your care team about all medicines you take. If you need to stop any medicines before surgery, ask when to start taking them again.  We do this for your safety. Many medicines can make you bleed too much during surgery. Some change how well surgery (anesthesia) drugs work.  Call your insurance company to let them know you're having surgery. (If you don't have insurance, call [phone].)  Call your clinic if there's any change in your health. This includes signs of a cold or flu (sore throat, runny nose, cough, rash, fever). It also includes a scrape or scratch near the surgery site.  If you have questions on the day of surgery, call your hospital or surgery center.  Eating and drinking guidelines  For your safety: Unless your surgeon tells you otherwise, follow the guidelines below.  Eat and drink as usual until 8 hours before you arrive for surgery. After that, no food or milk.  Drink clear liquids until 2 hours before you arrive. These are liquids you can see through, like water, Gatorade, and Propel Water. They also include plain black coffee and tea (no cream or milk), candy, and breath mints. You can spit out gum when you arrive.  If you drink alcohol: Stop drinking it the night before surgery.  If your care team tells you to take medicine on the morning of surgery, it's okay to take it with a sip of water.  Preventing infection  Shower or bathe the night before and morning of your surgery. Follow the instructions your clinic gave you. (If no instructions, use regular soap.)  Don't shave or clip hair near your surgery site. We'll remove the hair if needed.  Don't smoke or vape the morning of surgery. You may chew nicotine gum up to 2 hours before surgery. A nicotine patch is okay.  Note: Some surgeries require you to completely quit smoking and nicotine. Check with your surgeon.  Your care team will make every effort to keep you safe from infection. We will:  Clean our  hands often with soap and water (or an alcohol-based hand rub).  Clean the skin at your surgery site with a special soap that kills germs.  Give you a special gown to keep you warm. (Cold raises the risk of infection.)  Wear special hair covers, masks, gowns and gloves during surgery.  Give antibiotic medicine, if prescribed. Not all surgeries need antibiotics.  What to bring on the day of surgery  Photo ID and insurance card  Copy of your health care directive, if you have one  Glasses and hearing aids (bring cases)  You can't wear contacts during surgery  Inhaler and eye drops, if you use them (tell us about these when you arrive)  CPAP machine or breathing device, if you use them  A few personal items, if spending the night  If you have . . .  A pacemaker, ICD (cardiac defibrillator) or other implant: Bring the ID card.  An implanted stimulator: Bring the remote control.  A legal guardian: Bring a copy of the certified (court-stamped) guardianship papers.  Please remove any jewelry, including body piercings. Leave jewelry and other valuables at home.  If you're going home the day of surgery  You must have a responsible adult drive you home. They should stay with you overnight as well.  If you don't have someone to stay with you, and you aren't safe to go home alone, we may keep you overnight. Insurance often won't pay for this.  After surgery  If it's hard to control your pain or you need more pain medicine, please call your surgeon's office.  Questions?   If you have any questions for your care team, list them here: _________________________________________________________________________________________________________________________________________________________________________ ____________________________________ ____________________________________ ____________________________________  For informational purposes only. Not to replace the advice of your health care provider. Copyright   2003, 2019 [facility]. All rights reserved. Clinically reviewed by Aurora Henley MD. [employer] [id number] - REV 12/22.

## 2024-10-29 ENCOUNTER — OFFICE VISIT (OUTPATIENT)
Dept: FAMILY MEDICINE | Facility: OTHER | Age: 74
End: 2024-10-29
Payer: MEDICARE

## 2024-10-29 VITALS
SYSTOLIC BLOOD PRESSURE: 118 MMHG | HEIGHT: 62 IN | BODY MASS INDEX: 37.54 KG/M2 | WEIGHT: 204 LBS | OXYGEN SATURATION: 95 % | TEMPERATURE: 97.9 F | DIASTOLIC BLOOD PRESSURE: 72 MMHG | HEART RATE: 94 BPM | RESPIRATION RATE: 18 BRPM

## 2024-10-29 DIAGNOSIS — L82.0 INFLAMED SEBORRHEIC KERATOSIS: Primary | ICD-10-CM

## 2024-10-29 PROCEDURE — 17110 DESTRUCTION B9 LES UP TO 14: CPT

## 2024-10-29 ASSESSMENT — PAIN SCALES - GENERAL: PAINLEVEL_OUTOF10: NO PAIN (0)

## 2024-10-29 NOTE — PROGRESS NOTES
"  Assessment & Plan     Inflamed seborrheic keratosis  Exam is consistent with benign seborrheic keratosis. Cryotherapy was done, see procedure details below.  - DESTRUCT BENIGN LESION, UP TO 14    Patient informed of risks (permanent scarring, infection, light or dark discoloration, bleeding, infection, weakness, numbness and recurrence of the lesion) and benefits of the procedure and verbal informed consent obtained.    The areas are treated with liquid nitrogen therapy, frozen until ice ball extended 1 mm beyond lesion, allowed to thaw, and treated again. The patient tolerated procedure well. Warned that there may be blister formation, redness and pain. Recommend OTC analgesia as needed for pain.    1. Instructed to keep the area dry and covered for 24-48h and clean thereafter.  2. Warning signs of infection were reviewed.              BMI  Estimated body mass index is 37.07 kg/m  as calculated from the following:    Height as of this encounter: 1.58 m (5' 2.21\").    Weight as of this encounter: 92.5 kg (204 lb).     Daphney Rebolledo is a 74 year old, presenting for the following health issues:  Mole      10/29/2024     3:21 PM   Additional Questions   Roomed by Wendy ALMAGUER     History of Present Illness       Reason for visit:  Suspicious mole on my left shoulder blade.  Symptom onset:  3-4 weeks ago  Symptom intensity:  Moderate  Symptom progression:  Worsening  Had these symptoms before:  No   She is taking medications regularly.         Skin Lesion  Onset/Duration: years  Description  Location: upper left back  Color: multicolored  Border description: irregular border, irregularly pigmented, raised, red, scaly, inflamed  Character: blotchy, raised, flakey  Itching: moderate  Bleeding:  YES- had bled once   Intensity:  moderate  Progression of Symptoms:  same  Accompanying signs and symptoms:   Bleeding: YES- only once but thinks it could be from scratching it  Scaling: YES  Excessive sun exposure/tanning: " "No  Sunscreen used: YES  History:           Any previous history of skin cancer: No  Any family history of melanoma: No  Previous episodes of similar lesion: No  Precipitating or alleviating factors: more noticeable if pressure is applied for long period of time  Therapies tried and outcome: hydrocortisone cream -  temporary relief of itching        Objective    /72 (Cuff Size: Adult Regular)   Pulse 94   Temp 97.9  F (36.6  C)   Resp 18   Ht 1.58 m (5' 2.21\")   Wt 92.5 kg (204 lb)   LMP 10/31/2004 (Exact Date)   SpO2 95%   BMI 37.07 kg/m    Body mass index is 37.07 kg/m .  Physical Exam  Constitutional:       General: She is not in acute distress.     Appearance: Normal appearance.   Cardiovascular:      Rate and Rhythm: Normal rate and regular rhythm.   Pulmonary:      Effort: Pulmonary effort is normal. No respiratory distress.      Breath sounds: Normal breath sounds.   Skin:     Comments: 2 cm raised, stuck on lesion, mildly erythematous over the left back, under the bra line, consistent with seborrheic keratosis    Neurological:      Mental Status: She is alert.       Signed Electronically by: Kenneth Logan DO    "

## 2024-11-19 DIAGNOSIS — I48.0 PAROXYSMAL ATRIAL FIBRILLATION (H): ICD-10-CM

## 2024-11-20 RX ORDER — METOPROLOL SUCCINATE 100 MG/1
TABLET, EXTENDED RELEASE ORAL
Qty: 180 TABLET | Refills: 2 | Status: SHIPPED | OUTPATIENT
Start: 2024-11-20

## 2025-01-14 DIAGNOSIS — E03.4 HYPOTHYROIDISM DUE TO ACQUIRED ATROPHY OF THYROID: ICD-10-CM

## 2025-01-15 RX ORDER — LEVOTHYROXINE SODIUM 75 UG/1
75 TABLET ORAL DAILY
Qty: 90 TABLET | Refills: 2 | Status: SHIPPED | OUTPATIENT
Start: 2025-01-15

## 2025-02-04 DIAGNOSIS — N39.41 URGE INCONTINENCE: ICD-10-CM

## 2025-02-04 RX ORDER — OXYBUTYNIN CHLORIDE 5 MG/1
5 TABLET ORAL DAILY
Qty: 90 TABLET | Refills: 1 | Status: SHIPPED | OUTPATIENT
Start: 2025-02-04

## 2025-03-04 SDOH — HEALTH STABILITY: PHYSICAL HEALTH: ON AVERAGE, HOW MANY MINUTES DO YOU ENGAGE IN EXERCISE AT THIS LEVEL?: 50 MIN

## 2025-03-04 SDOH — HEALTH STABILITY: PHYSICAL HEALTH: ON AVERAGE, HOW MANY DAYS PER WEEK DO YOU ENGAGE IN MODERATE TO STRENUOUS EXERCISE (LIKE A BRISK WALK)?: 3 DAYS

## 2025-03-04 ASSESSMENT — SOCIAL DETERMINANTS OF HEALTH (SDOH): HOW OFTEN DO YOU GET TOGETHER WITH FRIENDS OR RELATIVES?: ONCE A WEEK

## 2025-03-05 ENCOUNTER — OFFICE VISIT (OUTPATIENT)
Dept: FAMILY MEDICINE | Facility: CLINIC | Age: 75
End: 2025-03-05
Attending: FAMILY MEDICINE
Payer: MEDICARE

## 2025-03-05 VITALS
HEIGHT: 63 IN | WEIGHT: 208.5 LBS | RESPIRATION RATE: 21 BRPM | TEMPERATURE: 97.9 F | HEART RATE: 81 BPM | OXYGEN SATURATION: 97 % | DIASTOLIC BLOOD PRESSURE: 78 MMHG | BODY MASS INDEX: 36.94 KG/M2 | SYSTOLIC BLOOD PRESSURE: 116 MMHG

## 2025-03-05 DIAGNOSIS — R73.03 PREDIABETES: ICD-10-CM

## 2025-03-05 DIAGNOSIS — Z78.9 ALCOHOL USE: ICD-10-CM

## 2025-03-05 DIAGNOSIS — E66.812 CLASS 2 SEVERE OBESITY DUE TO EXCESS CALORIES WITH SERIOUS COMORBIDITY AND BODY MASS INDEX (BMI) OF 37.0 TO 37.9 IN ADULT (H): ICD-10-CM

## 2025-03-05 DIAGNOSIS — Z00.00 ENCOUNTER FOR MEDICARE ANNUAL WELLNESS EXAM: Primary | ICD-10-CM

## 2025-03-05 DIAGNOSIS — I48.0 PAROXYSMAL ATRIAL FIBRILLATION (H): ICD-10-CM

## 2025-03-05 DIAGNOSIS — Z71.89 ACP (ADVANCE CARE PLANNING): ICD-10-CM

## 2025-03-05 DIAGNOSIS — Z12.31 ENCOUNTER FOR SCREENING MAMMOGRAM FOR MALIGNANT NEOPLASM OF BREAST: ICD-10-CM

## 2025-03-05 DIAGNOSIS — I50.32 CHRONIC DIASTOLIC CONGESTIVE HEART FAILURE (H): ICD-10-CM

## 2025-03-05 DIAGNOSIS — E78.5 HYPERLIPIDEMIA LDL GOAL <100: ICD-10-CM

## 2025-03-05 DIAGNOSIS — E66.01 CLASS 2 SEVERE OBESITY DUE TO EXCESS CALORIES WITH SERIOUS COMORBIDITY AND BODY MASS INDEX (BMI) OF 37.0 TO 37.9 IN ADULT (H): ICD-10-CM

## 2025-03-05 LAB
ALBUMIN SERPL BCG-MCNC: 4.2 G/DL (ref 3.5–5.2)
ALP SERPL-CCNC: 58 U/L (ref 40–150)
ALT SERPL W P-5'-P-CCNC: 22 U/L (ref 0–50)
ANION GAP SERPL CALCULATED.3IONS-SCNC: 10 MMOL/L (ref 7–15)
AST SERPL W P-5'-P-CCNC: 22 U/L (ref 0–45)
BASOPHILS # BLD AUTO: 0 10E3/UL (ref 0–0.2)
BASOPHILS NFR BLD AUTO: 1 %
BILIRUB SERPL-MCNC: 0.5 MG/DL
BUN SERPL-MCNC: 9.8 MG/DL (ref 8–23)
CALCIUM SERPL-MCNC: 9.7 MG/DL (ref 8.8–10.4)
CHLORIDE SERPL-SCNC: 106 MMOL/L (ref 98–107)
CHOLEST SERPL-MCNC: 147 MG/DL
CREAT SERPL-MCNC: 0.67 MG/DL (ref 0.51–0.95)
CREAT UR-MCNC: 77.7 MG/DL
EGFRCR SERPLBLD CKD-EPI 2021: >90 ML/MIN/1.73M2
EOSINOPHIL # BLD AUTO: 0.1 10E3/UL (ref 0–0.7)
EOSINOPHIL NFR BLD AUTO: 3 %
ERYTHROCYTE [DISTWIDTH] IN BLOOD BY AUTOMATED COUNT: 12.7 % (ref 10–15)
EST. AVERAGE GLUCOSE BLD GHB EST-MCNC: 105 MG/DL
FASTING STATUS PATIENT QL REPORTED: YES
FASTING STATUS PATIENT QL REPORTED: YES
GLUCOSE SERPL-MCNC: 113 MG/DL (ref 70–99)
HBA1C MFR BLD: 5.3 % (ref 0–5.6)
HCO3 SERPL-SCNC: 27 MMOL/L (ref 22–29)
HCT VFR BLD AUTO: 41.2 % (ref 35–47)
HDLC SERPL-MCNC: 62 MG/DL
HGB BLD-MCNC: 14 G/DL (ref 11.7–15.7)
IMM GRANULOCYTES # BLD: 0 10E3/UL
IMM GRANULOCYTES NFR BLD: 0 %
LDLC SERPL CALC-MCNC: 66 MG/DL
LYMPHOCYTES # BLD AUTO: 1 10E3/UL (ref 0.8–5.3)
LYMPHOCYTES NFR BLD AUTO: 25 %
MCH RBC QN AUTO: 30.5 PG (ref 26.5–33)
MCHC RBC AUTO-ENTMCNC: 34 G/DL (ref 31.5–36.5)
MCV RBC AUTO: 90 FL (ref 78–100)
MICROALBUMIN UR-MCNC: <12 MG/L
MICROALBUMIN/CREAT UR: NORMAL MG/G{CREAT}
MONOCYTES # BLD AUTO: 0.4 10E3/UL (ref 0–1.3)
MONOCYTES NFR BLD AUTO: 11 %
NEUTROPHILS # BLD AUTO: 2.4 10E3/UL (ref 1.6–8.3)
NEUTROPHILS NFR BLD AUTO: 61 %
NONHDLC SERPL-MCNC: 85 MG/DL
PLATELET # BLD AUTO: 213 10E3/UL (ref 150–450)
POTASSIUM SERPL-SCNC: 4.5 MMOL/L (ref 3.4–5.3)
PROT SERPL-MCNC: 6.8 G/DL (ref 6.4–8.3)
RBC # BLD AUTO: 4.59 10E6/UL (ref 3.8–5.2)
SODIUM SERPL-SCNC: 143 MMOL/L (ref 135–145)
TRIGL SERPL-MCNC: 93 MG/DL
TSH SERPL DL<=0.005 MIU/L-ACNC: 1.1 UIU/ML (ref 0.3–4.2)
WBC # BLD AUTO: 4 10E3/UL (ref 4–11)

## 2025-03-05 RX ORDER — METOPROLOL SUCCINATE 100 MG/1
100 TABLET, EXTENDED RELEASE ORAL DAILY
Qty: 90 TABLET | Refills: 3 | Status: SHIPPED | OUTPATIENT
Start: 2025-03-05

## 2025-03-05 RX ORDER — ATORVASTATIN CALCIUM 10 MG/1
10 TABLET, FILM COATED ORAL DAILY
Qty: 90 TABLET | Refills: 3 | Status: SHIPPED | OUTPATIENT
Start: 2025-03-05

## 2025-03-05 ASSESSMENT — PAIN SCALES - GENERAL: PAINLEVEL_OUTOF10: NO PAIN (0)

## 2025-03-05 NOTE — PATIENT INSTRUCTIONS
Patient Education     Call your insurance about where to get the RSV vaccine if interested.   Preventive Care Advice   This is general advice given by our system to help you stay healthy. However, your care team may have specific advice just for you. Please talk to your care team about your preventive care needs.  Nutrition  Eat 5 or more servings of fruits and vegetables each day.  Try wheat bread, brown rice and whole grain pasta (instead of white bread, rice, and pasta).  Get enough calcium and vitamin D. Check the label on foods and aim for 100% of the RDA (recommended daily allowance).  Lifestyle  Exercise at least 150 minutes each week  (30 minutes a day, 5 days a week).  Do muscle strengthening activities 2 days a week. These help control your weight and prevent disease.  No smoking.  Wear sunscreen to prevent skin cancer.  Have a dental exam and cleaning every 6 months.  Yearly exams  See your health care team every year to talk about:  Any changes in your health.  Any medicines your care team has prescribed.  Preventive care, family planning, and ways to prevent chronic diseases.  Shots (vaccines)   HPV shots (up to age 26), if you've never had them before.  Hepatitis B shots (up to age 59), if you've never had them before.  COVID-19 shot: Get this shot when it's due.  Flu shot: Get a flu shot every year.  Tetanus shot: Get a tetanus shot every 10 years.  Pneumococcal, hepatitis A, and RSV shots: Ask your care team if you need these based on your risk.  Shingles shot (for age 50 and up)  General health tests  Diabetes screening:  Starting at age 35, Get screened for diabetes at least every 3 years.  If you are younger than age 35, ask your care team if you should be screened for diabetes.  Cholesterol test: At age 39, start having a cholesterol test every 5 years, or more often if advised.  Bone density scan (DEXA): At age 50, ask your care team if you should have this scan for osteoporosis (brittle  bones).  Hepatitis C: Get tested at least once in your life.  STIs (sexually transmitted infections)  Before age 24: Ask your care team if you should be screened for STIs.  After age 24: Get screened for STIs if you're at risk. You are at risk for STIs (including HIV) if:  You are sexually active with more than one person.  You don't use condoms every time.  You or a partner was diagnosed with a sexually transmitted infection.  If you are at risk for HIV, ask about PrEP medicine to prevent HIV.  Get tested for HIV at least once in your life, whether you are at risk for HIV or not.  Cancer screening tests  Cervical cancer screening: If you have a cervix, begin getting regular cervical cancer screening tests starting at age 21.  Breast cancer scan (mammogram): If you've ever had breasts, begin having regular mammograms starting at age 40. This is a scan to check for breast cancer.  Colon cancer screening: It is important to start screening for colon cancer at age 45.  Have a colonoscopy test every 10 years (or more often if you're at risk) Or, ask your provider about stool tests like a FIT test every year or Cologuard test every 3 years.  To learn more about your testing options, visit:   .  For help making a decision, visit:   https://bit.ly/nw94070.  Prostate cancer screening test: If you have a prostate, ask your care team if a prostate cancer screening test (PSA) at age 55 is right for you.  Lung cancer screening: If you are a current or former smoker ages 50 to 80, ask your care team if ongoing lung cancer screenings are right for you.  For informational purposes only. Not to replace the advice of your health care provider. Copyright   2023 [facility]. All rights reserved. Clinically reviewed by the [facility] Transitions Program. [employer] 022056 - REV 01/24.  Learning About Activities of Daily Living  What are activities of daily living?     Activities of daily living (ADLs) are the basic  self-care tasks you do every day. These include eating, bathing, dressing, and moving around.  As you age, and if you have health problems, you may find that it's harder to do some of these tasks. If so, your doctor can suggest ideas that may help.  To measure what kind of help you may need, your doctor will ask how well you are able to do ADLs. Let your doctor know if there are any tasks that you are having trouble doing. This is an important first step to getting help. And when you have the help you need, you can stay as independent as possible.  How will a doctor assess your ADLs?  Asking about ADLs is part of a routine health checkup your doctor will likely do as you age. Your health check might be done in a doctor's office, in your home, or at a hospital. The goal is to find out if you are having any problems that could make it hard to care for yourself or that make it unsafe for you to be on your own.  To measure your ADLs, your doctor will ask how hard it is for you to do routine tasks. Your doctor may also want to know if you have changed the way you do a task because of a health problem. Your doctor may watch how you:  Walk back and forth.  Keep your balance while you stand or walk.  Move from sitting to standing or from a bed to a chair.  Button or unbutton a shirt or sweater.  Remove and put on your shoes.  It's common to feel a little worried or anxious if you find you can't do all the things you used to be able to do. Talking with your doctor about ADLs is a way to make sure you're as safe as possible and able to care for yourself as well as you can. You may want to bring a caregiver, friend, or family member to your checkup. They can help you talk to your doctor.  Follow-up care is a key part of your treatment and safety. Be sure to make and go to all appointments, and call your doctor if you are having problems. It's also a good idea to know your test results and keep a list of the medicines you  "take.  Current as of: October 24, 2023  Content Version: 14.3    2024 [employer].   Care instructions adapted under license by your healthcare professional. If you have questions about a medical condition or this instruction, always ask your healthcare professional. [employer] disclaims any warranty or liability for your use of this information.    9 Ways to Cut Back on Drinking  Maybe you've found yourself drinking more alcohol than you'd prefer. If you want to cut back, here are some ideas to try.    Think before you drink.  Do you really want a drink, or is it just a habit? If you're used to having a drink at a certain time, try doing something else then.     Look for substitutes.  Find some no-alcohol drinks that you enjoy, like flavored seltzer water, tea with honey, or tonic with a slice of lime. Or try alcohol-free beer or \"virgin\" cocktails (without the alcohol).     Drink more water.  Use water to quench your thirst. Drink a glass of water before you have any alcohol. Have another glass along with every drink or between drinks.     Shrink your drink.  For example, have a bottle of beer instead of a pint. Use a smaller glass for wine. Choose drinks with lower alcohol content (ABV%). Or use less liquor and more mixer in cocktails.     Slow down.  It's easy to drink quickly and without thinking about it. Pay attention, and make each drink last longer.     Do the math.  Total up how much you spend on alcohol each month. How much is that a year? If you cut back, what could you do with the money you save?     Take a break.  Choose a day or two each week when you won't drink at all. Notice how you feel on those days, physically and emotionally. How did you sleep? Do you feel better? Over time, add more break days.     Count calories.  Would you like to lose some weight? For some people that's a good motivator for cutting back. Figure out how many calories are in each drink. How many does " "that add up to in a day? In a week? In a month?     Practice saying no.  Be ready when someone offers you a drink. Try: \"Thanks, I've had enough.\" Or \"Thanks, but I'm cutting back.\" Or \"No, thanks. I feel better when I drink less.\"   Current as of: November 15, 2023  Content Version: 14.3    2024 [employer].   Care instructions adapted under license by your healthcare professional. If you have questions about a medical condition or this instruction, always ask your healthcare professional. [employer] disclaims any warranty or liability for your use of this information.  Substance Use Disorder: Care Instructions  Overview     You can improve your life and health by stopping your use of alcohol or drugs. When you don't drink or use drugs, you may feel and sleep better. You may get along better with your family, friends, and coworkers. There are medicines and programs that can help with substance use disorder.  How can you care for yourself at home?  Here are some ways to help you stay sober and prevent relapse.  If you have been given medicine to help keep you sober or reduce your cravings, be sure to take it exactly as prescribed.  Talk to your doctor about programs that can help you stop using drugs or drinking alcohol.  Do not keep alcohol or drugs in your home.  Plan ahead. Think about what you'll say if other people ask you to drink or use drugs. Try not to spend time with people who drink or use drugs.  Use the time and money spent on drinking or drugs to do something that's important to you.  Preventing a relapse  Have a plan to deal with relapse. Learn to recognize changes in your thinking that lead you to drink or use drugs. Get help before you start to drink or use drugs again.  Try to stay away from situations, friends, or places that may lead you to drink or use drugs.  If you feel the need to drink alcohol or use drugs again, seek help right away. Call a trusted friend or family " member. Some people get support from organizations such as Narcotics Anonymous or [employer] or from treatment facilities.  If you relapse, get help as soon as you can. Some people make a plan with another person that outlines what they want that person to do for them if they relapse. The plan usually includes how to handle the relapse and who to notify in case of relapse.  Don't give up. Remember that a relapse doesn't mean that you have failed. Use the experience to learn the triggers that lead you to drink or use drugs. Then quit again. Recovery is a lifelong process. Many people have several relapses before they are able to quit for good.  Follow-up care is a key part of your treatment and safety. Be sure to make and go to all appointments, and call your doctor if you are having problems. It's also a good idea to know your test results and keep a list of the medicines you take.  When should you call for help?   Call 911  anytime you think you may need emergency care. For example, call if you or someone else:    Has overdosed or has withdrawal signs. Be sure to tell the emergency workers that you are or someone else is using or trying to quit using drugs. Overdose or withdrawal signs may include:  Losing consciousness.  Seizure.  Seeing or hearing things that aren't there (hallucinations).     Is thinking or talking about suicide or harming others.   Where to get help 24 hours a day, 7 days a week   If you or someone you know talks about suicide, self-harm, a mental health crisis, a substance use crisis, or any other kind of emotional distress, get help right away. You can:    Call the Suicide and Crisis Lifeline at 470.     Call 7-644-795-TALK (1-742.905.4161).     Text HOME to 500472 to access the Crisis Text Line.   Consider saving these numbers in your phone.  Go to [email or web address] for more information or to chat online.  Call your doctor now or seek immediate medical care if:    You are having withdrawal  "symptoms. These may include nausea or vomiting, sweating, shakiness, and anxiety.   Watch closely for changes in your health, and be sure to contact your doctor if:    You have a relapse.     You need more help or support to stop.   Where can you learn more?  Go to https://www.[employer].net/patiented  Enter H573 in the search box to learn more about \"Substance Use Disorder: Care Instructions.\"  Current as of: November 15, 2023  Content Version: 14.3    2024 [employer].   Care instructions adapted under license by your healthcare professional. If you have questions about a medical condition or this instruction, always ask your healthcare professional. [employer] disclaims any warranty or liability for your use of this information.       "

## 2025-03-05 NOTE — PROGRESS NOTES
Preventive Care Visit  [facility] YONI Mccollum MD, Family Medicine  Mar 5, 2025    Assessment & Plan   1. Encounter for Medicare annual wellness exam (Primary)  Discussed personal health and safety. Routine screenings ordered as below. Appropriate anticipatory guidance, vaccinations, and health screening recommendations delivered according to the USPSTF and other appropriate society guidelines. Kesha reports understanding and in agreement with this mutually agreed upon plan.    Today's Orders:  - PRIMARY CARE FOLLOW-UP SCHEDULING  - Albumin Random Urine Quantitative with Creat Ratio; Future  - REVIEW OF HEALTH MAINTENANCE PROTOCOL ORDERS  - [place] ADVANCE CARE PLANNING FIRST 30 MINS  - Lipid panel reflex to direct LDL Non-fasting; Future  - Comprehensive metabolic panel; Future  - CBC with Platelets & Differential; Future  - Hemoglobin A1c; Future  - reason aspirin not prescribed, intentional,; Please choose reason not prescribed from choices below.  - TSH with free T4 reflex; Future  - MA Screen Bilateral w/Scott; Future    2. ACP (advance care planning)  Discussed importance of ACP as part of standard end of life care planning / Preventative health. Discussed it can improve his care and make decisions/grief easier for family should it ever need to be implemented. Discussion lasted < 30 min.  - [place] ADVANCE CARE PLANNING FIRST 30 MINS    3. Paroxysmal atrial fibrillation (H)  4. Chronic diastolic congestive heart failure (H)  Updated dose of metoprolol. Follow-up with cardiology.  - reason aspirin not prescribed, intentional,; Please choose reason not prescribed from choices below.  - metoprolol succinate ER (TOPROL XL) 100 MG 24 hr tablet; Take 1 tablet (100 mg) by mouth daily.  Dispense: 90 tablet; Refill: 3  - atorvastatin (LIPITOR) 10 MG tablet; Take 1 tablet (10 mg) by mouth daily.  Dispense: 90 tablet; Refill: 3  - reason aspirin not prescribed, intentional,; Please choose reason  not prescribed from choices below.  - metoprolol succinate ER (TOPROL XL) 100 MG 24 hr tablet; Take 1 tablet (100 mg) by mouth daily.  Dispense: 90 tablet; Refill: 3    5. Hyperlipidemia LDL goal <100  Check labs to monitor efficacy of interventions (medication, lifestyle, etc). No side effects reported. Continue on current therapy. See medication list for more details. Ok for refill of current lipid lowering medications for next 1 year. Will need follow-up visit with labs in 1 year.  - atorvastatin (LIPITOR) 10 MG tablet; Take 1 tablet (10 mg) by mouth daily.  Dispense: 90 tablet; Refill: 3  - Lipid panel reflex to direct LDL Non-fasting; Future    6. Prediabetes  Monitor yearly A1c. Encourage weight loss and healthy diet.  - Albumin Random Urine Quantitative with Creat Ratio; Future  - Hemoglobin A1c; Future    7. Alcohol use  Encourage cutting back.    8. Encounter for screening mammogram for malignant neoplasm of breast  - MA Screen Bilateral w/Scott; Future    9. Class 2 severe obesity due to excess calories with serious comorbidity and body mass index (BMI) of 37.0 to 37.9 in adult (H)  - Hemoglobin A1c; Future        Counseling  Appropriate preventive services were addressed with this patient via screening, questionnaire, or discussion as appropriate for fall prevention, nutrition, physical activity, Tobacco-use cessation, social engagement, weight loss and cognition.  Checklist reviewing preventive services available has been given to the patient.  Reviewed patient's diet, addressing concerns and/or questions.   She is at risk for lack of exercise and has been provided with information to increase physical activity for the benefit of her well-being.   The patient was instructed to see the dentist every 6 months.   The patient reports drinking more than 3 alcoholic drinks per day and/or more than 7 drhnks per week. The patient was counseled and given information about possible harmful effects of excessive  alcohol intake.Patient reported safety concerns were addressed today.     Follow-up Visit   Expected date:  Mar 12, 2026 (Approximate)      Follow Up Appointment Details:     Follow-up with whom?: PCP    Follow-Up for what?: Medicare Wellness    Welcome or Annual?: Annual Wellness    How?: In Person               Andrew Mccollum MD  [facility]    Disclaimer: This note consists of symbols derived from keyboarding, dictation and/or voice recognition software. As a result, there may be errors in the script that have gone undetected. Please consider this when interpreting information found in this chart.    The longitudinal plan of care for the diagnosis(es)/condition(s) as documented were addressed during this visit. Due to the added complexity in care, I will continue to support Kesha in the subsequent management and with ongoing continuity of care.    Daphney Rebolledo is a 74 year old, presenting for the following:  Physical        3/5/2025     9:41 AM   Additional Questions   Roomed by yk   Accompanied by self     HPI  Saw Sleep medicine 1/14/25. Continues CPAP.     Saw Cardiology 11/14/24. 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 On metoprolol. HFrEF w/ recovery. Normal EF on 9/24 per cardiology note.    Advance Care Planning  Patient does not have a Health Care Directive: Discussed advance care planning with patient; information given to patient to review.      3/4/2025   General Health   How would you rate your overall physical health? Good   Feel stress (tense, anxious, or unable to sleep) Only a little   (!) STRESS CONCERN      3/4/2025   Nutrition   Diet: Low salt         3/4/2025   Exercise   Days per week of moderate/strenous exercise 3 days   Average minutes spent exercising at this level 50 min         3/4/2025   Social Factors   Frequency of gathering with friends or relatives Once a week    Worry food won't last until get money to buy more No   Food not last or not have enough money for food? No   Do you have housing? (Housing is defined as stable permanent housing and does not include staying ouside in a car, in a tent, in an abandoned building, in an overnight shelter, or couch-surfing.) Yes   Are you worried about losing your housing? No   Lack of transportation? No   Unable to get utilities (heat,electricity)? No         3/4/2025   Fall Risk   Fallen 2 or more times in the past year? No    No   Trouble with walking or balance? No    No       Multiple values from one day are sorted in reverse-chronological order          3/4/2025   Activities of Daily Living- Home Safety   Needs help with the following daily activites None of the above   Safety concerns in the home No grab bars in the bathroom         3/4/2025   Dental   Dentist two times every year? (!) NO         3/4/2025   Hearing Screening   Hearing concerns? None of the above         3/4/2025   Driving Risk Screening   Patient/family members have concerns about driving No         3/4/2025   General Alertness/Fatigue Screening   Have you been more tired than usual lately? No         3/4/2025   Urinary Incontinence Screening   Bothered by leaking urine in past 6 months No         2/22/2024   TB Screening   Were you born outside of the [place]? No         Today's PHQ-2 Score:       3/4/2025     3:00 PM   PHQ-2 ( 1999 [employer])   Q1: Little interest or pleasure in doing things 0   Q2: Feeling down, depressed or hopeless 0   PHQ-2 Score 0    Q1: Little interest or pleasure in doing things Not at all   Q2: Feeling down, depressed or hopeless Not at all   PHQ-2 Score 0       Patient-reported           3/4/2025   Substance Use   Alcohol more than 3/day or more than 7/wk Yes   How often do you have a drink containing alcohol 4 or more times a week   How many alcohol drinks on typical day 3 or 4   How often do you have 5+ drinks at one occasion Less than  monthly   Audit 2/3 Score 2   How often not able to stop drinking once started Never   How often failed to do what normally expected Never   How often needed first drink in am after a heavy drinking session Never   How often feeling of guilt or remorse after drinking Never   How often unable to remember what happened the night before Never   Have you or someone else been injured because of your drinking No   Has anyone been concerned or suggested you cut down on drinking No   TOTAL SCORE - AUDIT 6   Do you have a current opioid prescription? No   How severe/bad is pain from 1 to 10? 0/10 (No Pain)   Do you use any other substances recreationally? (!) CANNABIS PRODUCTS     Social History     Tobacco Use    Smoking status: Never     Passive exposure: Never    Smokeless tobacco: Never   Vaping Use    Vaping status: Never Used   Substance Use Topics    Alcohol use: Yes     Comment: 3.5% beer and [employer] [place] NA    Drug use: No           5/8/2024   LAST FHS-7 RESULTS   1st degree relative breast or ovarian cancer No   Any relative bilateral breast cancer No   Any male have breast cancer No   Any ONE woman have BOTH breast AND ovarian cancer No   Any woman with breast cancer before 50yrs No   2 or more relatives with breast AND/OR ovarian cancer No   2 or more relatives with breast AND/OR bowel cancer No     Mammogram Screening - Mammogram every 1-2 years updated in Health Maintenance based on mutual decision making    ASCVD Risk   The 10-year ASCVD risk score (Erick HANDY, et al., 2019) is: 10.4%    Values used to calculate the score:      Age: 74 years      Sex: Female      Is Non- : No      Diabetic: No      Tobacco smoker: No      Systolic Blood Pressure: 116 mmHg      Is BP treated: No      HDL Cholesterol: 65 mg/dL      Total Cholesterol: 168 mg/dL    Reviewed and updated as needed this visit by Provider   Tobacco  Allergies  Meds  Problems  Med Hx  Surg Hx  Fam Hx        Social  Hx Reviewed    Past Medical History:   Diagnosis Date    Atrial fibrillation (H)     Chronic diastolic congestive heart failure (H)     Fibroid, uterine early 2000s    not symptomatic    Ganglion cyst     left palm    History of blood transfusion 6/4/85    Excessive bleeding during childbirth    Hypothyroidism due to acquired atrophy of thyroid 1/23/2018    Mild concentric left ventricular hypertrophy (LVH) 1/24/2018    Postmenopausal 6/23/2011    Primary osteoarthritis of left hip 11/20/2023     Past Surgical History:   Procedure Laterality Date    CARDIAC SURGERY      COLONOSCOPY  03/11/2010    moderate internal hemorrhoids- 10 yr f/u    COLONOSCOPY N/A 07/16/2021    Procedure: Colonoscopy, With Polypectomy And Biopsy;  Surgeon: Kirsten Molina MD;  Location: [facility] OR    COLONOSCOPY WITH CO2 INSUFFLATION N/A 07/16/2021    Procedure: COLONOSCOPY, WITH CO2 INSUFFLATION;  Surgeon: Kirtsen Molina MD;  Location: [facility] OR    EYE SURGERY  10/17/2017    Cateract removal    HC CORRECT BUNION,DOUBLE OSTEOTOMY  12/08/1998    Left foot bunion removal    HC HYSTEROSCOPY W ENDOMETRIAL BX/POLYPECTOMY W/WO D&C  12/21/2004    HC LAPAROSCOPY, SURGICAL; CHOLECYSTECTOMY  02/14/2005    ZZC REPAIR CRUCIATE LIGAMENT,KNEE  1996    Left knee     Current providers sharing in care for this patient include:  Patient Care Team:  Andrew Mccollum MD as PCP - General (Family Medicine)  Andrew Mccollum MD as Assigned PCP  Curly Cardenas MD as Assigned Musculoskeletal Provider    The following health maintenance items are reviewed in Epic and correct as of today:  Health Maintenance   Topic Date Due    HF ACTION PLAN  Never done    RSV VACCINE (1 - Risk 60-74 years 1-dose series) Never done    LIPID  11/08/2024    BMP  01/19/2025    ALT  02/28/2025    MICROALBUMIN  02/28/2025    ANNUAL REVIEW OF HM ORDERS  02/28/2025    COVID-19 Vaccine (8 - 2024-25 season) 03/20/2025    CBC  07/19/2025    MEDICARE ANNUAL WELLNESS VISIT   "03/05/2026    FALL RISK ASSESSMENT  03/05/2026    MAMMO SCREENING  05/08/2026    GLUCOSE  07/19/2027    COLORECTAL CANCER SCREENING  07/16/2028    ADVANCE CARE PLANNING  03/05/2030    DTAP/TDAP/TD IMMUNIZATION (3 - Td or Tdap) 11/03/2030    DEXA  10/11/2031    TSH W/FREE T4 REFLEX  Completed    HEPATITIS C SCREENING  Completed    PHQ-2 (once per calendar year)  Completed    INFLUENZA VACCINE  Completed    Pneumococcal Vaccine: 50+ Years  Completed    URINALYSIS  Completed    ZOSTER IMMUNIZATION  Completed    HPV IMMUNIZATION  Aged Out    MENINGITIS IMMUNIZATION  Aged Out         Review of Systems  Constitutional, HEENT, cardiovascular, pulmonary, GI, , musculoskeletal, neuro, skin, endocrine and psych systems are negative, except as otherwise noted.     Objective    Exam  /78   Pulse 81   Temp 97.9  F (36.6  C) (Temporal)   Resp 21   Ht 1.589 m (5' 2.56\")   Wt 94.6 kg (208 lb 8 oz)   LMP 10/31/2004 (Exact Date)   SpO2 97%   BMI 37.46 kg/m     Estimated body mass index is 37.46 kg/m  as calculated from the following:    Height as of this encounter: 1.589 m (5' 2.56\").    Weight as of this encounter: 94.6 kg (208 lb 8 oz).    Physical Exam  Constitutional:       Appearance: Normal appearance.   HENT:      Head: Normocephalic and atraumatic.      Right Ear: Tympanic membrane, ear canal and external ear normal. There is no impacted cerumen.      Left Ear: Tympanic membrane, ear canal and external ear normal. There is no impacted cerumen.      Nose: Nose normal. No congestion.      Mouth/Throat:      Mouth: Mucous membranes are moist.      Pharynx: Oropharynx is clear. No oropharyngeal exudate or posterior oropharyngeal erythema.   Eyes:      Extraocular Movements: Extraocular movements intact.      Conjunctiva/sclera: Conjunctivae normal.      Pupils: Pupils are equal, round, and reactive to light.   Cardiovascular:      Rate and Rhythm: Normal rate and regular rhythm.      Heart sounds: Normal heart " sounds. No murmur heard.     No friction rub. No gallop.   Pulmonary:      Effort: No respiratory distress.      Breath sounds: No wheezing or rhonchi.   Abdominal:      General: Abdomen is flat. There is no distension.      Palpations: Abdomen is soft. There is no mass.      Tenderness: There is no abdominal tenderness. There is no guarding.   Musculoskeletal:         General: No swelling.      Cervical back: Normal range of motion and neck supple.   Skin:     Findings: No rash.   Neurological:      General: No focal deficit present.      Mental Status: She is alert and oriented to person, place, and time.      Cranial Nerves: No cranial nerve deficit.      Sensory: No sensory deficit.      Motor: No weakness.      Gait: Gait normal.   Psychiatric:         Mood and Affect: Mood normal.         Behavior: Behavior normal.         Thought Content: Thought content normal.         Judgment: Judgment normal.       Labs: Pending        3/5/2025   Mini Cog   Clock Draw Score 2 Normal   3 Item Recall 2 objects recalled   Mini Cog Total Score 4

## 2025-03-05 NOTE — RESULT ENCOUNTER NOTE
Seth Rebolledo,    If you have not viewed these results on [employer] within 3 days, we will use an alternative method to contact you. We will contact you via the following protocol:    - Via letter if your results are normal.  - Via phone ([phone]) if your results are abnormal.     Here are my comments about your recent results:    Lipids - Your cholesterol lab results were normal.    TSH - Your thyroid lab results were normal.    CMP Results - Your blood sugar was elevated 113, an added on follow-up test is pending. Your kidney function, electrolytes, and liver function were normal.    Your urine protein lab to monitor for kidney disease was normal.    Please call the clinic ([phone]), or message us on Glio with any questions you may have.     Have a great day,    Dr. Jones

## 2025-03-06 ENCOUNTER — PATIENT OUTREACH (OUTPATIENT)
Dept: CARE COORDINATION | Facility: CLINIC | Age: 75
End: 2025-03-06
Payer: MEDICARE

## 2025-03-31 DIAGNOSIS — I48.0 PAROXYSMAL ATRIAL FIBRILLATION (H): ICD-10-CM

## 2025-03-31 RX ORDER — APIXABAN 5 MG/1
5 TABLET, FILM COATED ORAL 2 TIMES DAILY
Qty: 180 TABLET | Refills: 3 | Status: SHIPPED | OUTPATIENT
Start: 2025-03-31

## 2025-05-13 ENCOUNTER — ANCILLARY PROCEDURE (OUTPATIENT)
Dept: MAMMOGRAPHY | Facility: CLINIC | Age: 75
End: 2025-05-13
Attending: FAMILY MEDICINE
Payer: MEDICARE

## 2025-05-13 DIAGNOSIS — Z00.00 ENCOUNTER FOR MEDICARE ANNUAL WELLNESS EXAM: ICD-10-CM

## 2025-05-13 DIAGNOSIS — Z12.31 ENCOUNTER FOR SCREENING MAMMOGRAM FOR MALIGNANT NEOPLASM OF BREAST: ICD-10-CM

## 2025-05-13 PROCEDURE — 77067 SCR MAMMO BI INCL CAD: CPT | Mod: GC | Performed by: RADIOLOGY

## 2025-05-13 PROCEDURE — 77063 BREAST TOMOSYNTHESIS BI: CPT | Mod: GC | Performed by: RADIOLOGY

## 2025-05-25 ENCOUNTER — MYC MEDICAL ADVICE (OUTPATIENT)
Dept: FAMILY MEDICINE | Facility: CLINIC | Age: 75
End: 2025-05-25
Payer: MEDICARE

## 2025-05-25 DIAGNOSIS — I48.0 PAROXYSMAL ATRIAL FIBRILLATION (H): ICD-10-CM

## 2025-05-25 DIAGNOSIS — I50.32 CHRONIC DIASTOLIC CONGESTIVE HEART FAILURE (H): ICD-10-CM

## 2025-05-28 RX ORDER — METOPROLOL SUCCINATE 100 MG/1
100 TABLET, EXTENDED RELEASE ORAL 2 TIMES DAILY
Qty: 180 TABLET | Refills: 3 | Status: SHIPPED | OUTPATIENT
Start: 2025-05-28

## 2025-06-25 ENCOUNTER — OFFICE VISIT (OUTPATIENT)
Dept: PODIATRY | Facility: CLINIC | Age: 75
End: 2025-06-25
Payer: MEDICARE

## 2025-06-25 VITALS — HEIGHT: 63 IN | BODY MASS INDEX: 38.09 KG/M2 | WEIGHT: 215 LBS

## 2025-06-25 DIAGNOSIS — L60.8 PINCER NAIL DEFORMITY: Primary | ICD-10-CM

## 2025-06-25 PROCEDURE — 1125F AMNT PAIN NOTED PAIN PRSNT: CPT | Performed by: PODIATRIST

## 2025-06-25 PROCEDURE — 99203 OFFICE O/P NEW LOW 30 MIN: CPT | Performed by: PODIATRIST

## 2025-06-25 ASSESSMENT — PAIN SCALES - GENERAL: PAINLEVEL_OUTOF10: MILD PAIN (2)

## 2025-06-25 NOTE — LETTER
6/25/2025      Patricia Haynes  [address] [place] [address]      Dear Colleague,    Thank you for referring your patient, Patricia Haynes, to the [facility]. Please see a copy of my visit note below.    HPI:  Patricia Haynes is a 74 year old female who is seen in consultation at the request of self.    Pt presents for eval of:   (Onset, Location, L/R, Character, Treatments, Injury if yes)     Feb 2025, distal Left great toenail avulsed while wearing boots. Continues to have lateral left great toenail pain.   Constant redness, swelling. Intermittent drainage, sharp and throbbing pain.    Noticing Left and Right toenails are becoming thicker.    Retired.      ROS:  10 point ROS neg other than the symptoms noted above in the HPI.    Patient Active Problem List   Diagnosis     Postmenopausal- LMP 2001     Fibroid, uterine     Ganglion cyst     Urinary urgency     Atrophic vaginitis     Urinary frequency     Urge incontinence     Hypothyroidism due to acquired atrophy of thyroid     Mild concentric left ventricular hypertrophy (LVH)     Paroxysmal atrial fibrillation (H)     Chronic diastolic congestive heart failure (H)     Prediabetes     Primary osteoarthritis of left hip     S/P total left hip arthroplasty       PAST MEDICAL HISTORY:   Past Medical History:   Diagnosis Date     Atrial fibrillation (H)      Chronic diastolic congestive heart failure (H)      Fibroid, uterine early 2000s    not symptomatic     Ganglion cyst     left palm     History of blood transfusion 6/4/85    Excessive bleeding during childbirth     Hypothyroidism due to acquired atrophy of thyroid 1/23/2018     Mild concentric left ventricular hypertrophy (LVH) 1/24/2018     Postmenopausal 6/23/2011     Primary osteoarthritis of left hip 11/20/2023        PAST SURGICAL HISTORY:   Past Surgical History:   Procedure Laterality Date     CARDIAC SURGERY  01/2023    pace maker     COLONOSCOPY  03/11/2010    moderate  internal hemorrhoids- 10 yr f/u     COLONOSCOPY N/A 07/16/2021    Procedure: Colonoscopy, With Polypectomy And Biopsy;  Surgeon: Kirsten Molina MD;  Location: [facility]     COLONOSCOPY WITH CO2 INSUFFLATION N/A 07/16/2021    Procedure: COLONOSCOPY, WITH CO2 INSUFFLATION;  Surgeon: Kirsten Molina MD;  Location: [facility] OR     EYE SURGERY  10/17/2017    Cateract removal     HC CORRECT BUNION,DOUBLE OSTEOTOMY  12/08/1998    Left foot bunion removal     HC HYSTEROSCOPY W ENDOMETRIAL BX/POLYPECTOMY W/WO D&C  12/21/2004     HC LAPAROSCOPY, SURGICAL; CHOLECYSTECTOMY  02/14/2005     TOTAL HIP ARTHROPLASTY Left 02/16/2024     ZZC REPAIR CRUCIATE LIGAMENT,KNEE  1996    Left knee     ZZHC CATHETER ABLATION SVT, FLUTTER  07/28/2024        MEDICATIONS:   Current Outpatient Medications:      atorvastatin (LIPITOR) 10 MG tablet, Take 1 tablet (10 mg) by mouth daily., Disp: 90 tablet, Rfl: 3     ELIQUIS ANTICOAGULANT 5 MG tablet, TAKE ONE TABLET BY MOUTH TWICE A DAY, Disp: 180 tablet, Rfl: 3     levothyroxine (SYNTHROID/LEVOTHROID) 75 MCG tablet, Take 1 tablet (75 mcg) by mouth daily., Disp: 90 tablet, Rfl: 2     metoprolol succinate ER (TOPROL XL) 100 MG 24 hr tablet, Take 1 tablet (100 mg) by mouth 2 times daily., Disp: 180 tablet, Rfl: 3     oxyBUTYnin (DITROPAN) 5 MG tablet, Take 1 tablet (5 mg) by mouth daily., Disp: 90 tablet, Rfl: 1     ALLERGIES:    Allergies   Allergen Reactions     No Known Drug Allergy         SOCIAL HISTORY:   Social History     Socioeconomic History     Marital status:      Spouse name: Not on file     Number of children: Not on file     Years of education: Not on file     Highest education level: Not on file   Occupational History     Not on file   Tobacco Use     Smoking status: Never     Passive exposure: Never     Smokeless tobacco: Never   Vaping Use     Vaping status: Never Used   Substance and Sexual Activity     Alcohol use: Yes     Alcohol/week: 21.0 standard drinks of alcohol     Types:  21 Standard drinks or equivalent per week     Comment: 3.5% beer and [employer] NA     Drug use: No     Sexual activity: Not Currently     Partners: Male     Birth control/protection: Post-menopausal, Male Surgical, None     Comment: spouse vasectomy approx 1987   Other Topics Concern     Parent/sibling w/ CABG, MI or angioplasty before 65F 55M? No   Social History Narrative     Not on file     Social Drivers of Health     Financial Resource Strain: Low Risk  (3/4/2025)    Financial Resource Strain      Within the past 12 months, have you or your family members you live with been unable to get utilities (heat, electricity) when it was really needed?: No   Food Insecurity: Low Risk  (3/4/2025)    Food Insecurity      Within the past 12 months, did you worry that your food would run out before you got money to buy more?: No      Within the past 12 months, did the food you bought just not last and you didn t have money to get more?: No   Transportation Needs: Low Risk  (3/4/2025)    Transportation Needs      Within the past 12 months, has lack of transportation kept you from medical appointments, getting your medicines, non-medical meetings or appointments, work, or from getting things that you need?: No   Physical Activity: Sufficiently Active (3/4/2025)    Exercise Vital Sign      Days of Exercise per Week: 3 days      Minutes of Exercise per Session: 50 min   Stress: No Stress Concern Present (3/4/2025)    [place] [employer] - Occupational Stress Questionnaire      Feeling of Stress : Only a little   Social Connections: Unknown (3/4/2025)    Social Connection and Isolation Panel [NHANES]      Frequency of Communication with Friends and Family: Not on file      Frequency of Social Gatherings with Friends and Family: Once a week      Attends Sikh Services: Not on file      Active Member of Clubs or Organizations: Not on file      Attends Club or Organization Meetings: Not on file       Marital Status: Not on file   Interpersonal Safety: Low Risk  (3/5/2025)    Interpersonal Safety      Do you feel physically and emotionally safe where you currently live?: Yes      Within the past 12 months, have you been hit, slapped, kicked or otherwise physically hurt by someone?: No      Within the past 12 months, have you been humiliated or emotionally abused in other ways by your partner or ex-partner?: No   Housing Stability: Low Risk  (3/4/2025)    Housing Stability      Do you have housing? : Yes      Are you worried about losing your housing?: No        FAMILY HISTORY:   Family History   Problem Relation Age of Onset     Cancer Mother         cervical     Genitourinary Problems Mother         kidney disease stage 3     Arthritis Mother      Circulatory Mother         blood thinner; water pill     Lipids Mother      Respiratory Mother         tuberculosis; COPD     Hypertension Mother         Mother     Osteoporosis Mother      Other Cancer Mother         cervical     Hyperlipidemia Mother      Unknown/Adopted Father      Diabetes Maternal Grandmother      Cerebrovascular Disease Maternal Grandmother         Mother     Cardiovascular Maternal Grandfather         heart attack     Heart Disease Maternal Grandfather         heart attack     Unknown/Adopted Paternal Grandmother      Unknown/Adopted Paternal Grandfather      Thyroid Disease Daughter      Diabetes Daughter         Daughter     Depression Daughter      Anxiety Disorder Daughter      Obesity Daughter      Breast Cancer Cousin      C.A.D. No family hx of      Alzheimer Disease No family hx of      Blood Disease No family hx of      Eye Disorder No family hx of      Gastrointestinal Disease No family hx of      Musculoskeletal Disorder No family hx of      Neurologic Disorder No family hx of      Cancer - colorectal No family hx of      Prostate Cancer No family hx of      Asthma No family hx of      Ovarian Cancer No family hx of       "Depression/Anxiety No family hx of      Anesthesia Reaction No family hx of      Thyroid Disease No family hx of      Chemical Addiction No family hx of      Known Genetic Syndrome No family hx of         EXAM:Vitals: Ht 1.588 m (5' 2.5\")   Wt 97.5 kg (215 lb)   LMP 10/31/2004 (Exact Date)   BMI 38.70 kg/m    BMI= Body mass index is 38.7 kg/m .    General appearance: Patient is alert and fully cooperative with history & exam.  No sign of distress is noted during the visit.     Psychiatric: Affect is pleasant & appropriate.  Patient appears motivated to improve health.     Respiratory: Breathing is regular & unlabored while sitting.     HEENT: Hearing is intact to spoken word.  Speech is clear.  No gross evidence of visual impairment that would impact ambulation.     Vascular: DP & PT pulses are intact & regular bilaterally.  No significant edema or varicosities noted.  CFT and skin temperature is normal to both lower extremities.     Neurologic: Lower extremity sensation is intact to light touch.  No evidence of weakness or contracture in the lower extremities.  No evidence of neuropathy.    Dermatologic: Skin is intact to both lower extremities with adequate texture, turgor and tone about the integument.  Subtle pincer nail forming both hallux nails.  The left lateral hallux nail is sloughing skin.  All bleeding and drainage has stopped over the last week she notes.    Musculoskeletal: Patient is ambulatory without assistive device or brace.  No gross ankle deformity noted.  No foot or ankle joint effusion is noted.      ASSESSMENT:       ICD-10-CM    1. 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      6/25/2025   I debrided the nail to provide comfort for her.  Discussed permanent matrixectomy of the lateral border if this remains symptomatic.  She will attempt debridement at home first and see how this goes.  It has not been uncomfortable or troublesome for her prior to " this.  If this remains symptomatic or becomes infected or bleeding or draining follow-up for matrixectomy.  All questions were answered.  Written instructions dispensed.      rFeedom Donohue DPM      Again, thank you for allowing me to participate in the care of your patient.        Sincerely,        Freedom Donohue DPM    Electronically signed

## 2025-06-25 NOTE — PROGRESS NOTES
HPI:  Patricia Haynes is a 74 year old female who is seen in consultation at the request of self.    Pt presents for eval of:   (Onset, Location, L/R, Character, Treatments, Injury if yes)     Feb 2025, distal Left great toenail avulsed while wearing boots. Continues to have lateral left great toenail pain.   Constant redness, swelling. Intermittent drainage, sharp and throbbing pain.    Noticing Left and Right toenails are becoming thicker.    Retired.      ROS:  10 point ROS neg other than the symptoms noted above in the HPI.    Patient Active Problem List   Diagnosis    Postmenopausal- LMP 2001    Fibroid, uterine    Ganglion cyst    Urinary urgency    Atrophic vaginitis    Urinary frequency    Urge incontinence    Hypothyroidism due to acquired atrophy of thyroid    Mild concentric left ventricular hypertrophy (LVH)    Paroxysmal atrial fibrillation (H)    Chronic diastolic congestive heart failure (H)    Prediabetes    Primary osteoarthritis of left hip    S/P total left hip arthroplasty       PAST MEDICAL HISTORY:   Past Medical History:   Diagnosis Date    Atrial fibrillation (H)     Chronic diastolic congestive heart failure (H)     Fibroid, uterine early 2000s    not symptomatic    Ganglion cyst     left palm    History of blood transfusion 6/4/85    Excessive bleeding during childbirth    Hypothyroidism due to acquired atrophy of thyroid 1/23/2018    Mild concentric left ventricular hypertrophy (LVH) 1/24/2018    Postmenopausal 6/23/2011    Primary osteoarthritis of left hip 11/20/2023        PAST SURGICAL HISTORY:   Past Surgical History:   Procedure Laterality Date    CARDIAC SURGERY  01/2023    pace maker    COLONOSCOPY  03/11/2010    moderate internal hemorrhoids- 10 yr f/u    COLONOSCOPY N/A 07/16/2021    Procedure: Colonoscopy, With Polypectomy And Biopsy;  Surgeon: Kirsten Molina MD;  Location: [facility]    COLONOSCOPY WITH CO2 INSUFFLATION N/A 07/16/2021    Procedure: COLONOSCOPY, WITH CO2  INSUFFLATION;  Surgeon: Kirsten Molina MD;  Location: [facility] OR    EYE SURGERY  10/17/2017    Cateract removal    HC CORRECT BUNION,DOUBLE OSTEOTOMY  12/08/1998    Left foot bunion removal    HC HYSTEROSCOPY W ENDOMETRIAL BX/POLYPECTOMY W/WO D&C  12/21/2004    HC LAPAROSCOPY, SURGICAL; CHOLECYSTECTOMY  02/14/2005    TOTAL HIP ARTHROPLASTY Left 02/16/2024    ZZ REPAIR CRUCIATE LIGAMENT,KNEE  1996    Left knee    ZZ CATHETER ABLATION SVT, FLUTTER  07/28/2024        MEDICATIONS:   Current Outpatient Medications:     atorvastatin (LIPITOR) 10 MG tablet, Take 1 tablet (10 mg) by mouth daily., Disp: 90 tablet, Rfl: 3    ELIQUIS ANTICOAGULANT 5 MG tablet, TAKE ONE TABLET BY MOUTH TWICE A DAY, Disp: 180 tablet, Rfl: 3    levothyroxine (SYNTHROID/LEVOTHROID) 75 MCG tablet, Take 1 tablet (75 mcg) by mouth daily., Disp: 90 tablet, Rfl: 2    metoprolol succinate ER (TOPROL XL) 100 MG 24 hr tablet, Take 1 tablet (100 mg) by mouth 2 times daily., Disp: 180 tablet, Rfl: 3    oxyBUTYnin (DITROPAN) 5 MG tablet, Take 1 tablet (5 mg) by mouth daily., Disp: 90 tablet, Rfl: 1     ALLERGIES:    Allergies   Allergen Reactions    No Known Drug Allergy         SOCIAL HISTORY:   Social History     Socioeconomic History    Marital status:      Spouse name: Not on file    Number of children: Not on file    Years of education: Not on file    Highest education level: Not on file   Occupational History    Not on file   Tobacco Use    Smoking status: Never     Passive exposure: Never    Smokeless tobacco: Never   Vaping Use    Vaping status: Never Used   Substance and Sexual Activity    Alcohol use: Yes     Alcohol/week: 21.0 standard drinks of alcohol     Types: 21 Standard drinks or equivalent per week     Comment: 3.5% beer and [employer] NA    Drug use: No    Sexual activity: Not Currently     Partners: Male     Birth control/protection: Post-menopausal, Male Surgical, None     Comment: spouse vasectomy approx 1987   Other Topics  Concern    Parent/sibling w/ CABG, MI or angioplasty before 65F 55M? No   Social History Narrative    Not on file     Social Drivers of Health     Financial Resource Strain: Low Risk  (3/4/2025)    Financial Resource Strain     Within the past 12 months, have you or your family members you live with been unable to get utilities (heat, electricity) when it was really needed?: No   Food Insecurity: Low Risk  (3/4/2025)    Food Insecurity     Within the past 12 months, did you worry that your food would run out before you got money to buy more?: No     Within the past 12 months, did the food you bought just not last and you didn t have money to get more?: No   Transportation Needs: Low Risk  (3/4/2025)    Transportation Needs     Within the past 12 months, has lack of transportation kept you from medical appointments, getting your medicines, non-medical meetings or appointments, work, or from getting things that you need?: No   Physical Activity: Sufficiently Active (3/4/2025)    Exercise Vital Sign     Days of Exercise per Week: 3 days     Minutes of Exercise per Session: 50 min   Stress: No Stress Concern Present (3/4/2025)    [place] citizen [place] of Occupational Health - Occupational Stress Questionnaire     Feeling of Stress : Only a little   Social Connections: Unknown (3/4/2025)    Social Connection and Isolation Panel [NHANES]     Frequency of Communication with Friends and Family: Not on file     Frequency of Social Gatherings with Friends and Family: Once a week     Attends Episcopal Services: Not on file     Active Member of Clubs or Organizations: Not on file     Attends Club or Organization Meetings: Not on file     Marital Status: Not on file   Interpersonal Safety: Low Risk  (3/5/2025)    Interpersonal Safety     Do you feel physically and emotionally safe where you currently live?: Yes     Within the past 12 months, have you been hit, slapped, kicked or otherwise physically hurt by someone?: No     Within  "the past 12 months, have you been humiliated or emotionally abused in other ways by your partner or ex-partner?: No   Housing Stability: Low Risk  (3/4/2025)    Housing Stability     Do you have housing? : Yes     Are you worried about losing your housing?: No        FAMILY HISTORY:   Family History   Problem Relation Age of Onset    Cancer Mother         cervical    Genitourinary Problems Mother         kidney disease stage 3    Arthritis Mother     Circulatory Mother         blood thinner; water pill    Lipids Mother     Respiratory Mother         tuberculosis; COPD    Hypertension Mother         Mother    Osteoporosis Mother     Other Cancer Mother         cervical    Hyperlipidemia Mother     Unknown/Adopted Father     Diabetes Maternal Grandmother     Cerebrovascular Disease Maternal Grandmother         Mother    Cardiovascular Maternal Grandfather         heart attack    Heart Disease Maternal Grandfather         heart attack    Unknown/Adopted Paternal Grandmother     Unknown/Adopted Paternal Grandfather     Thyroid Disease Daughter     Diabetes Daughter         Daughter    Depression Daughter     Anxiety Disorder Daughter     Obesity Daughter     Breast Cancer Cousin     C.A.D. No family hx of     Alzheimer Disease No family hx of     Blood Disease No family hx of     Eye Disorder No family hx of     Gastrointestinal Disease No family hx of     Musculoskeletal Disorder No family hx of     Neurologic Disorder No family hx of     Cancer - colorectal No family hx of     Prostate Cancer No family hx of     Asthma No family hx of     Ovarian Cancer No family hx of     Depression/Anxiety No family hx of     Anesthesia Reaction No family hx of     Thyroid Disease No family hx of     Chemical Addiction No family hx of     Known Genetic Syndrome No family hx of         EXAM:Vitals: Ht 1.588 m (5' 2.5\")   Wt 97.5 kg (215 lb)   LMP 10/31/2004 (Exact Date)   BMI 38.70 kg/m    BMI= Body mass index is 38.7 " kg/m .    General appearance: Patient is alert and fully cooperative with history & exam.  No sign of distress is noted during the visit.     Psychiatric: Affect is pleasant & appropriate.  Patient appears motivated to improve health.     Respiratory: Breathing is regular & unlabored while sitting.     HEENT: Hearing is intact to spoken word.  Speech is clear.  No gross evidence of visual impairment that would impact ambulation.     Vascular: DP & PT pulses are intact & regular bilaterally.  No significant edema or varicosities noted.  CFT and skin temperature is normal to both lower extremities.     Neurologic: Lower extremity sensation is intact to light touch.  No evidence of weakness or contracture in the lower extremities.  No evidence of neuropathy.    Dermatologic: Skin is intact to both lower extremities with adequate texture, turgor and tone about the integument.  Subtle pincer nail forming both hallux nails.  The left lateral hallux nail is sloughing skin.  All bleeding and drainage has stopped over the last week she notes.    Musculoskeletal: Patient is ambulatory without assistive device or brace.  No gross ankle deformity noted.  No foot or ankle joint effusion is noted.      ASSESSMENT:       ICD-10-CM    1. 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      6/25/2025   I debrided the nail to provide comfort for her.  Discussed permanent matrixectomy of the lateral border if this remains symptomatic.  She will attempt debridement at home first and see how this goes.  It has not been uncomfortable or troublesome for her prior to this.  If this remains symptomatic or becomes infected or bleeding or draining follow-up for matrixectomy.  All questions were answered.  Written instructions dispensed.      Freedom Donohue DPM

## 2025-08-11 DIAGNOSIS — N39.41 URGE INCONTINENCE: ICD-10-CM

## 2025-08-11 RX ORDER — OXYBUTYNIN CHLORIDE 5 MG/1
5 TABLET ORAL DAILY
Qty: 90 TABLET | Refills: 0 | Status: SHIPPED | OUTPATIENT
Start: 2025-08-11

## (undated) DEVICE — PREP CHLORAPREP 26ML TINTED ORANGE  260815

## (undated) DEVICE — SOL WATER IRRIG 1000ML BOTTLE 07139-09

## (undated) RX ORDER — FENTANYL CITRATE 50 UG/ML
INJECTION, SOLUTION INTRAMUSCULAR; INTRAVENOUS
Status: DISPENSED
Start: 2021-07-16